# Patient Record
Sex: FEMALE | Race: WHITE | NOT HISPANIC OR LATINO | Employment: PART TIME | URBAN - METROPOLITAN AREA
[De-identification: names, ages, dates, MRNs, and addresses within clinical notes are randomized per-mention and may not be internally consistent; named-entity substitution may affect disease eponyms.]

---

## 2019-04-29 ENCOUNTER — OFFICE VISIT (OUTPATIENT)
Dept: GASTROENTEROLOGY | Facility: CLINIC | Age: 69
End: 2019-04-29
Payer: MEDICARE

## 2019-04-29 VITALS
HEART RATE: 84 BPM | SYSTOLIC BLOOD PRESSURE: 122 MMHG | WEIGHT: 189 LBS | HEIGHT: 66 IN | DIASTOLIC BLOOD PRESSURE: 68 MMHG | BODY MASS INDEX: 30.37 KG/M2

## 2019-04-29 DIAGNOSIS — Z12.11 SCREENING FOR COLON CANCER: ICD-10-CM

## 2019-04-29 DIAGNOSIS — Z86.010 HX OF COLONIC POLYPS: Primary | ICD-10-CM

## 2019-04-29 DIAGNOSIS — I21.09 MYOCARDIAL INFARCTION INVOLVING OTHER CORONARY ARTERY OF ANTERIOR WALL, UNSPECIFIED MI TYPE (HCC): Primary | ICD-10-CM

## 2019-04-29 PROBLEM — E78.5 HYPERLIPIDEMIA: Status: ACTIVE | Noted: 2019-04-29

## 2019-04-29 PROBLEM — F31.9 BIPOLAR DISORDER (HCC): Status: ACTIVE | Noted: 2019-04-29

## 2019-04-29 PROBLEM — I10 HYPERTENSION: Status: ACTIVE | Noted: 2019-04-29

## 2019-04-29 PROBLEM — I21.9 MI (MYOCARDIAL INFARCTION) (HCC): Status: ACTIVE | Noted: 2019-04-29

## 2019-04-29 PROBLEM — F32.A DEPRESSION: Status: ACTIVE | Noted: 2019-04-29

## 2019-04-29 PROBLEM — F41.9 ANXIETY: Status: ACTIVE | Noted: 2019-04-29

## 2019-04-29 PROBLEM — F10.21 HISTORY OF ALCOHOLISM (HCC): Status: ACTIVE | Noted: 2019-04-29

## 2019-04-29 PROCEDURE — 99204 OFFICE O/P NEW MOD 45 MIN: CPT | Performed by: NURSE PRACTITIONER

## 2019-04-29 RX ORDER — FLUOXETINE 10 MG/1
30 CAPSULE ORAL DAILY
Refills: 4 | COMMUNITY
Start: 2019-03-31

## 2019-04-29 RX ORDER — ASPIRIN 81 MG/1
81 TABLET ORAL DAILY
COMMUNITY

## 2019-04-29 RX ORDER — LAMOTRIGINE 200 MG/1
200 TABLET ORAL
Refills: 4 | COMMUNITY
Start: 2019-04-16

## 2019-04-29 RX ORDER — BUSPIRONE HYDROCHLORIDE 10 MG/1
TABLET ORAL
Refills: 4 | COMMUNITY
Start: 2019-04-17

## 2019-04-29 RX ORDER — LISINOPRIL 5 MG/1
5 TABLET ORAL DAILY
Refills: 3 | COMMUNITY
Start: 2019-04-05

## 2019-04-29 RX ORDER — ATORVASTATIN CALCIUM 80 MG/1
80 TABLET, FILM COATED ORAL DAILY
COMMUNITY

## 2019-05-14 PROCEDURE — 88305 TISSUE EXAM BY PATHOLOGIST: CPT | Performed by: PATHOLOGY

## 2019-05-15 ENCOUNTER — LAB REQUISITION (OUTPATIENT)
Dept: LAB | Facility: HOSPITAL | Age: 69
End: 2019-05-15
Payer: MEDICARE

## 2019-05-15 DIAGNOSIS — D12.4 BENIGN NEOPLASM OF DESCENDING COLON: ICD-10-CM

## 2019-05-15 DIAGNOSIS — D12.2 BENIGN NEOPLASM OF ASCENDING COLON: ICD-10-CM

## 2019-05-15 DIAGNOSIS — D12.3 BENIGN NEOPLASM OF TRANSVERSE COLON: ICD-10-CM

## 2019-05-15 DIAGNOSIS — K57.30 DIVERTICULOSIS OF LARGE INTESTINE WITHOUT PERFORATION OR ABSCESS WITHOUT BLEEDING: ICD-10-CM

## 2019-05-15 DIAGNOSIS — Z86.010 HISTORY OF COLONIC POLYPS: ICD-10-CM

## 2019-05-15 DIAGNOSIS — K64.1 SECOND DEGREE HEMORRHOIDS: ICD-10-CM

## 2020-06-12 ENCOUNTER — TRANSCRIBE ORDERS (OUTPATIENT)
Dept: SCHEDULING | Age: 70
End: 2020-06-12

## 2020-06-12 DIAGNOSIS — M75.122 COMPLETE ROTATOR CUFF TEAR OR RUPTURE OF LEFT SHOULDER, NOT SPECIFIED AS TRAUMATIC: ICD-10-CM

## 2020-06-12 DIAGNOSIS — S43.422A SPRAIN OF LEFT ROTATOR CUFF CAPSULE, INITIAL ENCOUNTER: Primary | ICD-10-CM

## 2020-06-12 DIAGNOSIS — M25.512 PAIN IN LEFT SHOULDER: ICD-10-CM

## 2020-06-15 ENCOUNTER — HOSPITAL ENCOUNTER (OUTPATIENT)
Dept: RADIOLOGY | Facility: HOSPITAL | Age: 70
Discharge: HOME | End: 2020-06-15
Attending: ORTHOPAEDIC SURGERY
Payer: MEDICARE

## 2020-06-15 DIAGNOSIS — M75.122 COMPLETE ROTATOR CUFF TEAR OR RUPTURE OF LEFT SHOULDER, NOT SPECIFIED AS TRAUMATIC: ICD-10-CM

## 2020-06-15 DIAGNOSIS — M25.512 PAIN IN LEFT SHOULDER: ICD-10-CM

## 2020-06-15 DIAGNOSIS — S43.422A SPRAIN OF LEFT ROTATOR CUFF CAPSULE, INITIAL ENCOUNTER: ICD-10-CM

## 2020-11-09 ENCOUNTER — APPOINTMENT (EMERGENCY)
Dept: RADIOLOGY | Facility: HOSPITAL | Age: 70
End: 2020-11-09
Payer: MEDICARE

## 2020-11-09 ENCOUNTER — HOSPITAL ENCOUNTER (EMERGENCY)
Facility: HOSPITAL | Age: 70
Discharge: HOME | End: 2020-11-09
Attending: EMERGENCY MEDICINE
Payer: MEDICARE

## 2020-11-09 VITALS
HEIGHT: 66 IN | DIASTOLIC BLOOD PRESSURE: 76 MMHG | RESPIRATION RATE: 18 BRPM | BODY MASS INDEX: 32.14 KG/M2 | OXYGEN SATURATION: 98 % | TEMPERATURE: 97.8 F | SYSTOLIC BLOOD PRESSURE: 172 MMHG | WEIGHT: 200 LBS | HEART RATE: 84 BPM

## 2020-11-09 DIAGNOSIS — S09.90XA HEAD INJURY, INITIAL ENCOUNTER: ICD-10-CM

## 2020-11-09 DIAGNOSIS — S02.2XXA CLOSED FRACTURE OF NASAL BONE, INITIAL ENCOUNTER: ICD-10-CM
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
## 2020-11-09 PROCEDURE — 70486 CT MAXILLOFACIAL W/O DYE: CPT | Mod: ME

## 2020-11-09 PROCEDURE — G1004 CDSM NDSC: HCPCS

## 2020-11-09 PROCEDURE — 72125 CT NECK SPINE W/O DYE: CPT | Mod: ME

## 2020-11-09 PROCEDURE — 73060 X-RAY EXAM OF HUMERUS: CPT | Mod: LT

## 2020-11-09 PROCEDURE — 73030 X-RAY EXAM OF SHOULDER: CPT | Mod: LT

## 2020-11-09 PROCEDURE — 99284 EMERGENCY DEPT VISIT MOD MDM: CPT | Mod: 25

## 2020-11-09 RX ORDER — BUSPIRONE HYDROCHLORIDE 5 MG/1
TABLET ORAL
COMMUNITY
Start: 2020-11-02 | End: 2020-12-01

## 2020-11-09 RX ORDER — ASPIRIN 81 MG/1
81 TABLET ORAL DAILY
COMMUNITY

## 2020-11-09 RX ORDER — LISINOPRIL 5 MG/1
2.5 TABLET ORAL
COMMUNITY
Start: 2020-09-28 | End: 2023-09-28

## 2020-11-09 RX ORDER — LAMOTRIGINE 200 MG/1
200 TABLET ORAL
COMMUNITY
Start: 2020-10-29 | End: 2020-12-01 | Stop reason: SDUPTHER

## 2020-11-09 RX ORDER — ATORVASTATIN CALCIUM 80 MG/1
80 TABLET, FILM COATED ORAL DAILY
COMMUNITY
Start: 2020-09-08

## 2020-11-09 SDOH — HEALTH STABILITY: MENTAL HEALTH: HOW OFTEN DO YOU HAVE A DRINK CONTAINING ALCOHOL?: NEVER

## 2020-11-09 ASSESSMENT — ENCOUNTER SYMPTOMS
SORE THROAT: 0
NECK PAIN: 0
WEAKNESS: 0
SPEECH DIFFICULTY: 0
ACTIVITY CHANGE: 0
ABDOMINAL PAIN: 0
COLOR CHANGE: 0
SHORTNESS OF BREATH: 0
BACK PAIN: 0
SINUS PAIN: 0
FEVER: 0
TROUBLE SWALLOWING: 0
SEIZURES: 0
VOMITING: 0
DIARRHEA: 0
FACIAL SWELLING: 1
HEADACHES: 0
NAUSEA: 0
LIGHT-HEADEDNESS: 0
COUGH: 0
DIAPHORESIS: 0

## 2020-11-09 NOTE — ED ATTESTATION NOTE
I have personally seen and examined the patient.  I reviewed and agree with physician assistant / nurse practitioner’s assessment and plan of care, with the following exceptions: None  My examination, assessment, and plan of care of Keri Hilton is as follows:        70-year-old female fell on the stairs striking her nose.  Patient had no LOC.  Patient has an old rotator cuff injury she is now having increased shoulder pain.  On exam patient is awake alert in no acute distress.  Patient's nose is swollen but not deformed.  She has no lacerations to her face.  CT imaging was obtained and shows nasal bone fractures.  CT of the brain shows no traumatic injury.  CT C-spine shows no fracture.  X-rays of her humerus and shoulder show no fractures.  Patient was discharged home     Bere Lovell MD  11/09/20 2015

## 2020-11-09 NOTE — ED PROVIDER NOTES
HPI     Chief Complaint   Patient presents with   • Fall   • Facial Injury       70-year-old female with past medical history of anxiety, bipolar disorder, depression, hypertension presents after mechanical trip and fall and head injury.  Patient states she tripped and fell and hit her face on stair.  Patient states she had a lot of bleeding from her nose so her neighbors got concerned and sent her to the ER.  Patient denies loss of conscious.  Patient denies being on a blood thinner just takes a baby aspirin daily.  Patient has pain to her nose.  Patient also concerned about her teeth but does not think any are loose.  Also has pain to her left shoulder.  Patient states that she has a chronic rotator cuff injury on that arm and had physical therapy and it back to normal range of motion.  Patient states now it hurts to move and she cannot lift it all the way.  Denies lightheadedness or dizziness prior to the fall or afterwards.  Denies visual changes, chest pain, shortness of breath, abdominal pain, nausea, vomiting, diarrhea, numbness or tingling or focal weakness.           Patient History     Past Medical History:   Diagnosis Date   • Anxiety    • Bipolar disorder (CMS/[facility])    • Depression    • Hypertension        Past Surgical History:   Procedure Laterality Date   •  SECTION     • CORONARY ANGIOPLASTY WITH STENT PLACEMENT         History reviewed. No pertinent family history.    Social History     Tobacco Use   • Smoking status: Current Every Day Smoker     Types: Electronic Cigarette   • Smokeless tobacco: Never Used   Substance Use Topics   • Alcohol use: Never     Frequency: Never   • Drug use: Never       Systems Reviewed from Nursing Triage:          Review of Systems     Review of Systems   Constitutional: Negative for activity change, diaphoresis and fever.   HENT: Positive for facial swelling and nosebleeds. Negative for dental problem, sinus pain, sore throat and trouble swallowing.    Eyes:  "Negative for visual disturbance.   Respiratory: Negative for cough and shortness of breath.    Cardiovascular: Negative for chest pain and leg swelling.   Gastrointestinal: Negative for abdominal pain, diarrhea, nausea and vomiting.   Musculoskeletal: Negative for back pain and neck pain.        Left shoulder pain   Skin: Negative for color change and pallor.   Neurological: Negative for seizures, syncope, speech difficulty, weakness, light-headedness and headaches.   All other systems reviewed and are negative.       Physical Exam     ED Triage Vitals [11/09/20 1840]   Temp Heart Rate Resp BP SpO2   36.6 °C (97.8 °F) 84 18 (!) 176/79 97 %      Temp Source Heart Rate Source Patient Position BP Location FiO2 (%) (Set)   Oral -- -- -- --                     Patient Vitals for the past 24 hrs:   BP Temp Temp src Pulse Resp SpO2 Height Weight   11/09/20 1846 -- -- -- -- -- -- -- 90.7 kg (200 lb)   11/09/20 1840 (!) 176/79 36.6 °C (97.8 °F) Oral 84 18 97 % 1.664 m (5' 5.5\") --                                          Physical Exam  Vitals signs and nursing note reviewed.   Constitutional:       Appearance: She is well-developed.   HENT:      Head: Normocephalic.        Mouth/Throat:      Comments: No loose teeth, maxilla stable  No TTP to jaw or sinuses   Eyes:      Conjunctiva/sclera: Conjunctivae normal.   Neck:      Musculoskeletal: Normal range of motion.   Cardiovascular:      Rate and Rhythm: Normal rate and regular rhythm.   Pulmonary:      Effort: Pulmonary effort is normal.      Breath sounds: Normal breath sounds.   Abdominal:      General: There is no distension.      Palpations: Abdomen is soft. There is no mass.      Tenderness: There is no abdominal tenderness.   Musculoskeletal: Normal range of motion.         General: No tenderness or deformity.      Comments: Left arm: no TTP, pain to lateral shoulder with passive flexion and abduction    Skin:     General: Skin is warm and dry.   Neurological:      " General: No focal deficit present.      Mental Status: She is alert. Mental status is at baseline.   Psychiatric:         Behavior: Behavior normal.              Procedures    Labs Reviewed - No data to display    X-RAY SHOULDER LEFT 2+ VIEWS   ED Interpretation   No obvious fracture or dislocation       Final Result   IMPRESSION: No fracture seen.      COMMENT: Three views of the left shoulder are supplemented by two views of the   left humerus.      We have no prior studies for comparison.      No fracture is identified.      No dislocation is seen about the shoulder or elbow.      The remainder of the humerus appears intact.      There are mild degenerative changes of the acromioclavicular joint.      X-RAY HUMERUS LEFT   ED Interpretation   No obvious fracture or dislocation       Final Result   IMPRESSION: No fracture seen.      COMMENT: Three views of the left shoulder are supplemented by two views of the   left humerus.      We have no prior studies for comparison.      No fracture is identified.      No dislocation is seen about the shoulder or elbow.      The remainder of the humerus appears intact.      There are mild degenerative changes of the acromioclavicular joint.      CT HEAD WITHOUT IV CONTRAST   Final Result   IMPRESSION: No acute intracranial abnormality seen.      COMMENT: 2.5 mm axial scans through the brain were performed without intravenous   contrast material. Images were reconstructed in the sagittal and coronal planes.         CT DOSE:  One or more dose reduction techniques (e.g. automated exposure   control, adjustment of the mA and/or kV according to patient size, use of   iterative reconstruction technique) was utilized for this examination.      Comparison: None.      The ventricles and sulci are prominent in size consistent with atrophy,   consistent with the patient's age.      There are rather extensive areas of low density in the deep white matter, likely   a manifestation of  chronic small vessel ischemic disease in a patient of this   age.      There are no other areas of abnormal high or low density seen throughout the   brain. No intracranial mass, hemorrhage, or midline shift is identified. There   is no evidence to suggest an acute territorial infarction. Please note that MRI   would be more sensitive for an acute ischemic event.      No extra-axial collections are seen.      No osseous abnormality is noted.      There is vascular calcification at the skull base.      CT FACIAL BONES WITHOUT IV CONTRAST   Final Result   IMPRESSION: Bilateral nasal bone fractures.   Possible fracture of the nasal septum.      COMMENT: 2.5 mm helical scans through the facial bones were performed without   intravenous contrast.  Images were reconstructed in the coronal and sagittal   planes..      CT DOSE:  One or more dose reduction techniques (e.g. automated exposure   control, adjustment of the mA and/or kV according to patient size, use of   iterative reconstruction technique) was utilized for this examination.      We have no prior, similar studies for comparison.      There are depressed fractures of the distal nasal bones bilaterally, left   greater than right.  There may also be a somewhat comminuted fracture of the   nasal septum.  There are considerable secretions in the nasal cavity extending   into the nasopharynx which may be hemorrhage from the above-described fractures.      No other fractures are seen.  The pterygoid plates appear intact.      The orbital floors and rims appear intact.      The intraorbital contents appear intact.      There may be minor mucosal thickening of the right maxillary sinus and perhaps a   tiny air-fluid level in the left maxillary sinus.      CT CERVICAL SPINE WITHOUT IV CONTRAST   Final Result   IMPRESSION: No fracture seen.      COMMENT: 1.25 mm helical scans of the cervical spine were performed without   intravenous or intrathecal contrast.  Images were  reconstructed in the coronal   and sagittal planes..      We have no prior studies for comparison.      CT DOSE:  One or more dose reduction techniques (e.g. automated exposure   control, adjustment of the mA and/or kV according to patient size, use of   iterative reconstruction technique) was utilized for this examination..      No fracture or malalignment is seen.      There is mild reversal of the normal lordotic curve which may be due to muscle   spasm or positioning.      There is moderate spondylosis throughout the cervical spine but no severe   central spinal stenosis is seen.      No destructive osseous lesions are seen.      There is a 0.8 cm hypodensity in the right lobe of thyroid gland which, as per   current ACR recommendations, may not require further evaluation.  There is   vascular calcification in the neck.      The lung apices appear clear.      MRI may be of further diagnostic value, as clinically indicated.                  ED Course & MDM     MDM         ED Course as of Nov 09 2018 Mon Nov 09, 2020   1852 Impression: mechanical fall, hit head, nose bleeding  Left shoulder pain    Plan: ct head/neck, xray arm  Discussed pt and plan with attending who agrees    [DB]   1933 Xrays of shoulder unremarkable, probably from previous rotator cuff injury     [DB]   1959 --  IMPRESSION: Bilateral nasal bone fractures.  Possible fracture of the nasal septum.   CT FACIAL BONES WITHOUT IV CONTRAST [DB]   2017 Discussed ice and motrin and follow up with Dr. Casiano.     Pt is new to area and has a new PCP appointment with Dr. Gloria russell     [DB]      ED Course User Index  [DB] Hiral Villarreal PA C         Clinical Impressions as of Nov 09 2018   Fall, initial encounter   Head injury, initial encounter   Closed fracture of nasal bone, initial encounter        Hiral Villarreal PA C  11/09/20 2018

## 2020-11-10 NOTE — DISCHARGE INSTRUCTIONS
Ice your nose as much as possible  600 mg ibuprofen every 6 hours as needed for pain (take with food)  Follow up with Dr. Casiano for further evaluation   Return to the ED at any time for worsening symptoms.

## 2020-12-01 ENCOUNTER — OFFICE VISIT (OUTPATIENT)
Dept: FAMILY MEDICINE | Facility: CLINIC | Age: 70
End: 2020-12-01
Payer: MEDICARE

## 2020-12-01 VITALS
HEIGHT: 65 IN | HEART RATE: 80 BPM | SYSTOLIC BLOOD PRESSURE: 138 MMHG | WEIGHT: 194.2 LBS | BODY MASS INDEX: 32.36 KG/M2 | RESPIRATION RATE: 16 BRPM | OXYGEN SATURATION: 97 % | TEMPERATURE: 98.1 F | DIASTOLIC BLOOD PRESSURE: 80 MMHG

## 2020-12-01 DIAGNOSIS — I10 ESSENTIAL HYPERTENSION: Primary | ICD-10-CM

## 2020-12-01 DIAGNOSIS — E78.5 HYPERLIPIDEMIA, UNSPECIFIED HYPERLIPIDEMIA TYPE: ICD-10-CM

## 2020-12-01 DIAGNOSIS — Z12.31 BREAST CANCER SCREENING BY MAMMOGRAM: ICD-10-CM

## 2020-12-01 DIAGNOSIS — R26.89 BALANCE DISORDER: ICD-10-CM

## 2020-12-01 DIAGNOSIS — F41.9 ANXIETY: ICD-10-CM

## 2020-12-01 DIAGNOSIS — I21.9 MYOCARDIAL INFARCTION, UNSPECIFIED MI TYPE, UNSPECIFIED ARTERY (CMS/HCC): ICD-10-CM

## 2020-12-01 DIAGNOSIS — G62.9 NEUROPATHY: ICD-10-CM

## 2020-12-01 PROBLEM — Z12.11 SCREENING FOR COLON CANCER: Status: ACTIVE | Noted: 2019-04-29

## 2020-12-01 PROBLEM — F10.21 HISTORY OF ALCOHOLISM (CMS/HCC): Status: ACTIVE | Noted: 2019-04-29

## 2020-12-01 PROBLEM — F32.A DEPRESSION: Status: ACTIVE | Noted: 2019-04-29

## 2020-12-01 PROCEDURE — 99204 OFFICE O/P NEW MOD 45 MIN: CPT | Performed by: FAMILY MEDICINE

## 2020-12-01 RX ORDER — BUSPIRONE HYDROCHLORIDE 10 MG/1
20 TABLET ORAL 3 TIMES DAILY
Qty: 180 TABLET | Refills: 1 | Status: SHIPPED | OUTPATIENT
Start: 2020-12-01 | End: 2020-12-28

## 2020-12-01 RX ORDER — FLUOXETINE 10 MG/1
30 CAPSULE ORAL DAILY
COMMUNITY
End: 2020-12-01 | Stop reason: SDUPTHER

## 2020-12-01 RX ORDER — BUSPIRONE HYDROCHLORIDE 10 MG/1
20 TABLET ORAL 3 TIMES DAILY
COMMUNITY
End: 2020-12-01 | Stop reason: SDUPTHER

## 2020-12-01 RX ORDER — GABAPENTIN 300 MG/1
CAPSULE ORAL
COMMUNITY
Start: 2020-11-27 | End: 2020-12-01

## 2020-12-01 RX ORDER — LAMOTRIGINE 200 MG/1
200 TABLET ORAL
Qty: 30 TABLET | Refills: 1 | Status: SHIPPED | OUTPATIENT
Start: 2020-12-01 | End: 2021-02-23

## 2020-12-01 RX ORDER — GABAPENTIN 600 MG/1
600 TABLET ORAL NIGHTLY
COMMUNITY
End: 2021-07-26 | Stop reason: SDUPTHER

## 2020-12-01 RX ORDER — FLUOXETINE 10 MG/1
30 CAPSULE ORAL DAILY
Qty: 90 CAPSULE | Refills: 1 | Status: SHIPPED | OUTPATIENT
Start: 2020-12-01 | End: 2020-12-28

## 2020-12-01 ASSESSMENT — ENCOUNTER SYMPTOMS
APPETITE CHANGE: 0
POLYDIPSIA: 0
ABDOMINAL DISTENTION: 0
NAUSEA: 0
CONSTIPATION: 0
FEVER: 0
COUGH: 0
NUMBNESS: 0
WEAKNESS: 0
SHORTNESS OF BREATH: 0
DIZZINESS: 0
HEADACHES: 0
SINUS PAIN: 0
CHILLS: 0
TROUBLE SWALLOWING: 0
ABDOMINAL PAIN: 0
SINUS PRESSURE: 0
UNEXPECTED WEIGHT CHANGE: 0
EYE REDNESS: 0
DIFFICULTY URINATING: 0
DYSURIA: 0
FLANK PAIN: 0
CHOKING: 0
DIAPHORESIS: 0
JOINT SWELLING: 0
LIGHT-HEADEDNESS: 0
SORE THROAT: 0
BACK PAIN: 0
PALPITATIONS: 0
CHEST TIGHTNESS: 0
RHINORRHEA: 0
VOMITING: 0
FATIGUE: 0
DIARRHEA: 0
FREQUENCY: 0
HEMATURIA: 0
WHEEZING: 0

## 2020-12-01 NOTE — PROGRESS NOTES
Subjective      Patient ID: Keri Hilton is a 70 y.o. female.  1950      HPI    Here today to establish care.  Fell on the stairs recently and broke her nose. Did not follow-up. Pain has improved, no trouble breathing. The bruising and swelling have improved.  Has a balance problem and saw a neurologist. Diagnosed with neuropathy and prescribed gabapentin. Feels unsteady on her feet especially with stairs.   Follows with cardio regularly--just had a stress test. Just had lab work done.  Follows with a psychiatrist--needs to find a new one around here. Stable on her medications.  Had colonoscopy about 2-3 years ago.    The following have been reviewed and updated as appropriate in this visit:  Tobacco  Allergies  Meds  Problems  Surg Hx  Fam Hx  Soc Hx      Review of Systems   Constitutional: Negative for appetite change, chills, diaphoresis, fatigue, fever and unexpected weight change.   HENT: Negative for congestion, ear discharge, ear pain, nosebleeds, postnasal drip, rhinorrhea, sinus pressure, sinus pain, sneezing, sore throat and trouble swallowing.    Eyes: Negative for redness and visual disturbance.   Respiratory: Negative for cough, choking, chest tightness, shortness of breath and wheezing.    Cardiovascular: Negative for chest pain, palpitations and leg swelling.   Gastrointestinal: Negative for abdominal distention, abdominal pain, constipation, diarrhea, nausea and vomiting.   Endocrine: Negative for polydipsia.   Genitourinary: Negative for difficulty urinating, dysuria, flank pain, frequency, genital sores, hematuria and urgency.   Musculoskeletal: Negative for back pain and joint swelling.   Skin: Negative for rash.   Neurological: Negative for dizziness, weakness, light-headedness, numbness and headaches.       Objective     Vitals:    12/01/20 1031   BP: 138/80   BP Location: Right upper arm   Patient Position: Sitting   Pulse: 80   Resp: 16   Temp: 36.7 °C (98.1 °F)   TempSrc:  "Temporal   SpO2: 97%   Weight: 88.1 kg (194 lb 3.2 oz)   Height: 1.645 m (5' 4.75\")     Body mass index is 32.57 kg/m².    Physical Exam  Vitals signs and nursing note reviewed.   Constitutional:       General: She is not in acute distress.     Appearance: Normal appearance. She is well-developed. She is obese. She is not ill-appearing or toxic-appearing.   HENT:      Head: Normocephalic and atraumatic.   Eyes:      General:         Right eye: No discharge.         Left eye: No discharge.      Extraocular Movements: Extraocular movements intact.      Conjunctiva/sclera: Conjunctivae normal.      Pupils: Pupils are equal, round, and reactive to light.   Cardiovascular:      Rate and Rhythm: Normal rate and regular rhythm.      Heart sounds: Normal heart sounds. No murmur. No friction rub.   Pulmonary:      Effort: Pulmonary effort is normal. No respiratory distress.      Breath sounds: Normal breath sounds. No stridor. No wheezing or rhonchi.   Musculoskeletal: Normal range of motion.         General: No swelling.   Neurological:      General: No focal deficit present.      Mental Status: She is alert and oriented to person, place, and time.         Assessment/Plan   Diagnoses and all orders for this visit:    Essential hypertension (Primary)  Assessment & Plan:  Stable, continue medication.      Myocardial infarction, unspecified MI type, unspecified artery (CMS/HCC)  Assessment & Plan:  Follows with cardio.  Continue medication.      Hyperlipidemia, unspecified hyperlipidemia type  Assessment & Plan:  Had lab work done recently for cardio.  Will get copy sent over.  Continue medication.      Anxiety  Assessment & Plan:  Stable, continue medication.  Will schedule with psychiatry.      Balance disorder  Assessment & Plan:  Will schedule for PT.    Orders:  -     Ambulatory referral to Physical Therapy for [facility]; Future    Neuropathy  Assessment & Plan:  Continue gabapentin.      Breast cancer " screening by mammogram  -     BI SCREENING MAMMOGRAM BILATERAL; Future    Other orders  -     busPIRone (BUSPAR) 10 mg tablet; Take 2 tablets (20 mg total) by mouth 3 (three) times a day.  -     FLUoxetine (PROzac) 10 mg capsule; Take 3 capsules (30 mg total) by mouth daily.  -     lamoTRIgine (LaMICtal) 200 mg tablet; Take 1 tablet (200 mg total) by mouth once daily.

## 2020-12-01 NOTE — PATIENT INSTRUCTIONS
ENT: [phone]  Cardiology:   1. Dr. Robles [phone]  2. [facility] Office: [phone]  Psychiatry: Dr. Machuca: [phone]  [facility]  [phone]

## 2020-12-28 RX ORDER — BUSPIRONE HYDROCHLORIDE 10 MG/1
20 TABLET ORAL 3 TIMES DAILY
Qty: 180 TABLET | Refills: 1 | Status: SHIPPED | OUTPATIENT
Start: 2020-12-28 | End: 2021-02-26

## 2020-12-28 RX ORDER — FLUOXETINE 10 MG/1
30 CAPSULE ORAL DAILY
Qty: 90 CAPSULE | Refills: 1 | Status: SHIPPED | OUTPATIENT
Start: 2020-12-28 | End: 2021-02-26

## 2021-02-26 RX ORDER — BUSPIRONE HYDROCHLORIDE 10 MG/1
20 TABLET ORAL 3 TIMES DAILY
Qty: 180 TABLET | Refills: 1 | Status: SHIPPED | OUTPATIENT
Start: 2021-02-26 | End: 2021-05-03

## 2021-02-26 RX ORDER — FLUOXETINE 10 MG/1
30 CAPSULE ORAL DAILY
Qty: 90 CAPSULE | Refills: 1 | Status: SHIPPED | OUTPATIENT
Start: 2021-02-26 | End: 2021-05-03

## 2021-03-18 DIAGNOSIS — G62.9 NEUROPATHY: Primary | ICD-10-CM

## 2021-03-18 RX ORDER — LAMOTRIGINE 200 MG/1
TABLET ORAL
Qty: 30 TABLET | Refills: 0 | Status: SHIPPED | OUTPATIENT
Start: 2021-03-18 | End: 2023-02-27 | Stop reason: DRUGHIGH

## 2021-03-18 NOTE — TELEPHONE ENCOUNTER
Medicine Refill Request    Last Office Visit: 12/1/2020  Last Telemedicine Visit: Visit date not found    Next Office Visit: Visit date not found  Next Telemedicine Visit: Visit date not found         Current Outpatient Medications:   •  aspirin 81 mg enteric coated tablet, Take 81 mg by mouth daily., Disp: , Rfl:   •  atorvastatin (LIPITOR) 80 mg tablet, Take 80 mg by mouth daily. , Disp: , Rfl:   •  busPIRone (BUSPAR) 10 mg tablet, TAKE 2 TABLETS (20 MG TOTAL) BY MOUTH 3 (THREE) TIMES A DAY., Disp: 180 tablet, Rfl: 1  •  FLUoxetine (PROzac) 10 mg capsule, TAKE 3 CAPSULES (30 MG TOTAL) BY MOUTH DAILY., Disp: 90 capsule, Rfl: 1  •  gabapentin (NEURONTIN) 600 mg tablet, Take 600 mg by mouth nightly., Disp: , Rfl:   •  lamoTRIgine (LaMICtal) 200 mg tablet, TAKE 1 TABLET BY MOUTH EVERY DAY, Disp: 30 tablet, Rfl: 0  •  lisinopriL (PRINIVIL) 5 mg tablet, Take 5 mg by mouth once daily., Disp: , Rfl:       BP Readings from Last 3 Encounters:   12/01/20 138/80   11/09/20 (!) 172/76       Recent Lab results:  No results found for: CHOL, No results found for: HDL, No results found for: LDLCALC, No results found for: TRIG     No results found for: GLUCOSE, No results found for: HGBA1C      No results found for: CREATININE    No results found for: TSH

## 2021-05-03 RX ORDER — FLUOXETINE 10 MG/1
30 CAPSULE ORAL DAILY
Qty: 90 CAPSULE | Refills: 1 | Status: SHIPPED | OUTPATIENT
Start: 2021-05-03 | End: 2021-07-26

## 2021-05-03 RX ORDER — BUSPIRONE HYDROCHLORIDE 10 MG/1
20 TABLET ORAL 3 TIMES DAILY
Qty: 180 TABLET | Refills: 1 | Status: SHIPPED | OUTPATIENT
Start: 2021-05-03 | End: 2023-05-22 | Stop reason: SDUPTHER

## 2021-07-14 ENCOUNTER — TELEPHONE (OUTPATIENT)
Dept: FAMILY MEDICINE | Facility: CLINIC | Age: 71
End: 2021-07-14

## 2021-07-14 NOTE — TELEPHONE ENCOUNTER
2 Requests:    1.  Would Dr Castro kindly provide a paper script for Keri so she may go to her local PT office instead of [facility]?    2.  Would Dr Castro please provide her with a script for annual labs so Keri may have blood work drawn before her MAW?

## 2021-07-15 DIAGNOSIS — R26.89 BALANCE DISORDER: ICD-10-CM

## 2021-07-15 DIAGNOSIS — I10 ESSENTIAL HYPERTENSION: ICD-10-CM

## 2021-07-15 DIAGNOSIS — E78.5 HYPERLIPIDEMIA, UNSPECIFIED HYPERLIPIDEMIA TYPE: Primary | ICD-10-CM

## 2021-07-20 ENCOUNTER — HOSPITAL ENCOUNTER (OUTPATIENT)
Dept: RADIOLOGY | Age: 71
Discharge: HOME | End: 2021-07-20
Attending: FAMILY MEDICINE
Payer: MEDICARE

## 2021-07-20 DIAGNOSIS — Z12.31 BREAST CANCER SCREENING BY MAMMOGRAM: ICD-10-CM

## 2021-07-20 PROCEDURE — 77067 SCR MAMMO BI INCL CAD: CPT

## 2021-07-23 LAB
ALBUMIN SERPL-MCNC: 4.3 G/DL (ref 3.6–5.1)
ALBUMIN/GLOB SERPL: 2 (CALC) (ref 1–2.5)
ALP SERPL-CCNC: 97 U/L (ref 37–153)
ALT SERPL-CCNC: 26 U/L (ref 6–29)
AST SERPL-CCNC: 26 U/L (ref 10–35)
BASOPHILS # BLD AUTO: 32 CELLS/UL (ref 0–200)
BASOPHILS NFR BLD AUTO: 0.6 %
BILIRUB SERPL-MCNC: 0.7 MG/DL (ref 0.2–1.2)
BUN SERPL-MCNC: 13 MG/DL (ref 7–25)
BUN/CREAT SERPL: ABNORMAL (CALC) (ref 6–22)
CALCIUM SERPL-MCNC: 9.3 MG/DL (ref 8.6–10.4)
CHLORIDE SERPL-SCNC: 104 MMOL/L (ref 98–110)
CHOLEST SERPL-MCNC: 133 MG/DL
CHOLEST/HDLC SERPL: 2.5 (CALC)
CO2 SERPL-SCNC: 28 MMOL/L (ref 20–32)
CREAT SERPL-MCNC: 0.74 MG/DL (ref 0.5–0.99)
EOSINOPHIL # BLD AUTO: 270 CELLS/UL (ref 15–500)
EOSINOPHIL NFR BLD AUTO: 5 %
ERYTHROCYTE [DISTWIDTH] IN BLOOD BY AUTOMATED COUNT: 12 % (ref 11–15)
GLOBULIN SER CALC-MCNC: 2.2 G/DL (CALC) (ref 1.9–3.7)
GLUCOSE SERPL-MCNC: 132 MG/DL (ref 65–99)
HBA1C MFR BLD: 5.4 % OF TOTAL HGB
HCT VFR BLD AUTO: 42.6 % (ref 35–45)
HDLC SERPL-MCNC: 53 MG/DL
HGB BLD-MCNC: 13.5 G/DL (ref 11.7–15.5)
LDLC SERPL CALC-MCNC: 60 MG/DL (CALC)
LYMPHOCYTES # BLD AUTO: 2009 CELLS/UL (ref 850–3900)
LYMPHOCYTES NFR BLD AUTO: 37.2 %
MCH RBC QN AUTO: 30.3 PG (ref 27–33)
MCHC RBC AUTO-ENTMCNC: 31.7 G/DL (ref 32–36)
MCV RBC AUTO: 95.5 FL (ref 80–100)
MONOCYTES # BLD AUTO: 583 CELLS/UL (ref 200–950)
MONOCYTES NFR BLD AUTO: 10.8 %
NEUTROPHILS # BLD AUTO: 2506 CELLS/UL (ref 1500–7800)
NEUTROPHILS NFR BLD AUTO: 46.4 %
NONHDLC SERPL-MCNC: 80 MG/DL (CALC)
PLATELET # BLD AUTO: 189 THOUSAND/UL (ref 140–400)
PMV BLD REES-ECKER: 11.3 FL (ref 7.5–12.5)
POTASSIUM SERPL-SCNC: 4 MMOL/L (ref 3.5–5.3)
PROT SERPL-MCNC: 6.5 G/DL (ref 6.1–8.1)
QUEST COMMENT: NORMAL
QUEST CONTACT:: NORMAL
QUEST EGFR AFRICAN AMERICAN: 96 ML/MIN/1.73M2
QUEST EGFR NON-AFR. AMERICAN: 83 ML/MIN/1.73M2
QUEST REPORT ALWAYS MESSAGE DEMOGRAPHICS IN QUESTION: NORMAL
RBC # BLD AUTO: 4.46 MILLION/UL (ref 3.8–5.1)
REF LAB TEST NAME: NORMAL
REF LAB TEST: NORMAL
SODIUM SERPL-SCNC: 140 MMOL/L (ref 135–146)
TRIGL SERPL-MCNC: 121 MG/DL
WBC # BLD AUTO: 5.4 THOUSAND/UL (ref 3.8–10.8)

## 2021-07-26 ENCOUNTER — OFFICE VISIT (OUTPATIENT)
Dept: FAMILY MEDICINE | Facility: CLINIC | Age: 71
End: 2021-07-26
Payer: MEDICARE

## 2021-07-26 VITALS
DIASTOLIC BLOOD PRESSURE: 80 MMHG | OXYGEN SATURATION: 95 % | HEIGHT: 65 IN | HEART RATE: 90 BPM | TEMPERATURE: 97.1 F | WEIGHT: 192.6 LBS | BODY MASS INDEX: 32.09 KG/M2 | SYSTOLIC BLOOD PRESSURE: 136 MMHG | RESPIRATION RATE: 12 BRPM

## 2021-07-26 DIAGNOSIS — I10 ESSENTIAL HYPERTENSION: ICD-10-CM

## 2021-07-26 DIAGNOSIS — F10.21 HISTORY OF ALCOHOLISM (CMS/HCC): ICD-10-CM

## 2021-07-26 DIAGNOSIS — Z00.00 MEDICARE ANNUAL WELLNESS VISIT, SUBSEQUENT: Primary | ICD-10-CM

## 2021-07-26 DIAGNOSIS — R26.89 BALANCE DISORDER: ICD-10-CM

## 2021-07-26 DIAGNOSIS — Z78.0 POST-MENOPAUSAL: ICD-10-CM

## 2021-07-26 DIAGNOSIS — G62.9 NEUROPATHY: ICD-10-CM

## 2021-07-26 PROCEDURE — G0439 PPPS, SUBSEQ VISIT: HCPCS | Performed by: FAMILY MEDICINE

## 2021-07-26 RX ORDER — FLUOXETINE HYDROCHLORIDE 40 MG/1
40 CAPSULE ORAL DAILY
COMMUNITY
End: 2021-10-12

## 2021-07-26 RX ORDER — MAGNESIUM 250 MG
250 TABLET ORAL DAILY
COMMUNITY
End: 2021-10-12

## 2021-07-26 RX ORDER — GABAPENTIN 600 MG/1
600 TABLET ORAL NIGHTLY
Qty: 30 TABLET | Refills: 3 | Status: SHIPPED | OUTPATIENT
Start: 2021-07-26 | End: 2021-09-28 | Stop reason: SDUPTHER

## 2021-07-26 ASSESSMENT — ENCOUNTER SYMPTOMS
COUGH: 0
PALPITATIONS: 0
NAUSEA: 0
CONSTIPATION: 0
CHILLS: 0
SHORTNESS OF BREATH: 0
FATIGUE: 0
VOMITING: 0
FEVER: 0
ABDOMINAL PAIN: 0
DIARRHEA: 0
WHEEZING: 0

## 2021-07-26 NOTE — PROGRESS NOTES
Subjective      Patient ID: Keri Hilton is a 70 y.o. female.  1950      HPI    Here today for MAW.  Has her previous mammogram imaging on disc.   Was unable to schedule for the PT--needs a new script to go to [facility].  Falls out of bed all the time.   Has been taking melatonin.     List of other providers:  Following [facility] for medications.  Dermatology  Cardiology    Overall Health    Over the past 4 weeks, how would you rate your health in general? Very good    During the past four weeks, how much bodily pain have you generally had?Very Mild pain    During the past four weeks, was someone available to help you if you needed and wanted help?Yes as much as she wanted    How often do you have trouble taking medicines the way you have been told to take them? Always takes them as prescribed    Cognitive screening?  3/5    Depression Screening  1. Over the past two weeks, have you felt down, depressed, or hopeless? No  2.   Over the past two weeks, have you felt little interest or pleasure in doing things? Yes    Functional Ability/Safety Screening  1. Was the patient's timed up and go test longer than 30 seconds? No  a. Have you fallen 2 or more times in the past year? Yes     b. Are you afraid of falling? Yes  2. Can you get places out of walking distance without help? For example can you travel alone by bus, taxi, or drive your own car? Yes  3. Can you shop for groceries or clothes without help? Yes  4. Can you prepare your own meals? Yes  5. Can you do your own housework without help? Yes  6. Can you handle your own money without help?Yes  7. Do you need help eating, bathing, dressing, or getting around your home? No  8. Does the patient's home have rugs in the hallway, lack grab bars in the bathroom, lack handrails on the stairs or have poor lighting? No  9. Has the patient noticed any hearing difficulties? No      Screening Exams  1. Mammogram? Done 7/20/21--recommend comparing to previous  "imaging  2. Colonoscopy? UTD per patient  3. DEXA? Ordered  4. Pneumovax? UTD  5. Shingrix? Has not gotten due to cost.    Advance Care Directive?  Yes      The following have been reviewed and updated as appropriate in this visit:  Allergies  Meds  Problems       Review of Systems   Constitutional: Negative for chills, fatigue and fever.   Respiratory: Negative for cough, shortness of breath and wheezing.    Cardiovascular: Negative for chest pain, palpitations and leg swelling.   Gastrointestinal: Negative for abdominal pain, constipation, diarrhea, nausea and vomiting.       Objective     Vitals:    07/26/21 1134 07/26/21 1157   BP: (!) 144/82 136/80   BP Location: Left upper arm Left upper arm   Patient Position: Sitting    Pulse: 90    Resp: 12    Temp: 36.2 °C (97.1 °F)    TempSrc: Temporal    SpO2: 95%    Weight: 87.4 kg (192 lb 9.6 oz)    Height: 1.645 m (5' 4.75\")      Body mass index is 32.3 kg/m².    Physical Exam  Vitals and nursing note reviewed.   Constitutional:       General: She is not in acute distress.     Appearance: Normal appearance. She is well-developed. She is not ill-appearing.   Cardiovascular:      Rate and Rhythm: Normal rate and regular rhythm.      Heart sounds: Normal heart sounds. No murmur heard.   No friction rub.   Pulmonary:      Effort: Pulmonary effort is normal. No respiratory distress.      Breath sounds: Normal breath sounds. No stridor. No wheezing, rhonchi or rales.   Musculoskeletal:         General: Normal range of motion.   Neurological:      Mental Status: She is alert.         Assessment/Plan   Diagnoses and all orders for this visit:    Medicare annual wellness visit, subsequent (Primary)    Balance disorder  Assessment & Plan:  Will schedule for physical therapy.    Orders:  -     Ambulatory referral to Physical Therapy; Future    Neuropathy  Assessment & Plan:  Continue gabapentin.      Post-menopausal  -     DEXA BONE DENSITY; Future    Essential " hypertension  Assessment & Plan:  Continue lisinopril.      History of alcoholism (CMS/HCC)    Other orders  -     gabapentin (NEURONTIN) 600 mg tablet; Take 1 tablet (600 mg total) by mouth nightly.

## 2021-08-16 ENCOUNTER — TRANSCRIBE ORDERS (OUTPATIENT)
Dept: RADIOLOGY | Age: 71
End: 2021-08-16

## 2021-08-16 DIAGNOSIS — R92.8 OTHER ABNORMAL AND INCONCLUSIVE FINDINGS ON DIAGNOSTIC IMAGING OF BREAST: Primary | ICD-10-CM

## 2021-08-24 ENCOUNTER — TELEPHONE (OUTPATIENT)
Dept: FAMILY MEDICINE | Facility: CLINIC | Age: 71
End: 2021-08-24

## 2021-08-24 ENCOUNTER — OFFICE VISIT (OUTPATIENT)
Dept: FAMILY MEDICINE | Facility: CLINIC | Age: 71
End: 2021-08-24
Payer: MEDICARE

## 2021-08-24 VITALS
SYSTOLIC BLOOD PRESSURE: 168 MMHG | TEMPERATURE: 97.1 F | WEIGHT: 191 LBS | HEIGHT: 64 IN | BODY MASS INDEX: 32.61 KG/M2 | OXYGEN SATURATION: 99 % | DIASTOLIC BLOOD PRESSURE: 100 MMHG | HEART RATE: 69 BPM

## 2021-08-24 DIAGNOSIS — M54.50 LUMBAR BACK PAIN: Primary | ICD-10-CM

## 2021-08-24 DIAGNOSIS — I10 ESSENTIAL HYPERTENSION: ICD-10-CM

## 2021-08-24 PROBLEM — Z00.00 MEDICARE ANNUAL WELLNESS VISIT, SUBSEQUENT: Status: RESOLVED | Noted: 2021-07-26 | Resolved: 2021-08-24

## 2021-08-24 PROBLEM — Z12.11 SCREENING FOR COLON CANCER: Status: RESOLVED | Noted: 2019-04-29 | Resolved: 2021-08-24

## 2021-08-24 PROCEDURE — G8753 SYS BP > OR = 140: HCPCS | Performed by: FAMILY MEDICINE

## 2021-08-24 PROCEDURE — G8755 DIAS BP > OR = 90: HCPCS | Performed by: FAMILY MEDICINE

## 2021-08-24 PROCEDURE — 99214 OFFICE O/P EST MOD 30 MIN: CPT | Performed by: FAMILY MEDICINE

## 2021-08-24 RX ORDER — METHYLPREDNISOLONE 4 MG/1
TABLET ORAL
Qty: 21 TABLET | Refills: 0 | Status: SHIPPED | OUTPATIENT
Start: 2021-08-24 | End: 2021-08-31

## 2021-08-24 ASSESSMENT — ENCOUNTER SYMPTOMS
NUMBNESS: 0
DYSURIA: 0
CHILLS: 0
WEAKNESS: 0
BACK PAIN: 1
DIFFICULTY URINATING: 0
FEVER: 0

## 2021-08-24 NOTE — TELEPHONE ENCOUNTER
Pt states doctor gave pt a note for work at today's [employer].  Pt is asking if note can be faxed to her employer at [phone] atten: HR?

## 2021-08-24 NOTE — LETTER
August 24, 2021     Patient: Keri Hilton  YOB: 1950  Date of Visit: 8/24/2021    To Whom it May Concern:    Keri Hilton was seen in my clinic on 8/24/2021. Please excuse Keri for her absence from work on 8/21/2021 until 9/1/2021.    If you have any questions or concerns, please don't hesitate to call.         Sincerely,           Richard Castro MD        CC: No Recipients

## 2021-08-24 NOTE — ASSESSMENT & PLAN NOTE
Will stop the advil and take the medrol dose biju.  Continue the extra dose of the gabapentin.  Can use some ice/heat as needed throughout the day.  Will keep out of work for the week.  Call if not improving or if symptoms are worsening over the next 1-2 weeks.

## 2021-08-24 NOTE — PROGRESS NOTES
"      Subjective      Patient ID: Keri Hilton is a 71 y.o. female.  1950      HPI    On Tuesday was walking down a ramp carrying stuff with both hands and fell. Fell on both knees and then the hands. Then Friday went to [employer] and walked a lot and was still ok. Then Saturday woke up and moved and had a pain and had to call out. Feeling better Sunday. But then since yesterday had trouble getting out of the shower and has been excruciating pain every since. Pain is in the lower back on the right side and goes down the right leg to the knee. Saturday foot felt numb but that has resolved. Pain is constant--was the least pain when sitting in the car. Has been taking advil and using heating pads, took an extra dose of the gabapentin in the morning. Works at [employer] and is unable to go to work right now due to the pain. Has been out since Saturday.    The following have been reviewed and updated as appropriate in this visit:  Allergies  Meds  Problems       Review of Systems   Constitutional: Negative for chills and fever.   Genitourinary: Negative for difficulty urinating and dysuria.   Musculoskeletal: Positive for back pain.   Neurological: Negative for weakness and numbness.       Objective     Vitals:    08/24/21 0942   BP: (!) 168/100   BP Location: Left upper arm   Patient Position: Sitting   Pulse: 69   Temp: 36.2 °C (97.1 °F)   TempSrc: Temporal   SpO2: 99%   Weight: 86.6 kg (191 lb)   Height: 1.626 m (5' 4\")     Body mass index is 32.79 kg/m².    Physical Exam  Vitals and nursing note reviewed.   Constitutional:       General: She is not in acute distress.     Appearance: Normal appearance. She is obese. She is not ill-appearing.   Cardiovascular:      Rate and Rhythm: Normal rate and regular rhythm.      Heart sounds: Normal heart sounds. No murmur heard.     Pulmonary:      Effort: Pulmonary effort is normal. No respiratory distress.      Breath sounds: Normal breath sounds. No stridor. No " wheezing, rhonchi or rales.   Musculoskeletal:         General: No tenderness.   Neurological:      Mental Status: She is alert.      Sensory: No sensory deficit.      Motor: No weakness.         Assessment/Plan   Diagnoses and all orders for this visit:    Lumbar back pain (Primary)  Assessment & Plan:  Will stop the advil and take the medrol dose biju.  Continue the extra dose of the gabapentin.  Can use some ice/heat as needed throughout the day.  Will keep out of work for the week.  Call if not improving or if symptoms are worsening over the next 1-2 weeks.    Orders:  -     X-RAY LUMBAR SPINE COMPLETE 4+ VIEWS; Future    Essential hypertension  Assessment & Plan:  BP elevated today due to the pain. Will continue medication and monitor.      Other orders  -     methylPREDNISolone (MEDROL DOSEPACK) 4 mg tablet; Follow package directions.

## 2021-08-24 NOTE — PATIENT INSTRUCTIONS
Continue with the gabapentin twice a day.  Take the medrol dose biju as directed--do not take extra ibuprofen, motrin, advil aleve with the medrol dose biju. It is ok to take tylenol with it.   Use ice/heat to the area for 15 minutes at a time throughout the day.

## 2021-09-02 ENCOUNTER — TRANSCRIBE ORDERS (OUTPATIENT)
Dept: SCHEDULING | Age: 71
End: 2021-09-02

## 2021-09-02 DIAGNOSIS — M54.16 RADICULOPATHY, LUMBAR REGION: Primary | ICD-10-CM

## 2021-09-07 ENCOUNTER — HOSPITAL ENCOUNTER (OUTPATIENT)
Dept: RADIOLOGY | Age: 71
Discharge: HOME | End: 2021-09-07
Attending: RADIOLOGY
Payer: MEDICARE

## 2021-09-07 ENCOUNTER — HOSPITAL ENCOUNTER (OUTPATIENT)
Dept: RADIOLOGY | Age: 71
Discharge: HOME | End: 2021-09-07
Attending: FAMILY MEDICINE
Payer: MEDICARE

## 2021-09-07 DIAGNOSIS — R92.8 OTHER ABNORMAL AND INCONCLUSIVE FINDINGS ON DIAGNOSTIC IMAGING OF BREAST: ICD-10-CM

## 2021-09-07 DIAGNOSIS — Z78.0 POST-MENOPAUSAL: ICD-10-CM

## 2021-09-07 PROCEDURE — 76642 ULTRASOUND BREAST LIMITED: CPT | Mod: RT

## 2021-09-07 PROCEDURE — 77065 DX MAMMO INCL CAD UNI: CPT | Mod: RT

## 2021-09-07 PROCEDURE — G0279 TOMOSYNTHESIS, MAMMO: HCPCS | Mod: RT

## 2021-09-07 PROCEDURE — 77080 DXA BONE DENSITY AXIAL: CPT

## 2021-09-08 ENCOUNTER — HOSPITAL ENCOUNTER (OUTPATIENT)
Dept: RADIOLOGY | Facility: HOSPITAL | Age: 71
Discharge: HOME | End: 2021-09-08
Attending: PHYSICIAN ASSISTANT
Payer: MEDICARE

## 2021-09-08 DIAGNOSIS — M54.16 RADICULOPATHY, LUMBAR REGION: ICD-10-CM

## 2021-09-28 ENCOUNTER — TELEPHONE (OUTPATIENT)
Dept: FAMILY MEDICINE | Facility: CLINIC | Age: 71
End: 2021-09-28

## 2021-09-28 DIAGNOSIS — G62.9 NEUROPATHY: Primary | ICD-10-CM

## 2021-09-28 RX ORDER — GABAPENTIN 600 MG/1
600 TABLET ORAL NIGHTLY
Qty: 30 TABLET | Refills: 3 | Status: SHIPPED | OUTPATIENT
Start: 2021-09-28 | End: 2021-10-12

## 2021-09-28 NOTE — TELEPHONE ENCOUNTER
Do you have enough medication for the next 5 days?: yes  Did you request this medication through your pharmacy or our patient portal in the last day or two? no  Name of medication requested: gabapentin (NEURONTIN)   Medication Strength: 600 mg tablet  Mediation Directions: Take 1 tablet twice a day   Quantity Requested (example 30/90): 60  Number of refills requested: 3      System Generated Preferred Pharmacy(s)  are as follows.  If more than one pharmacy displays please identify which one should be used for this call    Has the pharmacy information below been confirmed with the patient?  yes       [id number] [place] - [facility]  [facility]  [place] [address]  Phone: [phone] Fax: [phone]              Additional Comments:    Next Encounter with this provider: 10/12/2021

## 2021-10-12 ENCOUNTER — OFFICE VISIT (OUTPATIENT)
Dept: FAMILY MEDICINE | Facility: CLINIC | Age: 71
End: 2021-10-12
Payer: MEDICARE

## 2021-10-12 VITALS
OXYGEN SATURATION: 97 % | TEMPERATURE: 96.6 F | SYSTOLIC BLOOD PRESSURE: 120 MMHG | RESPIRATION RATE: 16 BRPM | WEIGHT: 196.2 LBS | HEART RATE: 86 BPM | HEIGHT: 64 IN | DIASTOLIC BLOOD PRESSURE: 76 MMHG | BODY MASS INDEX: 33.49 KG/M2

## 2021-10-12 DIAGNOSIS — M54.50 LUMBAR BACK PAIN: ICD-10-CM

## 2021-10-12 DIAGNOSIS — G62.9 NEUROPATHY: ICD-10-CM

## 2021-10-12 DIAGNOSIS — R56.9 SEIZURE-LIKE ACTIVITY (CMS/HCC): Primary | ICD-10-CM

## 2021-10-12 DIAGNOSIS — F41.9 ANXIETY: ICD-10-CM

## 2021-10-12 DIAGNOSIS — I10 PRIMARY HYPERTENSION: ICD-10-CM

## 2021-10-12 PROCEDURE — 90694 VACC AIIV4 NO PRSRV 0.5ML IM: CPT | Performed by: FAMILY MEDICINE

## 2021-10-12 PROCEDURE — 99214 OFFICE O/P EST MOD 30 MIN: CPT | Mod: 25 | Performed by: FAMILY MEDICINE

## 2021-10-12 PROCEDURE — G0008 ADMIN INFLUENZA VIRUS VAC: HCPCS | Performed by: FAMILY MEDICINE

## 2021-10-12 PROCEDURE — G8752 SYS BP LESS 140: HCPCS | Performed by: FAMILY MEDICINE

## 2021-10-12 PROCEDURE — G8754 DIAS BP LESS 90: HCPCS | Performed by: FAMILY MEDICINE

## 2021-10-12 RX ORDER — GABAPENTIN 600 MG/1
600 TABLET ORAL
Qty: 180 TABLET | Refills: 1 | Status: SHIPPED | OUTPATIENT
Start: 2021-10-12 | End: 2022-04-06 | Stop reason: SDUPTHER

## 2021-10-12 RX ORDER — GABAPENTIN 600 MG/1
600 TABLET ORAL
COMMUNITY
Start: 2021-10-12 | End: 2021-10-12 | Stop reason: SDUPTHER

## 2021-10-12 ASSESSMENT — ENCOUNTER SYMPTOMS
DIARRHEA: 0
VOMITING: 0
FEVER: 0
COUGH: 0
BACK PAIN: 1
PALPITATIONS: 0
SEIZURES: 0
CONSTIPATION: 0
NAUSEA: 0
HEADACHES: 0
CHILLS: 0
FATIGUE: 0
ABDOMINAL PAIN: 0
SHORTNESS OF BREATH: 0
WHEEZING: 0
TREMORS: 0

## 2021-10-12 NOTE — PROGRESS NOTES
"      Subjective      Patient ID: Keri Hilton is a 71 y.o. female.  1950      HPI    Here today for a follow-up visit.  Back pain: Saw ortho who put on antiinflammatories and it did eventually improve. Is now taking the gabapentin twice a day and feels that it is helping with the neuropathy.  Started having convulsions in the middle of the treatment for the back--had them about 30 times in a day. Went to the hospital and saw neurologist who thought it was serotonin syndrome. Adjusted medications. Has a follow-up next week with neuro scheduled. Has not had symptoms within 2-3 days of stopping the fluoxetine.  Has not seen a psychiatrist in a while. Mood is currently stable on the buspirone and lamictal.    The following have been reviewed and updated as appropriate in this visit:  Allergies  Meds  Problems       Review of Systems   Constitutional: Negative for chills, fatigue and fever.   Respiratory: Negative for cough, shortness of breath and wheezing.    Cardiovascular: Negative for chest pain, palpitations and leg swelling.   Gastrointestinal: Negative for abdominal pain, constipation, diarrhea, nausea and vomiting.   Musculoskeletal: Positive for back pain.   Neurological: Negative for tremors, seizures and headaches.       Objective     Vitals:    10/12/21 1334   BP: 120/76   BP Location: Left upper arm   Patient Position: Sitting   Pulse: 86   Resp: 16   Temp: (!) 35.9 °C (96.6 °F)   TempSrc: Temporal   SpO2: 97%   Weight: 89 kg (196 lb 3.2 oz)   Height: 1.626 m (5' 4\")     Body mass index is 33.68 kg/m².    Physical Exam  Vitals and nursing note reviewed.   Constitutional:       General: She is not in acute distress.     Appearance: Normal appearance. She is not ill-appearing.   Cardiovascular:      Rate and Rhythm: Normal rate and regular rhythm.      Heart sounds: Normal heart sounds. No murmur heard.      Pulmonary:      Effort: Pulmonary effort is normal. No respiratory distress.      Breath " sounds: Normal breath sounds. No stridor. No wheezing, rhonchi or rales.   Musculoskeletal:      Right lower leg: No edema.      Left lower leg: No edema.   Neurological:      General: No focal deficit present.      Mental Status: She is alert and oriented to person, place, and time.         Assessment/Plan   Diagnoses and all orders for this visit:    Seizure-like activity (CMS/HCC) (Primary)  Assessment & Plan:  Has not had any further episodes since leaving the hospital and stopping the fluoxetine.  Has a follow-up with the neurologist scheduled next week.      Primary hypertension  Assessment & Plan:  BP good today. Continue medication.      Lumbar back pain  Assessment & Plan:  Following with ortho. Continue with the gabapentin twice a day.      Anxiety  Assessment & Plan:  Continue current medications and will schedule with psychiatry.      Neuropathy  -     gabapentin 600 mg tablet; Take 1 tablet (600 mg total) by mouth 2 (two) times a day.    Other orders  -     Influenza vaccine Quad Adjuvanted 65 and Older (FluAd Quad)

## 2021-10-12 NOTE — ASSESSMENT & PLAN NOTE
Has not had any further episodes since leaving the hospital and stopping the fluoxetine.  Has a follow-up with the neurologist scheduled next week.

## 2021-10-12 NOTE — PATIENT INSTRUCTIONS
[facility]: [phone]  Dr. Machuca: [phone]  [facility]: [phone]

## 2022-03-25 DIAGNOSIS — R92.8 ABNORMALITY OF RIGHT BREAST ON SCREENING MAMMOGRAM: Primary | ICD-10-CM

## 2022-04-06 DIAGNOSIS — G62.9 NEUROPATHY: ICD-10-CM

## 2022-04-07 RX ORDER — GABAPENTIN 600 MG/1
600 TABLET ORAL
Qty: 180 TABLET | Refills: 1 | Status: SHIPPED | OUTPATIENT
Start: 2022-04-07 | End: 2022-08-05 | Stop reason: SDUPTHER

## 2022-04-28 ENCOUNTER — HOSPITAL ENCOUNTER (OUTPATIENT)
Dept: RADIOLOGY | Age: 72
Discharge: HOME | End: 2022-04-28
Attending: FAMILY MEDICINE
Payer: COMMERCIAL

## 2022-04-28 DIAGNOSIS — R92.8 ABNORMALITY OF RIGHT BREAST ON SCREENING MAMMOGRAM: ICD-10-CM

## 2022-04-28 PROCEDURE — G0279 TOMOSYNTHESIS, MAMMO: HCPCS | Mod: RT

## 2022-04-28 PROCEDURE — 76642 ULTRASOUND BREAST LIMITED: CPT | Mod: RT

## 2022-04-29 ENCOUNTER — PATIENT OUTREACH (OUTPATIENT)
Dept: RADIOLOGY | Facility: HOSPITAL | Age: 72
End: 2022-04-29
Payer: COMMERCIAL

## 2022-04-29 ENCOUNTER — TRANSCRIBE ORDERS (OUTPATIENT)
Dept: RADIOLOGY | Facility: HOSPITAL | Age: 72
End: 2022-04-29

## 2022-04-29 DIAGNOSIS — R92.8 OTHER ABNORMAL AND INCONCLUSIVE FINDINGS ON DIAGNOSTIC IMAGING OF BREAST: Primary | ICD-10-CM

## 2022-04-29 NOTE — PROGRESS NOTES
Also spoke with patient about consulting Cardiogist, Dr. Garcia regarding stopping aspirin administration 3 days prior to biopsy.  I informed patient we ask for three days prior to biopsy to stop aspirin if permitted by Dr. Garcia and to follow his advise.

## 2022-05-05 ENCOUNTER — OFFICE VISIT (OUTPATIENT)
Dept: SURGERY | Facility: CLINIC | Age: 72
End: 2022-05-05
Payer: COMMERCIAL

## 2022-05-05 VITALS
BODY MASS INDEX: 29.76 KG/M2 | HEART RATE: 66 BPM | OXYGEN SATURATION: 97 % | TEMPERATURE: 97.9 F | SYSTOLIC BLOOD PRESSURE: 110 MMHG | WEIGHT: 178.6 LBS | HEIGHT: 65 IN | DIASTOLIC BLOOD PRESSURE: 70 MMHG

## 2022-05-05 DIAGNOSIS — N63.10 MASS OF RIGHT BREAST, UNSPECIFIED QUADRANT: Primary | ICD-10-CM

## 2022-05-05 PROCEDURE — 3078F DIAST BP <80 MM HG: CPT | Performed by: NURSE PRACTITIONER

## 2022-05-05 PROCEDURE — 99203 OFFICE O/P NEW LOW 30 MIN: CPT | Performed by: NURSE PRACTITIONER

## 2022-05-05 PROCEDURE — 3074F SYST BP LT 130 MM HG: CPT | Performed by: NURSE PRACTITIONER

## 2022-05-05 PROCEDURE — 3008F BODY MASS INDEX DOCD: CPT | Performed by: NURSE PRACTITIONER

## 2022-05-05 NOTE — PROGRESS NOTES
[facility]  [facility]  [address] [place] [address]  Phone: [phone]  Fax: [phone]      Patient ID: Keri Hilton                              : 1950    Visit Date: 2022  Referring Provider: Richard Castro MD   PCP: Richard Castro MD  GYN: No care team member to display    Chief Complaint: Abnormal Breast Imaging (Right Breast)      Keri Hilton is a 71 y.o.  female who presents for consultation today.  She had recent imaging which revealed a 0.7 x 0.4 x 0.6 lobulated structure within a ectatic duct at the 4 o'clock position of her right breast.  Ultrasound-guided core needle biopsy has been recommended.  The patient denies palpability, nipple discharge, or any other complaints at present.          Breast Health:  Age at menarche: 15  Age at first live birth: 27   Age of menopause: 47     Allergies: Shellfish containing products and Shellfish derived    Current Medications: has a current medication list which includes the following prescription(s): aspirin, atorvastatin, buspirone, gabapentin, lamotrigine, and lisinopril.    Past Medical History:  has a past medical history of Anxiety, Bipolar disorder (CMS/HCC), Depression, and Hypertension.    Past Surgical History:  has a past surgical history that includes Coronary angioplasty with stent;  section; and Ankle surgery (Left).    Social History:   Social History     Tobacco Use   • Smoking status: Former Smoker     Types: Electronic Cigarette     Quit date: 2016     Years since quittin.3   • Smokeless tobacco: Never Used   Vaping Use   • Vaping Use: Every day   • Substances: Nicotine, Flavoring   • Devices: Disposable, Pre-filled or refillable cartridge, Refillable tank, Pre-filled pod   Substance Use Topics   • Alcohol use: Never   • Drug use: Never       Family History: family history includes Breast cancer in her cousin; Cervical cancer in her biological mother; Colon cancer  "in her maternal grandmother; Diabetes in her paternal grandmother; Heart disease in her maternal grandfather; Lung cancer in her paternal grandfather; No Known Problems in her biological sister; Prostate cancer in her biological brother and biological father; Stroke in her biological father.        Physical Exam:    Vitals:   Visit Vitals  /70 (BP Location: Left upper arm, Patient Position: Sitting)   Pulse 66   Temp 36.6 °C (97.9 °F) (Temporal)   Ht 1.651 m (5' 5\")   Wt 81 kg (178 lb 9.6 oz)   SpO2 97%   BMI 29.72 kg/m²     Body mass index is 29.72 kg/m².    Physical Exam  Physical Examination performed in the supine and sitting position  BREAST SIZE:large   SYMMETRY yes       SKIN - Skin color, texture, turgor normal. No rashes or lesions Skin is without tethering, dimpling, retraction or increased vascularity  AREOLA - normal    NIPPLES -  normal without retraction and without discharge  LYMPH NODES: infra, supra, cervical, parasternal and axillary nodes normal bilaterally  BREAST TISSUE: Right breast normal without discrete lesions. Left Breast  normal without discrete lesions.       Breast Imaging: I have personally reviewed the reports and images as follows.  Diagnostic mammo and u/s 4/28/22  Breast density category D  Ectatic duct at the 4 o'clock position 1 cm from the nipple within the duct there is an echogenic structure measuring 0.7 x 0.4 x 0.6 cm it appears to be somewhat lobulated.  Impression:  Right breast mass  Recommendation and Plan:   This is a 71-year-old female presenting to the office today for consultation.  We reviewed her imaging and her exam with special attention to what rendered her imaging suspicious.  We did discuss ultrasound-guided core needle biopsy at length.  She has an appointment for biopsy on Monday morning and I will call her with the pathology becomes available to me.    All of the questions were answered.  The patient is in agreement with the treatment plan.      No " follow-ups on file.        5/5/2022   1:32 PM      TJ Painter

## 2022-05-09 ENCOUNTER — HOSPITAL ENCOUNTER (OUTPATIENT)
Dept: RADIOLOGY | Facility: HOSPITAL | Age: 72
Discharge: HOME | End: 2022-05-09
Attending: NURSE PRACTITIONER
Payer: COMMERCIAL

## 2022-05-09 ENCOUNTER — HOSPITAL ENCOUNTER (OUTPATIENT)
Dept: RADIOLOGY | Facility: HOSPITAL | Age: 72
Discharge: HOME | End: 2022-05-09
Attending: SURGERY
Payer: COMMERCIAL

## 2022-05-09 DIAGNOSIS — N63.10 MASS OF RIGHT BREAST, UNSPECIFIED QUADRANT: ICD-10-CM

## 2022-05-09 PROCEDURE — 0HBT3ZX EXCISION OF RIGHT BREAST, PERCUTANEOUS APPROACH, DIAGNOSTIC: ICD-10-PCS | Performed by: RADIOLOGY

## 2022-05-09 PROCEDURE — 77065 DX MAMMO INCL CAD UNI: CPT | Mod: RT

## 2022-05-09 PROCEDURE — 88305 TISSUE EXAM BY PATHOLOGIST: CPT | Performed by: NURSE PRACTITIONER

## 2022-05-09 PROCEDURE — 25000000 HC PHARMACY GENERAL: Performed by: SURGERY

## 2022-05-09 PROCEDURE — 36100360 US GUIDED BREAST BIOPSY RIGHT

## 2022-05-09 PROCEDURE — BH40ZZZ ULTRASONOGRAPHY OF RIGHT BREAST: ICD-10-PCS | Performed by: RADIOLOGY

## 2022-05-09 RX ORDER — LIDOCAINE HYDROCHLORIDE 10 MG/ML
3 INJECTION, SOLUTION EPIDURAL; INFILTRATION; INTRACAUDAL; PERINEURAL ONCE
Status: COMPLETED | OUTPATIENT
Start: 2022-05-09 | End: 2022-05-09

## 2022-05-09 RX ORDER — LIDOCAINE HYDROCHLORIDE 10 MG/ML
20 INJECTION, SOLUTION EPIDURAL; INFILTRATION; INTRACAUDAL; PERINEURAL ONCE
Status: COMPLETED | OUTPATIENT
Start: 2022-05-09 | End: 2022-05-09

## 2022-05-09 RX ADMIN — LIDOCAINE HYDROCHLORIDE 1.5 ML: 10 INJECTION, SOLUTION EPIDURAL; INFILTRATION; INTRACAUDAL; PERINEURAL at 14:59

## 2022-05-09 RX ADMIN — LIDOCAINE HYDROCHLORIDE 9 ML: 10 INJECTION, SOLUTION EPIDURAL; INFILTRATION; INTRACAUDAL; PERINEURAL at 14:59

## 2022-05-10 LAB
CASE RPRT: NORMAL
CLINICAL INFO: NORMAL
PATH REPORT.FINAL DX SPEC: NORMAL
PATH REPORT.GROSS SPEC: NORMAL

## 2022-08-05 ENCOUNTER — OFFICE VISIT (OUTPATIENT)
Dept: FAMILY MEDICINE | Facility: CLINIC | Age: 72
End: 2022-08-05
Payer: COMMERCIAL

## 2022-08-05 VITALS
HEART RATE: 64 BPM | WEIGHT: 164.4 LBS | OXYGEN SATURATION: 99 % | TEMPERATURE: 97.5 F | HEIGHT: 65 IN | DIASTOLIC BLOOD PRESSURE: 80 MMHG | SYSTOLIC BLOOD PRESSURE: 126 MMHG | BODY MASS INDEX: 27.39 KG/M2 | RESPIRATION RATE: 16 BRPM

## 2022-08-05 DIAGNOSIS — F31.32 BIPOLAR DISORDER, CURRENT EPISODE DEPRESSED, MODERATE (CMS/HCC): ICD-10-CM

## 2022-08-05 DIAGNOSIS — F10.21 HISTORY OF ALCOHOLISM (CMS/HCC): ICD-10-CM

## 2022-08-05 DIAGNOSIS — I10 PRIMARY HYPERTENSION: ICD-10-CM

## 2022-08-05 DIAGNOSIS — R56.9 SEIZURE-LIKE ACTIVITY (CMS/HCC): Primary | ICD-10-CM

## 2022-08-05 DIAGNOSIS — G62.9 NEUROPATHY: ICD-10-CM

## 2022-08-05 DIAGNOSIS — E78.5 HYPERLIPIDEMIA, UNSPECIFIED HYPERLIPIDEMIA TYPE: ICD-10-CM

## 2022-08-05 PROBLEM — I25.10 CORONARY ARTERY DISEASE: Status: ACTIVE | Noted: 2022-08-05

## 2022-08-05 PROBLEM — I21.9 MI (MYOCARDIAL INFARCTION) (CMS/HCC): Status: RESOLVED | Noted: 2019-04-29 | Resolved: 2022-08-05

## 2022-08-05 PROBLEM — F32.A DEPRESSION: Status: RESOLVED | Noted: 2019-04-29 | Resolved: 2022-08-05

## 2022-08-05 PROBLEM — F41.9 ANXIETY: Status: RESOLVED | Noted: 2019-04-29 | Resolved: 2022-08-05

## 2022-08-05 PROCEDURE — 3079F DIAST BP 80-89 MM HG: CPT | Performed by: FAMILY MEDICINE

## 2022-08-05 PROCEDURE — 99214 OFFICE O/P EST MOD 30 MIN: CPT | Performed by: FAMILY MEDICINE

## 2022-08-05 PROCEDURE — 3008F BODY MASS INDEX DOCD: CPT | Performed by: FAMILY MEDICINE

## 2022-08-05 PROCEDURE — 3074F SYST BP LT 130 MM HG: CPT | Performed by: FAMILY MEDICINE

## 2022-08-05 RX ORDER — BUPROPION HYDROCHLORIDE 150 MG/1
150 TABLET ORAL
COMMUNITY
Start: 2022-07-17 | End: 2023-02-10

## 2022-08-05 RX ORDER — GABAPENTIN 600 MG/1
600 TABLET ORAL
Qty: 180 TABLET | Refills: 1 | Status: SHIPPED | OUTPATIENT
Start: 2022-08-05 | End: 2023-03-29

## 2022-08-05 ASSESSMENT — ENCOUNTER SYMPTOMS
FEVER: 0
CONSTIPATION: 0
FATIGUE: 0
VOMITING: 0
ARTHRALGIAS: 0
COUGH: 0
SHORTNESS OF BREATH: 0
DYSPHORIC MOOD: 0
DIARRHEA: 0
FREQUENCY: 0
WHEEZING: 0
SEIZURES: 1
NAUSEA: 0
HEADACHES: 0
DIZZINESS: 0
FACIAL ASYMMETRY: 0
NUMBNESS: 0
PALPITATIONS: 0
ABDOMINAL PAIN: 0
DYSURIA: 0
LIGHT-HEADEDNESS: 0
NERVOUS/ANXIOUS: 0
WEAKNESS: 0
CHILLS: 0
SPEECH DIFFICULTY: 0

## 2022-08-05 ASSESSMENT — PATIENT HEALTH QUESTIONNAIRE - PHQ9: SUM OF ALL RESPONSES TO PHQ9 QUESTIONS 1 & 2: 0

## 2022-08-05 NOTE — ASSESSMENT & PLAN NOTE
Stable, doing well with lifestyle modifications.  Monitor at home and call if consistently getting low readings.  Has follow-up with cardio in a couple weeks as well.
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
## 2022-08-19 LAB
ALBUMIN SERPL-MCNC: 4.4 G/DL (ref 3.8–4.8)
ALBUMIN/GLOB SERPL: 2 {RATIO} (ref 1.2–2.2)
ALP SERPL-CCNC: 121 IU/L (ref 44–121)
ALT SERPL-CCNC: 57 IU/L (ref 0–32)
AST SERPL-CCNC: 44 IU/L (ref 0–40)
BASOPHILS # BLD AUTO: 0 X10E3/UL (ref 0–0.2)
BASOPHILS NFR BLD AUTO: 1 %
BILIRUB SERPL-MCNC: 0.5 MG/DL (ref 0–1.2)
BUN SERPL-MCNC: 12 MG/DL (ref 8–27)
BUN/CREAT SERPL: 17 (ref 12–28)
CALCIUM SERPL-MCNC: 9.7 MG/DL (ref 8.7–10.3)
CHLORIDE SERPL-SCNC: 104 MMOL/L (ref 96–106)
CHOLEST SERPL-MCNC: 152 MG/DL (ref 100–199)
CO2 SERPL-SCNC: 21 MMOL/L (ref 20–29)
CREAT SERPL-MCNC: 0.72 MG/DL (ref 0.57–1)
EGFRCR-CYS SERPLBLD CKD-EPI 2021: 90 ML/MIN/1.73
EOSINOPHIL # BLD AUTO: 0.2 X10E3/UL (ref 0–0.4)
EOSINOPHIL NFR BLD AUTO: 4 %
ERYTHROCYTE [DISTWIDTH] IN BLOOD BY AUTOMATED COUNT: 12.7 % (ref 11.7–15.4)
GLOBULIN SER CALC-MCNC: 2.2 G/DL (ref 1.5–4.5)
GLUCOSE SERPL-MCNC: 130 MG/DL (ref 65–99)
HCT VFR BLD AUTO: 41 % (ref 34–46.6)
HDLC SERPL-MCNC: 49 MG/DL
HGB BLD-MCNC: 13.5 G/DL (ref 11.1–15.9)
IMM GRANULOCYTES # BLD AUTO: 0 X10E3/UL (ref 0–0.1)
IMM GRANULOCYTES NFR BLD AUTO: 0 %
LDLC SERPL CALC-MCNC: 72 MG/DL (ref 0–99)
LYMPHOCYTES # BLD AUTO: 2 X10E3/UL (ref 0.7–3.1)
LYMPHOCYTES NFR BLD AUTO: 35 %
MCH RBC QN AUTO: 30.3 PG (ref 26.6–33)
MCHC RBC AUTO-ENTMCNC: 32.9 G/DL (ref 31.5–35.7)
MCV RBC AUTO: 92 FL (ref 79–97)
MONOCYTES # BLD AUTO: 0.6 X10E3/UL (ref 0.1–0.9)
MONOCYTES NFR BLD AUTO: 10 %
NEUTROPHILS # BLD AUTO: 2.7 X10E3/UL (ref 1.4–7)
NEUTROPHILS NFR BLD AUTO: 50 %
PLATELET # BLD AUTO: 185 X10E3/UL (ref 150–450)
POTASSIUM SERPL-SCNC: 4.6 MMOL/L (ref 3.5–5.2)
PROT SERPL-MCNC: 6.6 G/DL (ref 6–8.5)
RBC # BLD AUTO: 4.45 X10E6/UL (ref 3.77–5.28)
SODIUM SERPL-SCNC: 144 MMOL/L (ref 134–144)
TRIGL SERPL-MCNC: 182 MG/DL (ref 0–149)
TSH SERPL DL<=0.005 MIU/L-ACNC: 2.05 UIU/ML (ref 0.45–4.5)
VLDLC SERPL CALC-MCNC: 31 MG/DL (ref 5–40)
WBC # BLD AUTO: 5.6 X10E3/UL (ref 3.4–10.8)

## 2022-08-20 LAB — HBA1C MFR BLD: 5.6 % (ref 4.8–5.6)

## 2022-08-22 ENCOUNTER — OFFICE VISIT (OUTPATIENT)
Dept: FAMILY MEDICINE | Facility: CLINIC | Age: 72
End: 2022-08-22
Payer: COMMERCIAL

## 2022-08-22 VITALS
TEMPERATURE: 97.1 F | SYSTOLIC BLOOD PRESSURE: 112 MMHG | HEIGHT: 65 IN | BODY MASS INDEX: 27.63 KG/M2 | DIASTOLIC BLOOD PRESSURE: 68 MMHG | RESPIRATION RATE: 16 BRPM | OXYGEN SATURATION: 96 % | HEART RATE: 63 BPM | WEIGHT: 165.8 LBS

## 2022-08-22 DIAGNOSIS — E78.5 HYPERLIPIDEMIA, UNSPECIFIED HYPERLIPIDEMIA TYPE: ICD-10-CM

## 2022-08-22 DIAGNOSIS — I10 PRIMARY HYPERTENSION: ICD-10-CM

## 2022-08-22 DIAGNOSIS — Z00.00 MEDICARE ANNUAL WELLNESS VISIT, SUBSEQUENT: Primary | ICD-10-CM

## 2022-08-22 DIAGNOSIS — R74.8 ELEVATED LIVER ENZYMES: ICD-10-CM

## 2022-08-22 DIAGNOSIS — R56.9 SEIZURE-LIKE ACTIVITY (CMS/HCC): ICD-10-CM

## 2022-08-22 PROBLEM — F31.32: Status: RESOLVED | Noted: 2022-08-05 | Resolved: 2022-08-22

## 2022-08-22 PROBLEM — F10.21 HISTORY OF ALCOHOLISM (CMS/HCC): Status: RESOLVED | Noted: 2019-04-29 | Resolved: 2022-08-22

## 2022-08-22 PROCEDURE — G0439 PPPS, SUBSEQ VISIT: HCPCS | Performed by: FAMILY MEDICINE

## 2022-08-22 ASSESSMENT — ENCOUNTER SYMPTOMS
CHILLS: 0
FEVER: 0
PALPITATIONS: 0
SHORTNESS OF BREATH: 0
HEADACHES: 0
WHEEZING: 0
COUGH: 0
FREQUENCY: 0
BACK PAIN: 0
DYSURIA: 0
NUMBNESS: 0
WEAKNESS: 0
DIZZINESS: 0

## 2022-08-22 NOTE — ASSESSMENT & PLAN NOTE
Recent labs show elevated triglycerides, otherwise at goal. Will continue medication and repeat labs in 6 months.

## 2022-08-22 NOTE — PATIENT INSTRUCTIONS
Please repeat the lab work in 4-6 months.  Continue your medications.  Look into the shingles vaccines.

## 2022-08-22 NOTE — PROGRESS NOTES
Subjective      Patient ID: Keri Hilton is a 72 y.o. female.  1950      HPI      Here today for a Medicare Wellness.  Has been working on a diet but has reached a plateau and has taken a little time off.   Has a follow-up with cardio scheduled.  Has an appointment with Dr. Brice scheduled next month. Has not had any further episodes.      Overall Health  Over the past 4 weeks, how would you rate your health in general? Good    During the past four weeks, how much bodily pain have you generally had? No pain    During the past four weeks, was someone available to help you if you needed and wanted help? Yes as much as she wanted    How often do you have trouble taking medicines the way you have been told to take them? Always takes as prescribed    Cognitive screening?  5/5    Depression Screening  1. Over the past two weeks, have you felt down, depressed, or hopeless? No  2.   Over the past two weeks, have you felt little interest or pleasure in doing things? No    Functional Ability/Safety Screening  1. Was the patient's timed up and go test longer than 30 seconds? No  a. Have you fallen 2 or more times in the past year? No  b. Are you afraid of falling? No  2. Can you get places out of walking distance without help? For example can you travel alone by bus, taxi, or drive your own car? Yes  3. Can you shop for groceries or clothes without help? Yes  4. Can you prepare your own meals? Yes  5. Can you do your own housework without help? Yes  6. Can you handle your own money without help?Yes  7. Do you need help eating, bathing, dressing, or getting around your home? No  8. Does the patient's home have rugs in the hallway, lack grab bars in the bathroom, lack handrails on the stairs or have poor lighting? No  9. Has the patient noticed any hearing difficulties? No      Screening Exams  1. Mammogram? UTD  2. Colonoscopy? [facility]  3. DEXA? UTD  4. Pneumovax? UTD  5. Shingrix? Will look into  "getting.    Advance Care Directive?  Yes      The following have been reviewed and updated as appropriate in this visit:   Tobacco  Allergies  Meds  Problems  Med Hx  Surg Hx  Fam Hx  Soc   Hx        Review of Systems   Constitutional: Negative for chills and fever.   Eyes: Negative for visual disturbance.   Respiratory: Negative for cough, shortness of breath and wheezing.    Cardiovascular: Negative for chest pain, palpitations and leg swelling.   Genitourinary: Negative for dysuria, frequency and urgency.   Musculoskeletal: Negative for back pain.   Skin: Negative for rash.   Neurological: Negative for dizziness, weakness, numbness and headaches.       Objective     Vitals:    08/22/22 1346 08/22/22 1415   BP: 104/64 112/68   BP Location: Left upper arm Left upper arm   Pulse: 63    Resp: 16    Temp: 36.2 °C (97.1 °F)    TempSrc: Temporal    SpO2: 96%    Weight: 75.2 kg (165 lb 12.8 oz)    Height: 1.651 m (5' 5\")      Body mass index is 27.59 kg/m².    Physical Exam  Vitals and nursing note reviewed.   Constitutional:       General: She is not in acute distress.     Appearance: Normal appearance. She is well-developed. She is not ill-appearing.   Cardiovascular:      Rate and Rhythm: Normal rate and regular rhythm.      Heart sounds: Normal heart sounds. No murmur heard.    No friction rub.   Pulmonary:      Effort: Pulmonary effort is normal. No respiratory distress.      Breath sounds: Normal breath sounds. No stridor. No wheezing or rhonchi.   Musculoskeletal:         General: Normal range of motion.   Neurological:      Mental Status: She is alert.         Assessment/Plan   Diagnoses and all orders for this visit:    Medicare annual wellness visit, subsequent (Primary)    Hyperlipidemia, unspecified hyperlipidemia type  Assessment & Plan:  Recent labs show elevated triglycerides, otherwise at goal. Will continue medication and repeat labs in 6 months.    Orders:  -     Lipid panel; Future  -     " Comprehensive metabolic panel; Future    Primary hypertension  Assessment & Plan:  Stable on medication.      Seizure-like activity (CMS/HCC)  Assessment & Plan:  Has an appointment with neurology next month.      Elevated liver enzymes  Assessment & Plan:  Mildly elevated on recent labs--will recheck in a few months.

## 2022-09-10 LAB — SPECIMEN STATUS: NORMAL

## 2022-12-26 PROBLEM — F17.200 TOBACCO USE DISORDER: Status: ACTIVE | Noted: 2022-12-26

## 2022-12-26 PROBLEM — F31.32 BIPOLAR I DISORDER, MOST RECENT EPISODE DEPRESSED, MODERATE (HCC): Status: ACTIVE | Noted: 2022-12-26

## 2022-12-26 RX ORDER — BUPROPION HYDROCHLORIDE 150 MG/1
150 TABLET ORAL DAILY
COMMUNITY
End: 2022-12-29

## 2022-12-26 RX ORDER — BUSPIRONE HYDROCHLORIDE 10 MG/1
10 TABLET ORAL 3 TIMES DAILY
COMMUNITY
End: 2022-12-29 | Stop reason: SDUPTHER

## 2022-12-29 ENCOUNTER — OFFICE VISIT (OUTPATIENT)
Dept: PSYCHIATRY | Facility: CLINIC | Age: 72
End: 2022-12-29

## 2022-12-29 DIAGNOSIS — F31.32 BIPOLAR I DISORDER, MOST RECENT EPISODE DEPRESSED, MODERATE (HCC): Primary | ICD-10-CM

## 2022-12-29 DIAGNOSIS — F17.200 TOBACCO USE DISORDER: ICD-10-CM

## 2022-12-29 DIAGNOSIS — F10.21 HISTORY OF ALCOHOLISM (HCC): ICD-10-CM

## 2022-12-29 RX ORDER — BUSPIRONE HYDROCHLORIDE 10 MG/1
10 TABLET ORAL 3 TIMES DAILY
Qty: 540 TABLET | Refills: 1 | Status: SHIPPED | OUTPATIENT
Start: 2022-12-29

## 2022-12-29 RX ORDER — LAMOTRIGINE 200 MG/1
200 TABLET ORAL
Qty: 180 TABLET | Refills: 1 | Status: SHIPPED | OUTPATIENT
Start: 2022-12-29

## 2022-12-29 NOTE — PATIENT INSTRUCTIONS
Continue Tx  D/C Wellbutrin- was done so by a Neurologist  Increase Lamictal- was done so by Neurologis  Report Problems  Return 3 monthst

## 2022-12-29 NOTE — BH TREATMENT PLAN
TREATMENT PLAN (Medication Management Only)        [facility]    Name and Date of Birth:  Kimmy Muñoz 81 y o  1950  Date of Treatment Plan: December 29, 2022  Diagnosis/Diagnoses:    1  Bipolar I disorder, most recent episode depressed, moderate ([address] )    2  Tobacco use disorder      Strengths/Personal Resources for Self-Care: supportive family, supportive friends, taking medications as prescribed, ability to adapt to life changes, ability to communicate needs, ability to communicate well, ability to listen, ability to reason, ability to understand psychiatric illness  Area/Areas of need (in own words): anxiety, mood swings  1  Long Term Goal: maintain mood stability  Target Date:6 months - 6/29/2023  Person/Persons responsible for completion of goal: Jarrell Gonzales  2  Short Term Objective (s) - How will we reach this goal?:   A  Provider new recommended medication/dosage changes and/or continue medication(s): continue current medications as prescribed Lamictal, Buspar   B  N/A   C  N/A  Target Date:6 months - 6/29/2023  Person/Persons Responsible for Completion of Goal: Jarrell Gonzales  Progress Towards Goals: stable  Treatment Modality: medication management every 3 months  Review due 180 days from date of this plan: 6 months - 6/29/2023  Expected length of service: maintenance  My Physician/PA/NP and I have developed this plan together and I agree to work on the goals and objectives  I understand the treatment goals that were developed for my treatment

## 2022-12-29 NOTE — PSYCH
Virtual Regular Visit    Verification of patient location: at home    Patient is located in the following state in which I hold an active license [place]      Assessment/Plan:       Diagnoses and all orders for this visit:    Bipolar I disorder, most recent episode depressed, moderate (HCC)  -     busPIRone (BUSPAR) 10 mg tablet; Take 1 tablet (10 mg total) by mouth 3 (three) times a day Take 2 PO TID  -     lamoTRIgine (LaMICtal) 200 MG tablet; Take 1 tablet (200 mg total) by mouth daily at bedtime Take 1 PO BID    Tobacco use disorder    History of alcoholism ([facility] [address] )    Other orders  -     Discontinue: busPIRone (BUSPAR) 10 mg tablet; Take 10 mg by mouth 3 (three) times a day Take 2 PO TID  -     Discontinue: buPROPion (WELLBUTRIN XL) 150 mg 24 hr tablet; Take 150 mg by mouth daily Take 1 PO QD          Goals addressed in session:   Good Health  Counseling provided:      Treatment Recommendations- Risks Benefits       Immediate Medical/Psychiatric/Psychotherapy Treatments and Any Precautions:     Risks, Benefits And Possible Side Effects Of Medications:  Risks, benefits, and possible side effects of medications explained to patient and patient verbalizes understanding    Controlled Medication Discussion: The patient has been filling controlled prescriptions on time as prescribed to [facility]      Reason for visit is No chief complaint on file  Medication Management    Encounter provider CHANI Muñoz    Provider located at [address] [place] [address]  [phone]      Recent Visits  No visits were found meeting these conditions    Showing recent visits within past 7 days and meeting all other requirements  Today's Visits  Date Type Provider Dept   12/29/22 Office Visit Joss Muñozdonis today's visits and meeting all other requirements  Future Appointments  No visits were found meeting these conditions  Showing future appointments within next 150 days and meeting all other requirements       The patient was identified by name and date of birth  Kamila Tovar was informed that this is a telemedicine visit and that the visit is being conducted throughthe Onovative platform  She agrees to proceed     My office door was closed  No one else was in the room  She acknowledged consent and understanding of privacy and security of the video platform  The patient has agreed to participate and understands they can discontinue the visit at any time  Patient is aware this is a billable service  Subjective    Kamila Tovar is a 81 y o  female    Here today for a med check  This was via [address] [facility]    normal appetite      HPI  Mood "I am OK"  Neurologist took her off the Wellbutrin and increased the Lamictal  States she is OK  Anxiety manageable with the Buspar  No problems with the medication  Apetite good  Sleep fair  Health OK  Denies SI/HI    Past Medical History:   Diagnosis Date   • Colon polyp        Past Surgical History:   Procedure Laterality Date   • CORONARY ANGIOPLASTY WITH STENT PLACEMENT         Current Outpatient Medications   Medication Sig Dispense Refill   • busPIRone (BUSPAR) 10 mg tablet Take 1 tablet (10 mg total) by mouth 3 (three) times a day Take 2 PO  tablet 1   • lamoTRIgine (LaMICtal) 200 MG tablet Take 1 tablet (200 mg total) by mouth daily at bedtime Take 1 PO  tablet 1   • aspirin (ECOTRIN LOW STRENGTH) 81 mg EC tablet Take 81 mg by mouth daily     • atorvastatin (LIPITOR) 80 mg tablet Take 80 mg by mouth daily     • lisinopril (ZESTRIL) 5 mg tablet Take 5 mg by mouth daily  3   • polyethylene glycol (COLYTE) 4000 mL solution As directed 4000 mL 0     No current facility-administered medications for this visit          No Known Allergies    Social History     Substance and Sexual Activity   Drug Use Not on file Family History   Problem Relation Age of Onset   • Uterine cancer Mother    • Prostate cancer Father    • Colon polyps Sister    • GI problems Brother    • Colon cancer Maternal Grandmother            Objective    Mental status:  Appearance calm and cooperative , adequate hygiene and grooming and good eye contact    Mood mood appropriate   Affect affect appropriate    Speech a normal rate and fluent   Thought Processes normal thought processes   Hallucinations no hallucinations present    Thought Content no delusions   Abnormal Thoughts no suicidal thoughts  and no homicidal thoughts    Orientation  oriented to person and place and time   Remote Memory short term memory intact and long term memory intact   Attention Span concentration intact   Intellect Appears to be of Average Intelligence   Insight Insight intact   Judgement judgment was intact   Muscle Strength Muscle strength and tone were normal and Normal gait    Language no difficulty naming common objects   Fund of Knowledge displays adequate knowledge of current events   Pain none   Pain Scale 0       Video Exam    There were no vitals filed for this visit      I spent 15 minutes directly with the patient during this visit    Patient Instructions   Continue Tx  D/C Wellbutrin- was done so by a Neurologist  Increase Lamictal- was done so by Neurologis  Report Problems  Return 3 monthst       Visit Time    Visit Start Time: 0830  Visit Stop Time: 1314  Total Visit Duration: 23 min

## 2023-01-26 ENCOUNTER — TELEPHONE (OUTPATIENT)
Dept: ADMISSIONS | Facility: HOSPITAL | Age: 73
End: 2023-01-26
Payer: COMMERCIAL

## 2023-01-26 NOTE — TELEPHONE ENCOUNTER
Initial Intake    Keri Hilton, a 72 y.o. female     General  Reason for seeking services (in client's own words)?: Patient neurolgoist, Dr Omar Brice, at  [facility], wants patient to see psychiatrist for medication management since she is having nonepileptic seizure events that may be from medication.    Current Mental Health Symptoms  Current Symptoms: Anxiety; Depression; Other  Other Mental Health History - Please describe: bipolar  How are your symptoms affecting your relationships?: nonepileptic seizure    Mental Health Treatment History  Prior Treatment Reported?: Yes  Type of Treatment: Outpatient    Outpatient  Details: has been getting her medication from Dr Omar Brice at [facility]  Was the treatment voluntary?: Yes  Was treatment completed?: No (still going)    Medical  Extensive History: Other  Other Problems?: high cholesterol  Are you currently experiencing any medical problems that you feel may require emergency care?: No  Are you able to complete ADLs?: yes  Do you require the use of any assistive devices?: No  Medications: lamtical 200mg twice daily, gabpentin 600mg twice daily, atorvastatine 80mg, lisinopril 2.5mg,  buspar 20mg three time daily    Suicide Thoughts/Plans  Have you had suicidal thoughts in the past 72 hours?: No  Have you ever had suicidal thoughts?: Yes  Describe: passive thoughts  Do you have a plan?: No  Have you ever attempted?: Yes ()  Have you ever harmed yourself?: No  Do you have easy access to firearms?: No    Violence/Trauma  Do you currently have, or have you ever had, thoughts of harming someone else?: No  Any history of an event that you would consider emotionally/psychologically/physically traumatic?: no    Employment and Legal  Are you actively employed?: No  Do you now or have you in the past had any legal involvement?: No     Period  Are you seeking treatment at the [facility] related to the  period (before,  during, after pregnancy/adoption)? : No    Pregnancy/Breastfeeding  Are you pregnant?: No  Are you currently breastfeeding or bottle feeding?: No    Substance Use Details  Substance Use Includes: None      Substance Use Treatment History  Are you currently experiencing, or have you ever experienced, withdrawal symptoms?: No  Prior treatment reported?: No    Eating Disorders  Do you have any problematic food related behaviors?: No  Have you ever been preoccupied with your looks or body image?: No    Additional Information  Determination: [facility] for Outpatient  Psychiatry Evaluation  Comments: Riana Greenwood for psychiatry and Evelia San for therapy           Referring Facility Comments: [address] [facility] [place], [address], [place] [address]     Other Referral Source Comments: [phone]

## 2023-01-30 ENCOUNTER — TELEMEDICINE (OUTPATIENT)
Dept: PSYCHIATRY | Facility: HOSPITAL | Age: 73
End: 2023-01-30
Payer: COMMERCIAL

## 2023-01-30 DIAGNOSIS — F41.1 GENERALIZED ANXIETY DISORDER: Primary | ICD-10-CM

## 2023-01-30 PROCEDURE — 90792 PSYCH DIAG EVAL W/MED SRVCS: CPT | Mod: 95
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
## 2023-02-10 ASSESSMENT — ENCOUNTER SYMPTOMS
CONSTITUTIONAL NEGATIVE: 1
RESPIRATORY NEGATIVE: 1
CARDIOVASCULAR NEGATIVE: 1
GASTROINTESTINAL NEGATIVE: 1
NERVOUS/ANXIOUS: 1
MUSCULOSKELETAL NEGATIVE: 1
NEUROLOGICAL NEGATIVE: 1

## 2023-02-17 ENCOUNTER — TELEMEDICINE (OUTPATIENT)
Dept: PSYCHIATRY | Facility: HOSPITAL | Age: 73
End: 2023-02-17
Payer: COMMERCIAL

## 2023-02-17 DIAGNOSIS — F41.1 GENERALIZED ANXIETY DISORDER: Primary | ICD-10-CM

## 2023-02-17 DIAGNOSIS — F44.5 PSYCHOGENIC NONEPILEPTIC SEIZURE: ICD-10-CM

## 2023-02-17 PROCEDURE — 99213 OFFICE O/P EST LOW 20 MIN: CPT | Mod: 95

## 2023-02-17 RX ORDER — HYDROXYZINE HYDROCHLORIDE 25 MG/1
25 TABLET, FILM COATED ORAL 2 TIMES DAILY PRN
Qty: 30 TABLET | Refills: 0 | Status: SHIPPED | OUTPATIENT
Start: 2023-02-17 | End: 2023-02-27 | Stop reason: DRUGHIGH

## 2023-02-17 RX ORDER — ESCITALOPRAM OXALATE 5 MG/1
5 TABLET ORAL DAILY
Qty: 30 TABLET | Refills: 0 | Status: SHIPPED | OUTPATIENT
Start: 2023-02-17 | End: 2023-03-27 | Stop reason: ALTCHOICE
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
## 2023-02-20 ENCOUNTER — TELEPHONE (OUTPATIENT)
Dept: PSYCHIATRY | Facility: HOSPITAL | Age: 73
End: 2023-02-20
Payer: COMMERCIAL

## 2023-02-20 ASSESSMENT — ENCOUNTER SYMPTOMS
CONSTITUTIONAL NEGATIVE: 1
MUSCULOSKELETAL NEGATIVE: 1
NERVOUS/ANXIOUS: 1
CARDIOVASCULAR NEGATIVE: 1
GASTROINTESTINAL NEGATIVE: 1

## 2023-02-20 NOTE — TELEPHONE ENCOUNTER
Keri sent [employer] message with concerns on starting newly prescribed lexapro.  RN called to discuss safety of taking lexapro while prescribed buspar.  Keri expressed concern over literature read on the internet and potential for s/e, also discussed her request to start on welbutrin.  Informed that welbutrin can increase anxiety and provider is not looking to prescribe that medication at this time.  Keri declined to start lexapro at this time and will discuss with provider during visit scheduled for 2/27.  Keri voiced no additional concerns or needs at time of call.    Keri is aware of how to contact office prior to next appointment with any issues, after hours resources etc, patient instructed to call 911 or go to nearest ED if thoughts of self harm, suicide , or violence towards others occurs.

## 2023-02-27 ENCOUNTER — TELEMEDICINE (OUTPATIENT)
Dept: PSYCHIATRY | Facility: HOSPITAL | Age: 73
End: 2023-02-27
Payer: COMMERCIAL

## 2023-02-27 DIAGNOSIS — F44.5 PSYCHOGENIC NONEPILEPTIC SEIZURE: ICD-10-CM

## 2023-02-27 DIAGNOSIS — F41.1 GENERALIZED ANXIETY DISORDER: Primary | ICD-10-CM

## 2023-02-27 PROCEDURE — 99213 OFFICE O/P EST LOW 20 MIN: CPT | Mod: 95

## 2023-02-27 RX ORDER — HYDROXYZINE HYDROCHLORIDE 10 MG/1
10 TABLET, FILM COATED ORAL 2 TIMES DAILY PRN
Qty: 30 TABLET | Refills: 0 | Status: SHIPPED | OUTPATIENT
Start: 2023-02-27 | End: 2023-03-27 | Stop reason: ALTCHOICE

## 2023-02-27 ASSESSMENT — ENCOUNTER SYMPTOMS
NERVOUS/ANXIOUS: 1
MUSCULOSKELETAL NEGATIVE: 1
NEUROLOGICAL NEGATIVE: 1
CONSTITUTIONAL NEGATIVE: 1
GASTROINTESTINAL NEGATIVE: 1
RESPIRATORY NEGATIVE: 1
CARDIOVASCULAR NEGATIVE: 1
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
## 2023-03-06 RX ORDER — LAMOTRIGINE 200 MG/1
200 TABLET ORAL 2 TIMES DAILY
COMMUNITY
Start: 2023-02-17 | End: 2023-03-06 | Stop reason: SDUPTHER

## 2023-03-06 RX ORDER — LAMOTRIGINE 200 MG/1
200 TABLET ORAL 2 TIMES DAILY
Qty: 180 TABLET | Refills: 0 | Status: SHIPPED | OUTPATIENT
Start: 2023-03-06 | End: 2023-05-22 | Stop reason: SDUPTHER

## 2023-03-06 RX ORDER — LAMOTRIGINE 200 MG/1
200 TABLET ORAL 2 TIMES DAILY
Qty: 180 TABLET | Refills: 0 | Status: SHIPPED | OUTPATIENT
Start: 2023-03-06 | End: 2023-03-06 | Stop reason: SDUPTHER

## 2023-03-06 NOTE — TELEPHONE ENCOUNTER
From: Keri Hilton  To: Office of Richard Castro MD  Sent: 3/3/2023 8:48 PM EST  Subject: Medication Renewal Request    Refills have been requested for the following medications:   Other - Sorry for other message. I need prescription for 200 mg lamactil taken 2x per day. I was mistaken I only have refills for 150 mg 2 x day. I was mistaken went I spoke to you on Monday     Preferred pharmacy: [id number] - [place] - [address]   Delivery method: Pickup

## 2023-03-06 NOTE — TELEPHONE ENCOUNTER
Medicine Refill Request      Last Telemedicine Visit: 2/27/2023 Riana Greenwood CRNP    Next Appointment: 3/20/2023 Riana Greenwood CRN      Current Outpatient Medications:   •  aspirin 81 mg enteric coated tablet, Take 81 mg by mouth daily., Disp: , Rfl:   •  atorvastatin (LIPITOR) 80 mg tablet, Take 80 mg by mouth daily. , Disp: , Rfl:   •  busPIRone (BUSPAR) 10 mg tablet, TAKE 2 TABLETS (20 MG TOTAL) BY MOUTH 3 (THREE) TIMES A DAY., Disp: 180 tablet, Rfl: 1  •  escitalopram (LEXAPRO) 5 mg tablet, Take 1 tablet (5 mg total) by mouth daily. Take 1/2 tablet (2.5 mg) for 5-7 days. Then take one full tablet daily., Disp: 30 tablet, Rfl: 0  •  gabapentin (NEURONTIN) 600 mg tablet, Take 1 tablet (600 mg total) by mouth 2 (two) times a day., Disp: 180 tablet, Rfl: 1  •  hydrOXYzine (ATARAX) 10 mg tablet, Take 1 tablet (10 mg total) by mouth 2 (two) times a day as needed for anxiety., Disp: 30 tablet, Rfl: 0  •  lisinopriL (PRINIVIL) 5 mg tablet, Take 5 mg by mouth once daily., Disp: , Rfl:       BP Readings from Last 3 Encounters:   08/22/22 112/68   08/05/22 126/80   05/05/22 110/70       Recent Lab results:  Lab Results   Component Value Date    CHOL 152 08/18/2022   ,   Lab Results   Component Value Date    HDL 49 08/18/2022   ,   Lab Results   Component Value Date    LDLCALC 72 08/18/2022   ,   Lab Results   Component Value Date    TRIG 182 (H) 08/18/2022        Lab Results   Component Value Date    GLUCOSE 130 (H) 08/18/2022   ,   Lab Results   Component Value Date    HGBA1C 5.6 08/18/2022         Lab Results   Component Value Date    CREATININE 0.72 08/18/2022       Lab Results   Component Value Date    TSH 2.050 08/18/2022

## 2023-03-17 ENCOUNTER — OFFICE VISIT (OUTPATIENT)
Dept: BEHAVIORAL HEALTH | Facility: CLINIC | Age: 73
End: 2023-03-17
Payer: COMMERCIAL

## 2023-03-17 DIAGNOSIS — F41.9 ANXIETY: Primary | ICD-10-CM

## 2023-03-17 PROCEDURE — 90791 PSYCH DIAGNOSTIC EVALUATION: CPT

## 2023-03-17 ASSESSMENT — COGNITIVE AND FUNCTIONAL STATUS - GENERAL
MOOD: EUTHYMIC (NORMAL)
APPEARANCE: WELL GROOMED
EYE_CONTACT: WNL
PERCEPTUAL FUNCTION: NORMAL
AROUSAL LEVEL: ALERT
DELUSIONS: NONE OR AGE APPROPRIATE
INSIGHT: INTACT
ATTENTION: WNL
THOUGHT_PROCESS: WNL
SPEECH: REGULAR
ORIENTATION: FULLY ORIENTED
AFFECT: FULL RANGE
PSYCHOMOTOR FUNCTIONING: INCREASED
THOUGHT_CONTENT: APPROPRIATE
CONCENTRATION: WNL
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
## 2023-03-20 ENCOUNTER — TELEMEDICINE (OUTPATIENT)
Dept: PSYCHIATRY | Facility: HOSPITAL | Age: 73
End: 2023-03-20
Payer: COMMERCIAL

## 2023-03-20 DIAGNOSIS — F44.5 PSYCHOGENIC NONEPILEPTIC SEIZURE: ICD-10-CM

## 2023-03-20 DIAGNOSIS — F41.1 GENERALIZED ANXIETY DISORDER: Primary | ICD-10-CM

## 2023-03-20 PROCEDURE — 99214 OFFICE O/P EST MOD 30 MIN: CPT | Mod: 95

## 2023-03-20 ASSESSMENT — ENCOUNTER SYMPTOMS
CARDIOVASCULAR NEGATIVE: 1
GASTROINTESTINAL NEGATIVE: 1
MUSCULOSKELETAL NEGATIVE: 1
NERVOUS/ANXIOUS: 1
RESPIRATORY NEGATIVE: 1
NEUROLOGICAL NEGATIVE: 1
CONSTITUTIONAL NEGATIVE: 1
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
## 2023-03-27 ENCOUNTER — OFFICE VISIT (OUTPATIENT)
Dept: BEHAVIORAL HEALTH | Facility: CLINIC | Age: 73
End: 2023-03-27
Payer: COMMERCIAL

## 2023-03-27 ENCOUNTER — TELEMEDICINE (OUTPATIENT)
Dept: PSYCHIATRY | Facility: HOSPITAL | Age: 73
End: 2023-03-27
Attending: PSYCHIATRY & NEUROLOGY
Payer: COMMERCIAL

## 2023-03-27 DIAGNOSIS — F41.1 GENERALIZED ANXIETY DISORDER: Primary | ICD-10-CM

## 2023-03-27 DIAGNOSIS — F44.5 PSYCHOGENIC NONEPILEPTIC SEIZURE: ICD-10-CM

## 2023-03-27 DIAGNOSIS — F41.9 ANXIETY: Primary | ICD-10-CM

## 2023-03-27 PROCEDURE — 90834 PSYTX W PT 45 MINUTES: CPT

## 2023-03-27 PROCEDURE — 99214 OFFICE O/P EST MOD 30 MIN: CPT | Mod: 95 | Performed by: PSYCHIATRY & NEUROLOGY

## 2023-03-27 NOTE — PROGRESS NOTES
"[facility] - Psychotherapy Follow-up Visit Note  Visit number: 2     Visit Type Performed: In-office     Keri Hilton presented today for a behavioral health visit.      SUBJECTIVE     Symptoms  Depression symptoms: sadness, psychomotor retardation and difficulty concentrating or making decisions  Anxiety symptoms: excessive anxiety/worry, difficulty controlling worry, restless/\"on edge\" and difficulty concentrating/going \"blank\"  Additional reported symptoms: Patient reported that following our intake session, she had 5 more episodes that day. She reported that she continues to live in fear of these episodes occurring.       OBJECTIVE     Mental Status Evaluation  Patient's mood and affect were consistent with the context, and consistent with their baseline: Yes   Comments:  Anxious at times, but calm and pleasant throughout session, awake and alert; oriented to person, place, and time    Additional Assessments completed this visit  PHQ-9: Brief Depression Severity Measure Score: 11  GAD7 Total Score: : 16    Suicidal Ideation/Homicidal Ideation Risk Assessment: not assessed. If not assessed, reason:  Previous evaluation indicated minimal risk. Pt did not endorse any changes in risk or protective factors and none were observed.     Plan for Safety-   N/A:  Risk is assessed to be minimal; therefore, developing a safety plan is not indicated at this time.    Interventions  Empathic Listening and Validation, Insight Development and Psychoeducation  We processed the seizure-like episodes that Patient experienced following last session. Therapist provided psychoeducation on physical manifestations of anxiety and Patient used a \"pressure cooker\" analogy to understand her symptoms further. We discussed loss and transition throughout Patient's past including her divorce, abruptly selling her home in March 2020, and times of instability. We discussed current financial stressors and conflict between her " "son and her .       Psychotropic medications: no known adherence challenges, somewhat effective   Current Outpatient Medications   Medication Sig Dispense Refill   • aspirin 81 mg enteric coated tablet Take 81 mg by mouth daily.     • atorvastatin (LIPITOR) 80 mg tablet Take 80 mg by mouth daily.      • busPIRone (BUSPAR) 10 mg tablet TAKE 2 TABLETS (20 MG TOTAL) BY MOUTH 3 (THREE) TIMES A DAY. 180 tablet 1   • gabapentin (NEURONTIN) 600 mg tablet Take 1 tablet (600 mg total) by mouth 2 (two) times a day. 180 tablet 1   • lamoTRIgine (LaMICtal) 200 mg tablet Take 1 tablet (200 mg total) by mouth 2 (two) times a day. 180 tablet 0   • lisinopriL (PRINIVIL) 5 mg tablet Take 5 mg by mouth once daily.       No current facility-administered medications for this visit.       ASSESSMENT       Progress  Patient's progress toward their goals is generally improving as Patient started to process past experiences that have impacted her stress levels and worsened anxiety symptoms.   Patient's symptomology is unchanged as treatment has just begun. Patient recently experienced 5 episodes of seizure-like activity following her intake session. She continues to report anxiety symptoms including constant worry, restlessness, feeling \"on edge\" or fearful of having another episode and trouble relaxing.     PLAN     Goals:  Development of skills for management of nonepileptic seizures     Recommendations  Individual Therapy  45 minutes weekly    Next visit plan:  Discuss grounding techniques to utilize in moments when Patient recognizes a seizure-like episode might be occurring    I spent 45 minutes on this date of service performing the following activities: providing counseling and education.  "
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
## 2023-03-28 DIAGNOSIS — G62.9 NEUROPATHY: ICD-10-CM

## 2023-03-29 RX ORDER — GABAPENTIN 600 MG/1
TABLET ORAL
Qty: 180 TABLET | Refills: 1 | Status: SHIPPED | OUTPATIENT
Start: 2023-03-29 | End: 2023-10-29 | Stop reason: SDUPTHER

## 2023-03-30 ENCOUNTER — TRANSCRIBE ORDERS (OUTPATIENT)
Dept: SCHEDULING | Age: 73
End: 2023-03-30

## 2023-03-30 DIAGNOSIS — R20.0 ANESTHESIA OF SKIN: ICD-10-CM

## 2023-03-30 DIAGNOSIS — R20.2 PARESTHESIA OF SKIN: ICD-10-CM

## 2023-03-30 DIAGNOSIS — M54.12 RADICULOPATHY, CERVICAL REGION: ICD-10-CM

## 2023-03-30 DIAGNOSIS — M54.2 CERVICALGIA: Primary | ICD-10-CM

## 2023-04-03 ENCOUNTER — OFFICE VISIT (OUTPATIENT)
Dept: BEHAVIORAL HEALTH | Facility: CLINIC | Age: 73
End: 2023-04-03
Payer: COMMERCIAL

## 2023-04-03 DIAGNOSIS — F41.9 ANXIETY: Primary | ICD-10-CM

## 2023-04-03 PROCEDURE — 90834 PSYTX W PT 45 MINUTES: CPT
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
## 2023-04-05 ENCOUNTER — HOSPITAL ENCOUNTER (OUTPATIENT)
Dept: RADIOLOGY | Age: 73
Discharge: HOME | End: 2023-04-05
Attending: NURSE PRACTITIONER
Payer: COMMERCIAL

## 2023-04-05 DIAGNOSIS — M54.2 CERVICALGIA: ICD-10-CM

## 2023-04-05 DIAGNOSIS — R20.0 ANESTHESIA OF SKIN: ICD-10-CM

## 2023-04-05 DIAGNOSIS — M54.12 RADICULOPATHY, CERVICAL REGION: ICD-10-CM

## 2023-04-05 DIAGNOSIS — R20.2 PARESTHESIA OF SKIN: ICD-10-CM

## 2023-04-10 ENCOUNTER — OFFICE VISIT (OUTPATIENT)
Dept: BEHAVIORAL HEALTH | Facility: CLINIC | Age: 73
End: 2023-04-10
Payer: COMMERCIAL

## 2023-04-10 DIAGNOSIS — F41.9 ANXIETY: Primary | ICD-10-CM

## 2023-04-10 PROCEDURE — 90834 PSYTX W PT 45 MINUTES: CPT

## 2023-04-11 ENCOUNTER — OFFICE VISIT (OUTPATIENT)
Dept: FAMILY MEDICINE | Facility: CLINIC | Age: 73
End: 2023-04-11
Payer: COMMERCIAL

## 2023-04-11 VITALS
HEIGHT: 65 IN | SYSTOLIC BLOOD PRESSURE: 114 MMHG | BODY MASS INDEX: 28.22 KG/M2 | WEIGHT: 169.4 LBS | OXYGEN SATURATION: 98 % | TEMPERATURE: 97.4 F | HEART RATE: 70 BPM | DIASTOLIC BLOOD PRESSURE: 72 MMHG | RESPIRATION RATE: 16 BRPM

## 2023-04-11 DIAGNOSIS — I10 PRIMARY HYPERTENSION: ICD-10-CM

## 2023-04-11 DIAGNOSIS — R20.0 BILATERAL HAND NUMBNESS: ICD-10-CM

## 2023-04-11 DIAGNOSIS — R56.9 SEIZURE-LIKE ACTIVITY (CMS/HCC): ICD-10-CM

## 2023-04-11 DIAGNOSIS — E78.5 HYPERLIPIDEMIA, UNSPECIFIED HYPERLIPIDEMIA TYPE: ICD-10-CM

## 2023-04-11 DIAGNOSIS — G62.9 NEUROPATHY: ICD-10-CM

## 2023-04-11 DIAGNOSIS — R60.0 BILATERAL LEG EDEMA: Primary | ICD-10-CM

## 2023-04-11 PROBLEM — Z00.00 MEDICARE ANNUAL WELLNESS VISIT, SUBSEQUENT: Status: RESOLVED | Noted: 2021-07-26 | Resolved: 2023-04-11

## 2023-04-11 PROCEDURE — 3074F SYST BP LT 130 MM HG: CPT | Performed by: FAMILY MEDICINE

## 2023-04-11 PROCEDURE — 3078F DIAST BP <80 MM HG: CPT | Performed by: FAMILY MEDICINE

## 2023-04-11 PROCEDURE — 3008F BODY MASS INDEX DOCD: CPT | Performed by: FAMILY MEDICINE

## 2023-04-11 PROCEDURE — 99214 OFFICE O/P EST MOD 30 MIN: CPT | Performed by: FAMILY MEDICINE

## 2023-04-11 ASSESSMENT — ENCOUNTER SYMPTOMS
COUGH: 0
BACK PAIN: 0
PALPITATIONS: 0
NUMBNESS: 1
WHEEZING: 0
WEAKNESS: 0
HEADACHES: 0
CHILLS: 0
SHORTNESS OF BREATH: 0
FATIGUE: 0
FEVER: 0
NECK PAIN: 0

## 2023-04-11 NOTE — PATIENT INSTRUCTIONS
Please schedule for your fasting lab work.  We will call you with the results.  For the legs--keep them elevated when you are not on them.  Watch your salt intake. You can try some compression stockings as well. Please let me know if your symptoms are worsening.   You can also try cutting back on the gabapentin to 600 mg in the morning and 900 mg in the evening.

## 2023-04-11 NOTE — ASSESSMENT & PLAN NOTE
Had work up from neurology who recommended evaluation from psychiatrist. Currently seeing a therapist and has a follow-up with psychiatry scheduled in a few weeks.

## 2023-04-11 NOTE — PROGRESS NOTES
"      Subjective      Patient ID: Keri Hilton is a 72 y.o. female.  1950      HPI    Last week both feet were swollen--looked like baby feet. Today not as bad just a little bit swollen. No pain. Was taking B12 and stopped taking when this happened. Does not remember any triggers.   Following with ortho for a nerve problem. They increased her gabapentin to 600 in AM and 1200 in PM to help. She is having a lot of numbness in both hands, worse at night but gets it all day. Just had an MRI done and waiting to review the results.  Has an appointment with cardio in 2 weeks.  Saw neurology for the shaking episodes and everything was normal and told to follow-up with psychiatrist. Saw one and tried lexapro which caused side effects so stopped. Saw a different psychiatrist and is now following up with a therapist and then will follow-up with psychiatrist in a few weeks. Still having the episodes of shaking--last one was after saw the therapist for the first time in the car. Lasted about 30 minutes.     The following have been reviewed and updated as appropriate in this visit:   Allergies  Meds  Problems       Review of Systems   Constitutional: Negative for chills, fatigue and fever.   Respiratory: Negative for cough, shortness of breath and wheezing.    Cardiovascular: Positive for leg swelling. Negative for chest pain and palpitations.   Musculoskeletal: Negative for back pain and neck pain.   Neurological: Positive for numbness. Negative for weakness and headaches.       Objective     Vitals:    04/11/23 1058   BP: 114/72   BP Location: Left upper arm   Patient Position: Sitting   Pulse: 70   Resp: 16   Temp: 36.3 °C (97.4 °F)   TempSrc: Temporal   SpO2: 98%   Weight: 76.8 kg (169 lb 6.4 oz)   Height: 1.651 m (5' 5\")     Body mass index is 28.19 kg/m².    Physical Exam  Vitals and nursing note reviewed.   Constitutional:       General: She is not in acute distress.     Appearance: Normal appearance. She is not " ill-appearing.   Cardiovascular:      Rate and Rhythm: Normal rate and regular rhythm.      Heart sounds: Normal heart sounds. No murmur heard.  Pulmonary:      Effort: Pulmonary effort is normal. No respiratory distress.      Breath sounds: Normal breath sounds. No stridor. No wheezing, rhonchi or rales.   Musculoskeletal:      Comments: 1+ pitting edema b/l ankles and feet   Neurological:      Mental Status: She is alert.         Assessment/Plan   Diagnoses and all orders for this visit:    Bilateral leg edema (Primary)  Assessment & Plan:  Will get lab work done.  Decrease the gabapentin down to 600 mg in AM and 900 mg in PM to see if this is contributing.  Also discussed watching salt intake, keeping legs elevated and compression stockings to help as well.  Will call if any changes or concerns.      Bilateral hand numbness  Assessment & Plan:  Following with ortho--had a recent MRI of the cervical spine.       Seizure-like activity (CMS/HCC)  Assessment & Plan:  Had work up from neurology who recommended evaluation from psychiatrist. Currently seeing a therapist and has a follow-up with psychiatry scheduled in a few weeks.      Primary hypertension  Assessment & Plan:  Stable on medication.    Orders:  -     CBC and Differential; Future  -     Comprehensive metabolic panel; Future  -     Lipid panel; Future    Neuropathy    Hyperlipidemia, unspecified hyperlipidemia type  -     CBC and Differential; Future  -     Comprehensive metabolic panel; Future  -     Lipid panel; Future
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
## 2023-04-12 ENCOUNTER — TELEPHONE (OUTPATIENT)
Dept: BEHAVIORAL HEALTH | Facility: CLINIC | Age: 73
End: 2023-04-12
Payer: COMMERCIAL

## 2023-04-12 PROCEDURE — 99999 PR OFFICE/OUTPT VISIT,PROCEDURE ONLY: CPT

## 2023-04-12 NOTE — TELEPHONE ENCOUNTER
Therapist spoke with Patricia Vinson MD with [facility] for coordination of care. We discussed plans for ongoing collaboration throughout treatment with Patient to ensure effective behavioral health services.      Evelia San LCSW

## 2023-04-19 ENCOUNTER — TELEMEDICINE (OUTPATIENT)
Dept: PSYCHIATRY | Facility: HOSPITAL | Age: 73
End: 2023-04-19
Attending: PSYCHIATRY & NEUROLOGY
Payer: COMMERCIAL

## 2023-04-19 DIAGNOSIS — F44.5 PSYCHOGENIC NONEPILEPTIC SEIZURE: ICD-10-CM

## 2023-04-19 DIAGNOSIS — F41.1 GENERALIZED ANXIETY DISORDER: Primary | ICD-10-CM

## 2023-04-19 PROCEDURE — 99213 OFFICE O/P EST LOW 20 MIN: CPT | Mod: 95 | Performed by: PSYCHIATRY & NEUROLOGY

## 2023-04-19 NOTE — Clinical Note
I saw Keri 4/19/23, limited by tech challenges so just did a phone call, no episodes since 3/17/23, and she feels therapy has been very helpful. I did not make any medication changes and will see her next  in person 5/22/23. If you could send an update mid May, that would be helpful. Thanks for doing such good work with her! Patricia
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
## 2023-04-24 ENCOUNTER — OFFICE VISIT (OUTPATIENT)
Dept: BEHAVIORAL HEALTH | Facility: CLINIC | Age: 73
End: 2023-04-24
Payer: COMMERCIAL

## 2023-04-24 DIAGNOSIS — F41.9 ANXIETY: Primary | ICD-10-CM

## 2023-04-24 PROCEDURE — 90834 PSYTX W PT 45 MINUTES: CPT

## 2023-04-25 NOTE — PROGRESS NOTES
"[facility] Services - Psychotherapy Follow-up Visit Note  Visit number: 5    Visit Type Performed: In-office     Keri Hilton presented today for a behavioral health visit.      SUBJECTIVE     Symptoms  Depression symptoms: depressed mood, fatigue/lack of energy and feelings of worthlessness/guilt  Anxiety symptoms: moderate anxiety/worry, difficulty controlling worry, restless/\"on edge\" and muscle tension  Additional reported symptoms: NA    OBJECTIVE     Mental Status Evaluation  Patient's mood and affect were consistent with the context, and consistent with their baseline: Yes   Comments:  Calm and pleasant throughout session, awake and alert; oriented to person, place, and time    Suicidal Ideation/Homicidal Ideation Risk Assessment: not assessed. If not assessed, reason:  Previous evaluation indicated minimal risk. Pt did not endorse any changes in risk or protective factors and none were observed.     Plan for Safety-   N/A:  Risk is assessed to be minimal; therefore, developing a safety plan is not indicated at this time.    Interventions  Cognitive Behavior Therapy, Empathic Listening and Validation and Insight Development  We processed times in Patient's life that have resulted in guilt including her parenting, her role in the divorce, her history of partying, her role as a grandmother, and aspects of the relationships with her immediate family. Therapist started to utilize thought challenging with skills of self-compassion and will continue to assist Patient in challenging these experiences.     Psychotropic medications: no known adherence challenges, somewhat effective   Current Outpatient Medications   Medication Sig Dispense Refill   • aspirin 81 mg enteric coated tablet Take 81 mg by mouth daily.     • atorvastatin (LIPITOR) 80 mg tablet Take 80 mg by mouth daily.      • busPIRone (BUSPAR) 10 mg tablet TAKE 2 TABLETS (20 MG TOTAL) BY MOUTH 3 (THREE) TIMES A DAY. 180 tablet 1   • gabapentin " (NEURONTIN) 600 mg tablet TAKE 1 TABLET BY MOUTH TWICE A  tablet 1   • lamoTRIgine (LaMICtal) 200 mg tablet Take 1 tablet (200 mg total) by mouth 2 (two) times a day. 180 tablet 0   • lisinopriL (PRINIVIL) 5 mg tablet Take 5 mg by mouth once daily.       No current facility-administered medications for this visit.       ASSESSMENT       Progress  Patient's progress toward their goals is generally improving as Patient processed areas in her life where she carries significant guilt.   Patient's symptomology is gradually improving. Although Patient has reported increased tearfulness and emotional dysregulation, this is likely contributed to opening up in therapy and starting to process a history of pain. Patient denied any recent incidents of nonepileptic seizure activity.     PLAN     Goals:  Development of skills for management of nonepileptic seizures     Recommendations  Individual Therapy  45 minutes weekly    Next visit plan:  -Assess readiness for forgiveness of self regarding the guilt she carries  -Further process history of relationship to men and authority figures as this was not addressed today    I spent 45 minutes on this date of service performing the following activities: providing counseling and education.

## 2023-05-01 ENCOUNTER — OFFICE VISIT (OUTPATIENT)
Dept: BEHAVIORAL HEALTH | Facility: CLINIC | Age: 73
End: 2023-05-01
Payer: COMMERCIAL

## 2023-05-01 DIAGNOSIS — F41.9 ANXIETY: Primary | ICD-10-CM

## 2023-05-01 PROCEDURE — 90834 PSYTX W PT 45 MINUTES: CPT

## 2023-05-01 NOTE — PROGRESS NOTES
"[facility] - Psychotherapy Follow-up Visit Note  Visit number: 6    Visit Type Performed: In-office     Keri Hilton presented today for a behavioral health visit.      SUBJECTIVE     Symptoms  Depression symptoms: fatigue/lack of energy and feelings of worthlessness/guilt  Anxiety symptoms: moderate anxiety/worry, difficulty controlling worry, restless/\"on edge\" and muscle tension  Additional reported symptoms: NA    OBJECTIVE     Mental Status Evaluation  Patient's mood and affect were consistent with the context, and consistent with their baseline: Yes   Comments:  Calm and pleasant throughout session, awake and alert; oriented to person, place, and time    Suicidal Ideation/Homicidal Ideation Risk Assessment: not assessed. If not assessed, reason:  Previous evaluation indicated minimal risk. Pt did not endorse any changes in risk or protective factors and none were observed.     Plan for Safety-   N/A:  Risk is assessed to be minimal; therefore, developing a safety plan is not indicated at this time.    Interventions  Cognitive Behavior Therapy, Communication Skills and Empathic Listening and Validation  We further discussed Patient's understanding of how letting go of her past can help with her anxiety symptoms. We processed barriers to meeting her oldest son's expectations. We processed a history of not standing up for herself/voicing her needs or concerns in different types of relationships. We processed enabling behaviors of her  and how this creates conflict in their relationship.     Psychotropic medications: no known adherence challenges, somewhat effective   Current Outpatient Medications   Medication Sig Dispense Refill   • aspirin 81 mg enteric coated tablet Take 81 mg by mouth daily.     • atorvastatin (LIPITOR) 80 mg tablet Take 80 mg by mouth daily.      • busPIRone (BUSPAR) 10 mg tablet TAKE 2 TABLETS (20 MG TOTAL) BY MOUTH 3 (THREE) TIMES A DAY. 180 tablet 1   • " gabapentin (NEURONTIN) 600 mg tablet TAKE 1 TABLET BY MOUTH TWICE A  tablet 1   • lamoTRIgine (LaMICtal) 200 mg tablet Take 1 tablet (200 mg total) by mouth 2 (two) times a day. 180 tablet 0   • lisinopriL (PRINIVIL) 5 mg tablet Take 5 mg by mouth once daily.       No current facility-administered medications for this visit.       ASSESSMENT       Progress  Patient's progress toward their goals is generally improving as Patient processed interactions between herself and the primary men in her life resulting in patterns of censuring herself and therefore resulting in unprocessed experiences and feelings.   Patient's symptomology is gradually improving. Patient continues to deny recent episodes of nonepileptic seizures. Patient's mood appears to be improving, though she noted that neuropathy pain can impact her ability to complete ADLs and engage in other activities.     PLAN     Goals:  Development of skills for management of nonepileptic seizures     Recommendations  Individual Therapy  45 minutes weekly    Next visit plan:  -Assess other areas in Patient's life that have resulted in disappointment or fear    I spent 45 minutes on this date of service performing the following activities: providing counseling and education.

## 2023-05-08 ENCOUNTER — OFFICE VISIT (OUTPATIENT)
Dept: BEHAVIORAL HEALTH | Facility: CLINIC | Age: 73
End: 2023-05-08
Payer: COMMERCIAL

## 2023-05-08 DIAGNOSIS — F41.9 ANXIETY: Primary | ICD-10-CM

## 2023-05-08 LAB
BASOPHILS # BLD AUTO: 0.1 X10E3/UL (ref 0–0.2)
BASOPHILS NFR BLD AUTO: 1 %
EOSINOPHIL # BLD AUTO: 0.3 X10E3/UL (ref 0–0.4)
EOSINOPHIL NFR BLD AUTO: 3 %
ERYTHROCYTE [DISTWIDTH] IN BLOOD BY AUTOMATED COUNT: 13.7 % (ref 11.7–15.4)
HCT VFR BLD AUTO: 41.5 % (ref 34–46.6)
HGB BLD-MCNC: 14.3 G/DL (ref 11.1–15.9)
IMM GRANULOCYTES # BLD AUTO: 0 X10E3/UL (ref 0–0.1)
IMM GRANULOCYTES NFR BLD AUTO: 0 %
LYMPHOCYTES # BLD AUTO: 4.2 X10E3/UL (ref 0.7–3.1)
LYMPHOCYTES NFR BLD AUTO: 42 %
MCH RBC QN AUTO: 30 PG (ref 26.6–33)
MCHC RBC AUTO-ENTMCNC: 34.5 G/DL (ref 31.5–35.7)
MCV RBC AUTO: 87 FL (ref 79–97)
MONOCYTES # BLD AUTO: 1 X10E3/UL (ref 0.1–0.9)
MONOCYTES NFR BLD AUTO: 10 %
NEUTROPHILS # BLD AUTO: 4.5 X10E3/UL (ref 1.4–7)
NEUTROPHILS NFR BLD AUTO: 44 %
PLATELET # BLD AUTO: 245 X10E3/UL (ref 150–450)
RBC # BLD AUTO: 4.77 X10E6/UL (ref 3.77–5.28)
WBC # BLD AUTO: 10.1 X10E3/UL (ref 3.4–10.8)

## 2023-05-08 PROCEDURE — 90837 PSYTX W PT 60 MINUTES: CPT

## 2023-05-09 LAB
ALBUMIN SERPL-MCNC: 4.2 G/DL (ref 3.8–4.8)
ALBUMIN/GLOB SERPL: 2.1 {RATIO} (ref 1.2–2.2)
ALP SERPL-CCNC: 93 IU/L (ref 44–121)
ALT SERPL-CCNC: 28 IU/L (ref 0–32)
AST SERPL-CCNC: 24 IU/L (ref 0–40)
BILIRUB SERPL-MCNC: 0.6 MG/DL (ref 0–1.2)
BUN SERPL-MCNC: 13 MG/DL (ref 8–27)
BUN/CREAT SERPL: 19 (ref 12–28)
CALCIUM SERPL-MCNC: 9.6 MG/DL (ref 8.7–10.3)
CHLORIDE SERPL-SCNC: 104 MMOL/L (ref 96–106)
CHOLEST SERPL-MCNC: 162 MG/DL (ref 100–199)
CO2 SERPL-SCNC: 23 MMOL/L (ref 20–29)
CREAT SERPL-MCNC: 0.7 MG/DL (ref 0.57–1)
EGFRCR SERPLBLD CKD-EPI 2021: 93 ML/MIN/1.73
GLOBULIN SER CALC-MCNC: 2 G/DL (ref 1.5–4.5)
GLUCOSE SERPL-MCNC: 100 MG/DL (ref 70–99)
HDLC SERPL-MCNC: 54 MG/DL
LDLC SERPL CALC-MCNC: 81 MG/DL (ref 0–99)
POTASSIUM SERPL-SCNC: 4.5 MMOL/L (ref 3.5–5.2)
PROT SERPL-MCNC: 6.2 G/DL (ref 6–8.5)
SODIUM SERPL-SCNC: 142 MMOL/L (ref 134–144)
TRIGL SERPL-MCNC: 161 MG/DL (ref 0–149)
VLDLC SERPL CALC-MCNC: 27 MG/DL (ref 5–40)

## 2023-05-09 NOTE — PROGRESS NOTES
"[facility] - Psychotherapy Follow-up Visit Note  Visit number: 7    Visit Type Performed: In-office     Keri Hilton presented today for a behavioral health visit.      SUBJECTIVE     Symptoms  Depression symptoms: fatigue/lack of energy and feelings of worthlessness/guilt  Anxiety symptoms: moderate anxiety/worry, difficulty controlling worry, restless/\"on edge\" and muscle tension  Additional reported symptoms: NA    OBJECTIVE     Mental Status Evaluation  Patient's mood and affect were consistent with the context, and consistent with their baseline: Yes   Comments:  Calm and pleasant, awake and alert; oriented to person, place, and time    Suicidal Ideation/Homicidal Ideation Risk Assessment: not assessed. If not assessed, reason:  Previous evaluation indicated minimal risk. Pt did not endorse any changes in risk or protective factors and none were observed.     Plan for Safety-   N/A:  Risk is assessed to be minimal; therefore, developing a safety plan is not indicated at this time.    Interventions  Cognitive Behavior Therapy and Empathic Listening and Validation  We processed current stress within the family system as Patient's step-granddaughter is in the process of being admitted to a psychiatric hospital. We processed factors that resulted in Patient's  habits of doting on Patient and a history of \"all or nothing\" thinking that impacts Patient's ability to voice her concerns or problem solve certain stressors in their relationship. We additionally briefly processed stressors related to aging in general.    Psychotropic medications: no known adherence challenges, somewhat effective   Current Outpatient Medications   Medication Sig Dispense Refill   • aspirin 81 mg enteric coated tablet Take 81 mg by mouth daily.     • atorvastatin (LIPITOR) 80 mg tablet Take 80 mg by mouth daily.      • busPIRone (BUSPAR) 10 mg tablet TAKE 2 TABLETS (20 MG TOTAL) BY MOUTH 3 (THREE) TIMES A DAY. 180 " tablet 1   • gabapentin (NEURONTIN) 600 mg tablet TAKE 1 TABLET BY MOUTH TWICE A  tablet 1   • lamoTRIgine (LaMICtal) 200 mg tablet Take 1 tablet (200 mg total) by mouth 2 (two) times a day. 180 tablet 0   • lisinopriL (PRINIVIL) 5 mg tablet Take 5 mg by mouth once daily.       No current facility-administered medications for this visit.       ASSESSMENT       Progress  Patient's progress toward their goals is generally improving as Patient processed current stressors as well as a history of tension within the relationship with her .   Patient's symptomology is gradually improving. Patient continues to deny recent episodes of nonepileptic seizures. Patient's mood appears to be improving. However, Patient does continue to hold anxiety inside with little ability to get the energy out.     PLAN     Goals:  Development of skills for management of nonepileptic seizures     Recommendations  Individual Therapy  45 minutes weekly    Next visit plan:  -Assess other areas in Patient's life that have resulted in disappointment or fear  -Discuss ways for Patient to anxious energy    I spent 55 minutes on this date of service performing the following activities: providing counseling and education.

## 2023-05-15 ENCOUNTER — OFFICE VISIT (OUTPATIENT)
Dept: BEHAVIORAL HEALTH | Facility: CLINIC | Age: 73
End: 2023-05-15
Payer: COMMERCIAL

## 2023-05-15 DIAGNOSIS — F41.9 ANXIETY: Primary | ICD-10-CM

## 2023-05-15 PROCEDURE — 90834 PSYTX W PT 45 MINUTES: CPT
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
## 2023-05-22 ENCOUNTER — OFFICE VISIT (OUTPATIENT)
Dept: PSYCHIATRY | Facility: HOSPITAL | Age: 73
End: 2023-05-22
Attending: PSYCHIATRY & NEUROLOGY
Payer: COMMERCIAL

## 2023-05-22 DIAGNOSIS — F44.5 PSYCHOGENIC NONEPILEPTIC SEIZURE: ICD-10-CM

## 2023-05-22 DIAGNOSIS — F41.1 GENERALIZED ANXIETY DISORDER: Primary | ICD-10-CM

## 2023-05-22 PROCEDURE — 99214 OFFICE O/P EST MOD 30 MIN: CPT | Performed by: PSYCHIATRY & NEUROLOGY

## 2023-05-22 RX ORDER — LAMOTRIGINE 200 MG/1
200 TABLET ORAL 2 TIMES DAILY
Qty: 180 TABLET | Refills: 0 | Status: SHIPPED | OUTPATIENT
Start: 2023-05-22 | End: 2023-07-24 | Stop reason: SINTOL

## 2023-05-22 RX ORDER — ARIPIPRAZOLE 2 MG/1
2 TABLET ORAL DAILY
Qty: 30 TABLET | Refills: 1 | Status: SHIPPED | OUTPATIENT
Start: 2023-05-22 | End: 2023-05-24

## 2023-05-22 RX ORDER — BUSPIRONE HYDROCHLORIDE 10 MG/1
20 TABLET ORAL 3 TIMES DAILY
Qty: 540 TABLET | Refills: 0 | Status: SHIPPED | OUTPATIENT
Start: 2023-05-22 | End: 2023-08-23 | Stop reason: SDUPTHER

## 2023-05-22 ASSESSMENT — ABNORMAL INVOLUNTARY MOVEMENT SCALE (AIMS)
TOTAL_SCORE: 0
AIMS_SEVERITY: NONE, NORMAL
CURRENT_PROBLEMS_TEETH_DENTURES: NO
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
## 2023-05-22 NOTE — Clinical Note
Keri Osorio is improving with individual therapy though with persistent emotional dysregulation. I would like to start low dose aripiprazole augmentation, but don't see ay recent ECG in record. Patient working to have one sent from non [facility] cardiologist, but if you happen to have one, please send. I will also take over lamotrigine and buspirone prescribing if that is OK with you. Patricia

## 2023-05-24 ENCOUNTER — TELEPHONE (OUTPATIENT)
Dept: PSYCHIATRY | Facility: HOSPITAL | Age: 73
End: 2023-05-24
Payer: COMMERCIAL

## 2023-05-24 RX ORDER — RISPERIDONE 0.25 MG/1
0.25 TABLET ORAL NIGHTLY
Qty: 30 TABLET | Refills: 0 | Status: SHIPPED | OUTPATIENT
Start: 2023-05-24 | End: 2023-06-19 | Stop reason: SDUPTHER

## 2023-05-24 NOTE — TELEPHONE ENCOUNTER
Patient seen in person for a follow-up visit 5/22/2023  At that time, patient describes struggling with mood regulation.  Trial of aripiprazole discussed, however, cost prohibitive.    Call placed to patient this morning to review treatment options.  Will discontinue aripiprazole and initiate trial of risperidone 0.25 mg p.o. nightly to target emotional dysregulation.  Risks and benefits reviewed in detail.  Will add diagnosis of mood disorder unspecified.    ECG reviewed:  November 2, 2022: Normal EKG with QTc of 436 ms    Plan:  Discontinue aripiprazole due to cost as a barrier  Trial of risperidone 0.25 mg p.o. nightly  Continue lamotrigine/gabapentin/buspirone as scheduled  Follow-up psychiatry visit 6/19/2023

## 2023-05-25 ENCOUNTER — OFFICE VISIT (OUTPATIENT)
Dept: BEHAVIORAL HEALTH | Facility: CLINIC | Age: 73
End: 2023-05-25
Payer: COMMERCIAL

## 2023-05-25 DIAGNOSIS — F41.9 ANXIETY: Primary | ICD-10-CM

## 2023-05-25 PROCEDURE — 90834 PSYTX W PT 45 MINUTES: CPT

## 2023-05-25 NOTE — PROGRESS NOTES
"[facility] - Psychotherapy Follow-up Visit Note  Visit number: 8    Visit Type Performed: In-office     Keri Hilton presented today for a behavioral health visit.      SUBJECTIVE     Symptoms  Depression symptoms: fatigue/lack of energy and feelings of worthlessness/guilt  Anxiety symptoms: mild anxiety/worry, difficulty controlling worry, restless/\"on edge\" and muscle tension  Additional reported symptoms: NA    OBJECTIVE     Mental Status Evaluation  Patient's mood and affect were consistent with the context, and consistent with their baseline: Yes   Comments:  Calm and pleasant, awake and alert; oriented to person, place, and time    Suicidal Ideation/Homicidal Ideation Risk Assessment: not assessed. If not assessed, reason:  Previous evaluation indicated minimal risk. Pt did not endorse any changes in risk or protective factors and none were observed.     Plan for Safety-   N/A:  Risk is assessed to be minimal; therefore, developing a safety plan is not indicated at this time.    Interventions  Cognitive Behavior Therapy and Empathic Listening and Validation  We processed how Patient expresses different emotions including a history of pushing her emotions down. We processed the emotions that continue to be unresolved regarding her and her 's financial situation. We discussed current stressors including a potential move and tension with her 's children.     Psychotropic medications: no known adherence challenges, Medication was added to Patient's regimen this past week to assist with frequent emotional dysregulation (tearfulness)   Current Outpatient Medications   Medication Sig Dispense Refill   • aspirin 81 mg enteric coated tablet Take 81 mg by mouth daily.     • atorvastatin (LIPITOR) 80 mg tablet Take 80 mg by mouth daily.      • busPIRone (BUSPAR) 10 mg tablet Take 2 tablets (20 mg total) by mouth 3 (three) times a day. 540 tablet 0   • gabapentin (NEURONTIN) 600 mg " tablet TAKE 1 TABLET BY MOUTH TWICE A  tablet 1   • lamoTRIgine (LaMICtal) 200 mg tablet Take 1 tablet (200 mg total) by mouth 2 (two) times a day. 180 tablet 0   • lisinopriL (PRINIVIL) 5 mg tablet Take 5 mg by mouth once daily.     • risperiDONE (RisperDAL) 0.25 mg tablet Take 1 tablet (0.25 mg total) by mouth nightly. 30 tablet 0     No current facility-administered medications for this visit.       ASSESSMENT       Progress  Patient's progress toward their goals is generally improving as Patient further processed the impact of past experiences on her current mental health.   Patient's symptomology is gradually improving. Patient reported that she has not had a seizure like event in 2 months. Patient's mood continues to improve, however Patient reported significant stress as she and her  are waiting to hear about moving apartments and are working on downsizing.     PLAN     Goals:  Development of skills for management of nonepileptic seizures     Recommendations  Individual Therapy  45 minutes weekly    Next visit plan:  -Further discuss forgiveness of self for the situations that cause her anger    I spent 50 minutes on this date of service performing the following activities: providing counseling and education.

## 2023-06-05 ENCOUNTER — OFFICE VISIT (OUTPATIENT)
Dept: BEHAVIORAL HEALTH | Facility: CLINIC | Age: 73
End: 2023-06-05
Payer: COMMERCIAL

## 2023-06-05 DIAGNOSIS — F41.9 ANXIETY: Primary | ICD-10-CM

## 2023-06-05 PROCEDURE — 90834 PSYTX W PT 45 MINUTES: CPT

## 2023-06-06 NOTE — PROGRESS NOTES
"[facility] - Psychotherapy Follow-up Visit Note  Visit number: 9    Visit Type Performed: In-office     Keri Hilton presented today for a behavioral health visit.      SUBJECTIVE     Symptoms  Depression symptoms: fatigue/lack of energy and feelings of worthlessness/guilt  Anxiety symptoms: moderate anxiety/worry, difficulty controlling worry, restless/\"on edge\" and muscle tension  Additional reported symptoms: Mild nonepileptic seizure event    OBJECTIVE     Mental Status Evaluation  Patient's mood and affect were consistent with the context, and consistent with their baseline: Yes   Comments:  Calm and pleasant, awake and alert; oriented to person, place, and time    Suicidal Ideation/Homicidal Ideation Risk Assessment: not assessed. If not assessed, reason:  Previous evaluation indicated minimal risk. Pt did not endorse any changes in risk or protective factors and none were observed.     Plan for Safety-   N/A:  Risk is assessed to be minimal; therefore, developing a safety plan is not indicated at this time.    Interventions  Monitoring of Symptoms  We processed the circumstances surrounding a seizure-like episode including increased stress with downsizing and reminders of past conflict with her 's daughter. We processed physical symptoms that are likely not consistent with a presentation of anxiety and require further workup. We processed Patient's efforts to voice her feelings more within her relationship.     Psychotropic medications: no known adherence challenges, too early to assess effectiveness   Current Outpatient Medications   Medication Sig Dispense Refill   • aspirin 81 mg enteric coated tablet Take 81 mg by mouth daily.     • atorvastatin (LIPITOR) 80 mg tablet Take 80 mg by mouth daily.      • busPIRone (BUSPAR) 10 mg tablet Take 2 tablets (20 mg total) by mouth 3 (three) times a day. 540 tablet 0   • gabapentin (NEURONTIN) 600 mg tablet TAKE 1 TABLET BY MOUTH TWICE A "  tablet 1   • lamoTRIgine (LaMICtal) 200 mg tablet Take 1 tablet (200 mg total) by mouth 2 (two) times a day. 180 tablet 0   • lisinopriL (PRINIVIL) 5 mg tablet Take 5 mg by mouth once daily.     • risperiDONE (RisperDAL) 0.25 mg tablet Take 1 tablet (0.25 mg total) by mouth nightly. 30 tablet 0     No current facility-administered medications for this visit.       ASSESSMENT       Progress  Patient's progress toward their goals is generally improving as Patient processed the recent seizure episode and potential factors resulting in the episode.   Patient's symptomology is worsened. Patient reported a mild seizure-like episode occurred this past Saturday. She reported this was less intense and was a shorter duration that previous episodes.     Patient additionally reported symptoms including asphasia, leg shaking and hand shaking. Therapist recommended follow up with medical providers as this could be caused by a medical condition or could be side effects to medication. These symptoms do not appear consistent with anxiety symptoms.     PLAN     Goals:  Development of skills for management of nonepileptic seizures     Recommendations  Individual Therapy  45 minutes weekly    Next visit plan:  -Further discuss forgiveness of self for the situations that cause her anger as this was not addressed today    I spent 45 minutes on this date of service performing the following activities: providing counseling and education.

## 2023-06-12 ENCOUNTER — OFFICE VISIT (OUTPATIENT)
Dept: BEHAVIORAL HEALTH | Facility: CLINIC | Age: 73
End: 2023-06-12
Payer: COMMERCIAL

## 2023-06-12 DIAGNOSIS — F41.9 ANXIETY: Primary | ICD-10-CM

## 2023-06-12 PROCEDURE — 90834 PSYTX W PT 45 MINUTES: CPT

## 2023-06-13 NOTE — PROGRESS NOTES
"[facility] - Psychotherapy Follow-up Visit Note  Visit number: 10    Visit Type Performed: In-office     Keri Hilton presented today for a behavioral health visit.      SUBJECTIVE     Symptoms  Depression symptoms: fatigue/lack of energy and feelings of worthlessness/guilt  Anxiety symptoms: moderate anxiety/worry, difficulty controlling worry, restless/\"on edge\" and muscle tension  Additional reported symptoms: NA    OBJECTIVE     Mental Status Evaluation  Patient's mood and affect were consistent with the context, and consistent with their baseline: Yes   Comments:  Calm and pleasant, awake and alert; oriented to person, place, and time    Suicidal Ideation/Homicidal Ideation Risk Assessment: not assessed. If not assessed, reason:  Previous evaluation indicated minimal risk. Pt did not endorse any changes in risk or protective factors and none were observed.     Plan for Safety-   N/A:  Risk is assessed to be minimal; therefore, developing a safety plan is not indicated at this time.    Interventions  Cognitive Behavior Therapy  We processed negative self-beliefs of being a \"bad mom\" and a \"bad grandmother\". Therapist assisted Patient in challenging aspects of these beliefs through self-compassion and forgiveness of self. We discussed what is stopping Patient from showing herself compassion.     Psychotropic medications: no known adherence challenges, somewhat effective   Current Outpatient Medications   Medication Sig Dispense Refill   • aspirin 81 mg enteric coated tablet Take 81 mg by mouth daily.     • atorvastatin (LIPITOR) 80 mg tablet Take 80 mg by mouth daily.      • busPIRone (BUSPAR) 10 mg tablet Take 2 tablets (20 mg total) by mouth 3 (three) times a day. 540 tablet 0   • gabapentin (NEURONTIN) 600 mg tablet TAKE 1 TABLET BY MOUTH TWICE A  tablet 1   • lamoTRIgine (LaMICtal) 200 mg tablet Take 1 tablet (200 mg total) by mouth 2 (two) times a day. 180 tablet 0   • lisinopriL " "(PRINIVIL) 5 mg tablet Take 5 mg by mouth once daily.     • risperiDONE (RisperDAL) 0.25 mg tablet Take 1 tablet (0.25 mg total) by mouth nightly. 30 tablet 0     No current facility-administered medications for this visit.       ASSESSMENT       Progress  Patient's progress toward their goals is generally improving as Patient processed past mistakes and how they shaped Patient's view of herself.   Patient's symptomology is intermittent. Patient denied any other seizure activity since last week. Although there has been limited change in symptoms, Patient remains engaged and is working through deep-rooted self-beliefs that are likely contributing to her symptoms. Patient's current stressors related to moving have additionally intensified her anxiety and worry symptoms.       PLAN     Goals:  Development of skills for management of nonepileptic seizures     Recommendations  Individual Therapy  45 minutes weekly    Next visit plan:  -Further challenge negative self-beliefs of being a \"bad mom\" and \"bad grandmother\"    I spent 45 minutes on this date of service performing the following activities: providing counseling and education.  "

## 2023-06-19 ENCOUNTER — OFFICE VISIT (OUTPATIENT)
Dept: PSYCHIATRY | Facility: HOSPITAL | Age: 73
End: 2023-06-19
Attending: PSYCHIATRY & NEUROLOGY
Payer: COMMERCIAL

## 2023-06-19 ENCOUNTER — OFFICE VISIT (OUTPATIENT)
Dept: BEHAVIORAL HEALTH | Facility: CLINIC | Age: 73
End: 2023-06-19
Payer: COMMERCIAL

## 2023-06-19 DIAGNOSIS — F41.9 ANXIETY: Primary | ICD-10-CM

## 2023-06-19 DIAGNOSIS — F44.5 PSYCHOGENIC NONEPILEPTIC SEIZURE: ICD-10-CM

## 2023-06-19 DIAGNOSIS — F41.1 GENERALIZED ANXIETY DISORDER: ICD-10-CM

## 2023-06-19 PROCEDURE — 90834 PSYTX W PT 45 MINUTES: CPT

## 2023-06-19 PROCEDURE — 99214 OFFICE O/P EST MOD 30 MIN: CPT | Performed by: PSYCHIATRY & NEUROLOGY

## 2023-06-19 RX ORDER — RISPERIDONE 0.5 MG/1
0.5 TABLET ORAL NIGHTLY
Qty: 30 TABLET | Refills: 1 | Status: SHIPPED | OUTPATIENT
Start: 2023-06-19 | End: 2023-07-31 | Stop reason: SDUPTHER

## 2023-06-19 ASSESSMENT — ABNORMAL INVOLUNTARY MOVEMENT SCALE (AIMS)
CURRENT_PROBLEMS_TEETH_DENTURES: NO
AIMS_SEVERITY: NONE, NORMAL
TOTAL_SCORE: 0
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
## 2023-06-20 NOTE — PROGRESS NOTES
"[facility] - Psychotherapy Follow-up Visit Note  Visit number: 11    Visit Type Performed: In-office     Keri Hilton presented today for a behavioral health visit.      SUBJECTIVE     Symptoms  Depression symptoms: fatigue/lack of energy and feelings of worthlessness/guilt  Anxiety symptoms: moderate anxiety/worry, restless/\"on edge\" and muscle tension  Additional reported symptoms: NA    OBJECTIVE     Mental Status Evaluation  Patient's mood and affect were consistent with the context, and consistent with their baseline: Yes   Comments:  Calm and pleasant, awake and alert; oriented to person, place, and time    Suicidal Ideation/Homicidal Ideation Risk Assessment: not assessed. If not assessed, reason:  Previous evaluation indicated minimal risk. Pt did not endorse any changes in risk or protective factors and none were observed.     Plan for Safety-   N/A:  Risk is assessed to be minimal; therefore, developing a safety plan is not indicated at this time.    Interventions  Cognitive Behavior Therapy  We discussed concerns for side effects or other possible medical conditions that Patient has noticed since taking her medication that she plans to discuss with her psychiatrist today. We processed a significant conversation with her  yesterday regarding all of their recent stressors which appeared to be a step in the right direction for improvement with these stressors. We processed how her oldest son's behaviors impact Patient's affect around him and his children. We discussed Patient's readiness for having tough conversations with her sons and brother.     Psychotropic medications: no known adherence challenges, somewhat effective   Current Outpatient Medications   Medication Sig Dispense Refill   • aspirin 81 mg enteric coated tablet Take 81 mg by mouth daily.     • atorvastatin (LIPITOR) 80 mg tablet Take 80 mg by mouth daily.      • busPIRone (BUSPAR) 10 mg tablet Take 2 tablets (20 mg " "total) by mouth 3 (three) times a day. 540 tablet 0   • gabapentin (NEURONTIN) 600 mg tablet TAKE 1 TABLET BY MOUTH TWICE A  tablet 1   • lamoTRIgine (LaMICtal) 200 mg tablet Take 1 tablet (200 mg total) by mouth 2 (two) times a day. 180 tablet 0   • lisinopriL (PRINIVIL) 5 mg tablet Take 5 mg by mouth once daily.     • risperiDONE (RisperDAL) 0.5 mg tablet Take 1 tablet (0.5 mg total) by mouth nightly. 30 tablet 1     No current facility-administered medications for this visit.       ASSESSMENT       Progress  Patient's progress toward their goals is generally improving as Patient processed recent and possible future conversations with those in her life about her stressors.   Patient's symptomology is intermittent. Patient denied recent seizure activity. Patient's current stressors related to moving have additionally intensified her anxiety and worry symptoms.       PLAN     Goals:  Development of skills for management of nonepileptic seizures     Recommendations  Individual Therapy  45 minutes weekly    Next visit plan:  -Further challenge negative self-beliefs of being a \"bad mom\" and \"bad grandmother\" as this was not addressed today    I spent 45 minutes on this date of service performing the following activities: providing counseling and education.  "

## 2023-06-30 ENCOUNTER — OFFICE VISIT (OUTPATIENT)
Dept: BEHAVIORAL HEALTH | Facility: CLINIC | Age: 73
End: 2023-06-30
Payer: COMMERCIAL

## 2023-06-30 DIAGNOSIS — F41.9 ANXIETY: Primary | ICD-10-CM

## 2023-06-30 PROCEDURE — 90834 PSYTX W PT 45 MINUTES: CPT
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
## 2023-07-20 ENCOUNTER — TELEPHONE (OUTPATIENT)
Dept: PSYCHIATRY | Facility: CLINIC | Age: 73
End: 2023-07-20

## 2023-07-24 ENCOUNTER — TELEPHONE (OUTPATIENT)
Dept: PSYCHIATRY | Facility: HOSPITAL | Age: 73
End: 2023-07-24
Payer: COMMERCIAL

## 2023-07-24 ENCOUNTER — OFFICE VISIT (OUTPATIENT)
Dept: FAMILY MEDICINE | Facility: CLINIC | Age: 73
End: 2023-07-24
Payer: COMMERCIAL

## 2023-07-24 ENCOUNTER — TELEPHONE (OUTPATIENT)
Dept: FAMILY MEDICINE | Facility: CLINIC | Age: 73
End: 2023-07-24

## 2023-07-24 VITALS
TEMPERATURE: 97.5 F | DIASTOLIC BLOOD PRESSURE: 64 MMHG | RESPIRATION RATE: 16 BRPM | HEART RATE: 67 BPM | WEIGHT: 160.6 LBS | HEIGHT: 65 IN | BODY MASS INDEX: 26.76 KG/M2 | OXYGEN SATURATION: 97 % | SYSTOLIC BLOOD PRESSURE: 136 MMHG

## 2023-07-24 DIAGNOSIS — I10 PRIMARY HYPERTENSION: ICD-10-CM

## 2023-07-24 DIAGNOSIS — R60.0 BILATERAL LEG EDEMA: ICD-10-CM

## 2023-07-24 DIAGNOSIS — R21 SKIN RASH: Primary | ICD-10-CM

## 2023-07-24 DIAGNOSIS — R56.9 SEIZURE-LIKE ACTIVITY (CMS/HCC): ICD-10-CM

## 2023-07-24 PROCEDURE — 99214 OFFICE O/P EST MOD 30 MIN: CPT | Performed by: FAMILY MEDICINE

## 2023-07-24 PROCEDURE — 3078F DIAST BP <80 MM HG: CPT | Performed by: FAMILY MEDICINE

## 2023-07-24 PROCEDURE — 3075F SYST BP GE 130 - 139MM HG: CPT | Performed by: FAMILY MEDICINE

## 2023-07-24 PROCEDURE — 3008F BODY MASS INDEX DOCD: CPT | Performed by: FAMILY MEDICINE

## 2023-07-24 ASSESSMENT — ENCOUNTER SYMPTOMS
WHEEZING: 0
WOUND: 0
WEAKNESS: 0
COUGH: 0
CHILLS: 0
FEVER: 0
SHORTNESS OF BREATH: 0
NUMBNESS: 0
COLOR CHANGE: 1
FATIGUE: 0

## 2023-07-24 NOTE — TELEPHONE ENCOUNTER
Patient states that this has been happening for a few weeks. She said within the past week, there has been redness to the ankles. She is uncomfortable but no pain or warmth to the touch. Patient scheduled with Dr. Gloria leonard at 6:30pm.

## 2023-07-24 NOTE — TELEPHONE ENCOUNTER
Patient called with complaints of swelling of R ankle past week. Call transferred to [place]  for triage.

## 2023-07-24 NOTE — TELEPHONE ENCOUNTER
Keri left medline message reporting that she is experiencing a reaction to medication.  On call back Keri explained that last Saturday while at the beach she noticed redness to right lower leg which she believed to be sunburn but over the next few days worsened.  She now reports swelling to both legs and that the redness to right leg was a bright raspberry color however is now a lighter strawberry color.  She denies any recent changes in meds other than increase of risperidone on 6/19.  She states she has not made any changes to her medications and has been compliant.  She denies pain, itching.  Keri was urged to contact PCP for evaluation; if PCP not available to see her today instructed to go to UC or ED for evaluation and treatment.  Keri acknowledges understanding of information discussed and voiced no other concerns or needs at time of call.     Keri is aware of how to contact office prior to next appointment with any issues, after hours resources etc, patient instructed to call 911 or go to nearest ED if thoughts of self harm, suicide , or violence towards others occurs.

## 2023-07-24 NOTE — TELEPHONE ENCOUNTER
"Chart reviewed:  Risperdal increased to 0.5 nightly June 19, 2023  Buspirone 20 mg 3 times daily continued  Lamotrigine 200 mg p.o. nightly continued at June 19, 2023 appointment  Gabapentin 600 mg twice daily continued per neurology    Constipated, more tired, more depressed.  Feels the increase of Risperdal has been overall more negative than positive. Patient moved and found it very stressful. Three episodes of shaking last weekend. \"Pretty bad.\"    Spoke with , Uche Hilton. Witnessed episodes, \"reasonably severe, she can hear but cannot respond...\" Occurred while on the couch. Some word finding difficulty.Came out of them tired but back to herself.    Call earlier this afternoon with report of rash.  Saw PCP, will do some blood work.  Will take a picture every day    Started as a big red splotch on ankles 10 days ago. Has lower extremity swelling.  No fever, no flu like symptoms, no mouth or eye sores  Reviewed concern for rash while on lamotrigine.    Proceed directly to nearest ED if you develop fever, myalgias eye grittiness, mucosal membrane involvement, and/or dysuria as discontinuation of treatment may not prevent a rash from becoming life-threatening.    Impression: Overall decline in mood, increased episodes over the last several weeks.  Suspect increased stress related to move a contributing factor.  However, unexplained rash is persistent is of significant concern.  Reviewed detailed risks and benefits with patient, at this time risks of continuing lamotrigine outweigh benefits.    Plan:  Discontinue lamotrigine  Continue Risperdal 0.5 mg p.o. nightly, last increased June 2023  Patient scheduled for follow-up 7/31/2023  As above, patient encouraged to continue follow-up with PCP, call the office for any questions and/or present to the ED for symptoms as outlined above.      "

## 2023-07-24 NOTE — PATIENT INSTRUCTIONS
Please get the lab work done.  Keep an eye on the rash and let me know if it is spreading or other changes--fevers, warmth, drainage etc.   
Home

## 2023-07-24 NOTE — PROGRESS NOTES
"      Subjective      Patient ID: Keri Hilton is a 72 y.o. female.  1950      HPI    Here today for change in color of the ankles. [place] thought initially it might have been a sunburn. Has redness in both ankles R>L. Has had swelling in both ankles for months, no changes. Not having any pain. Started the risperidone about 7 weeks ago and dose increased 3 weeks ago and does not like how she feels on the higher dose. Has an appointment on Monday with the psychiatrist to discuss.    The following have been reviewed and updated as appropriate in this visit:   Allergies  Meds  Problems       Review of Systems   Constitutional: Negative for chills, fatigue and fever.   Respiratory: Negative for cough, shortness of breath and wheezing.    Cardiovascular: Positive for leg swelling. Negative for chest pain.   Skin: Positive for color change. Negative for wound.   Neurological: Negative for weakness and numbness.       Objective     Vitals:    07/24/23 1636   BP: 136/64   BP Location: Left upper arm   Patient Position: Sitting   Pulse: 67   Resp: 16   Temp: 36.4 °C (97.5 °F)   TempSrc: Temporal   SpO2: 97%   Weight: 72.8 kg (160 lb 9.6 oz)   Height: 1.645 m (5' 4.75\")     Body mass index is 26.93 kg/m².    Physical Exam  Vitals and nursing note reviewed.   Constitutional:       General: She is not in acute distress.     Appearance: Normal appearance. She is not ill-appearing.   Cardiovascular:      Rate and Rhythm: Normal rate and regular rhythm.      Heart sounds: Normal heart sounds. No murmur heard.  Pulmonary:      Effort: Pulmonary effort is normal. No respiratory distress.      Breath sounds: Normal breath sounds. No stridor. No wheezing, rhonchi or rales.   Musculoskeletal:      Comments: Trace edema in bilateral ankles   Skin:     Comments: No warmth  Mild erythema from anterior ankle to midcalf on right side, fainter on the left side   Neurological:      Mental Status: She is alert. "         Assessment/Plan   Diagnoses and all orders for this visit:    Skin rash (Primary)  Assessment & Plan:  ?sun sensitivity vs stasis dermatitis. Per patient has started to improve today. Will continue to monitor and call if any changes or other symptoms develop. Gave slip to get lab work done.    Orders:  -     CBC and Differential; Future    Bilateral leg edema  Assessment & Plan:  Continue to keep legs elevated when not on them and watch salt intake to help.    Orders:  -     Basic metabolic panel; Future    Primary hypertension  Assessment & Plan:  Stable on medication.      Seizure-like activity (CMS/HCC)  Assessment & Plan:  Following with psychiatrist.

## 2023-07-24 NOTE — ASSESSMENT & PLAN NOTE
?sun sensitivity vs stasis dermatitis. Per patient has started to improve today. Will continue to monitor and call if any changes or other symptoms develop. Gave slip to get lab work done.

## 2023-07-28 ENCOUNTER — OFFICE VISIT (OUTPATIENT)
Dept: BEHAVIORAL HEALTH | Facility: CLINIC | Age: 73
End: 2023-07-28
Payer: COMMERCIAL

## 2023-07-28 DIAGNOSIS — F41.9 ANXIETY: Primary | ICD-10-CM

## 2023-07-28 PROCEDURE — 90834 PSYTX W PT 45 MINUTES: CPT

## 2023-07-28 NOTE — PROGRESS NOTES
[facility] - Psychotherapy Follow-up Visit Note  Visit number: 13    Visit Type Performed: In-office     Keri Hilton presented today for a behavioral health visit.      SUBJECTIVE     Symptoms  Depression symptoms: fatigue/lack of energy  Anxiety symptoms: moderate anxiety/worry, difficulty controlling worry, muscle tension and seizure like episode  Additional reported symptoms: NA    OBJECTIVE     Mental Status Evaluation  Patient's mood and affect were consistent with the context, and consistent with their baseline: Yes   Comments:  Calm and pleasant, awake and alert; oriented to person, place, and time    Suicidal Ideation/Homicidal Ideation Risk Assessment: not assessed. If not assessed, reason:  Previous evaluation indicated minimal risk. Pt did not endorse any changes in risk or protective factors and none were observed.     Plan for Safety-   N/A:  Risk is assessed to be minimal; therefore, developing a safety plan is not indicated at this time.    Interventions  Anxiety Reduction Techniques and Cognitive Behavior Therapy  We processed recent stressors including Patient's move and conflict with her children while on vacation that likely resulted in a recent seizure like episode. We discussed medication side effects and other potential medical concerns additionally impacting Patient's quality of life. We discussed exercise as a way to help with some of Patient's physical concerns.  We began to process Patient's efforts to discuss his childhood with her youngest son.     Psychotropic medications: no known adherence challenges, too early to assess effectiveness as changes have been made   Current Outpatient Medications   Medication Sig Dispense Refill   • aspirin 81 mg enteric coated tablet Take 81 mg by mouth daily.     • atorvastatin (LIPITOR) 80 mg tablet Take 80 mg by mouth daily.      • busPIRone (BUSPAR) 10 mg tablet Take 2 tablets (20 mg total) by mouth 3 (three) times a day. 540  tablet 0   • gabapentin (NEURONTIN) 600 mg tablet TAKE 1 TABLET BY MOUTH TWICE A  tablet 1   • lisinopriL (PRINIVIL) 5 mg tablet Take 2.5 mg by mouth once daily.     • risperiDONE (RisperDAL) 0.5 mg tablet Take 1 tablet (0.5 mg total) by mouth nightly. 30 tablet 1     No current facility-administered medications for this visit.       ASSESSMENT       Progress  Patient's progress toward their goals is generally improving as Patient processed the negative experiences over the past few weeks with the move and her family vacation.   Patient's symptomology has slightly worsened.. Patient disclosed three recent episodes of seizure-like activity likely attributed to increased stress during Patient's move and while on vacation with her children and their families.     PLAN     Goals:  Development of skills for management of nonepileptic seizures     Recommendations  Individual Therapy  45 minutes weekly    Next visit plan:  -Further process efforts to share her experiences with her children    I spent 45 minutes on this date of service performing the following activities: providing counseling and education.

## 2023-07-31 ENCOUNTER — OFFICE VISIT (OUTPATIENT)
Dept: PSYCHIATRY | Facility: HOSPITAL | Age: 73
End: 2023-07-31
Attending: PSYCHIATRY & NEUROLOGY
Payer: COMMERCIAL

## 2023-07-31 DIAGNOSIS — F41.1 GENERALIZED ANXIETY DISORDER: Primary | ICD-10-CM

## 2023-07-31 DIAGNOSIS — F44.5 PSYCHOGENIC NONEPILEPTIC SEIZURE: ICD-10-CM

## 2023-07-31 PROCEDURE — 99215 OFFICE O/P EST HI 40 MIN: CPT | Performed by: PSYCHIATRY & NEUROLOGY

## 2023-07-31 RX ORDER — RISPERIDONE 0.5 MG/1
0.5 TABLET ORAL NIGHTLY
Qty: 90 TABLET | Refills: 0 | Status: SHIPPED | OUTPATIENT
Start: 2023-07-31 | End: 2023-08-17

## 2023-07-31 RX ORDER — RISPERIDONE 0.25 MG/1
0.5 TABLET ORAL 2 TIMES DAILY PRN
Qty: 30 TABLET | Refills: 0 | Status: SHIPPED | OUTPATIENT
Start: 2023-07-31 | End: 2023-08-17
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
## 2023-08-03 LAB
BASOPHILS # BLD AUTO: 0 X10E3/UL (ref 0–0.2)
BASOPHILS NFR BLD AUTO: 1 %
BUN SERPL-MCNC: 11 MG/DL (ref 8–27)
BUN/CREAT SERPL: 18 (ref 12–28)
CALCIUM SERPL-MCNC: 10 MG/DL (ref 8.7–10.3)
CHLORIDE SERPL-SCNC: 106 MMOL/L (ref 96–106)
CO2 SERPL-SCNC: 26 MMOL/L (ref 20–29)
CREAT SERPL-MCNC: 0.61 MG/DL (ref 0.57–1)
EGFRCR SERPLBLD CKD-EPI 2021: 96 ML/MIN/1.73
EOSINOPHIL # BLD AUTO: 0.2 X10E3/UL (ref 0–0.4)
EOSINOPHIL NFR BLD AUTO: 2 %
ERYTHROCYTE [DISTWIDTH] IN BLOOD BY AUTOMATED COUNT: 12.5 % (ref 11.7–15.4)
GLUCOSE SERPL-MCNC: 94 MG/DL (ref 70–99)
HCT VFR BLD AUTO: 36.6 % (ref 34–46.6)
HGB BLD-MCNC: 12.3 G/DL (ref 11.1–15.9)
IMM GRANULOCYTES # BLD AUTO: 0 X10E3/UL (ref 0–0.1)
IMM GRANULOCYTES NFR BLD AUTO: 0 %
LYMPHOCYTES # BLD AUTO: 2.1 X10E3/UL (ref 0.7–3.1)
LYMPHOCYTES NFR BLD AUTO: 32 %
MCH RBC QN AUTO: 30.2 PG (ref 26.6–33)
MCHC RBC AUTO-ENTMCNC: 33.6 G/DL (ref 31.5–35.7)
MCV RBC AUTO: 90 FL (ref 79–97)
MONOCYTES # BLD AUTO: 0.8 X10E3/UL (ref 0.1–0.9)
MONOCYTES NFR BLD AUTO: 13 %
NEUTROPHILS # BLD AUTO: 3.4 X10E3/UL (ref 1.4–7)
NEUTROPHILS NFR BLD AUTO: 52 %
PLATELET # BLD AUTO: 200 X10E3/UL (ref 150–450)
POTASSIUM SERPL-SCNC: 4.1 MMOL/L (ref 3.5–5.2)
RBC # BLD AUTO: 4.07 X10E6/UL (ref 3.77–5.28)
SODIUM SERPL-SCNC: 145 MMOL/L (ref 134–144)
WBC # BLD AUTO: 6.6 X10E3/UL (ref 3.4–10.8)

## 2023-08-07 ENCOUNTER — OFFICE VISIT (OUTPATIENT)
Dept: BEHAVIORAL HEALTH | Facility: CLINIC | Age: 73
End: 2023-08-07
Payer: COMMERCIAL

## 2023-08-07 DIAGNOSIS — F41.9 ANXIETY: Primary | ICD-10-CM

## 2023-08-07 PROCEDURE — 90834 PSYTX W PT 45 MINUTES: CPT

## 2023-08-07 NOTE — PROGRESS NOTES
"[facility] - Psychotherapy Follow-up Visit Note  Visit number: 14    Visit Type Performed: In-office     Keri Hilton presented today for a behavioral health visit.      SUBJECTIVE     Symptoms  Depression symptoms: depressed mood, anhedonia, lack of motivation, psychomotor agitation, fatigue/lack of energy and change in appetite  Anxiety symptoms: excessive anxiety/worry, difficulty controlling worry, restless/\"on edge\", difficulty concentrating/going \"blank\", irritability and muscle tension  Additional reported symptoms: NA    OBJECTIVE     Mental Status Evaluation  Patient's mood and affect were consistent with the context, and consistent with their baseline: Yes   Comments:  Anxious; pleasant, but frustrated with the circumstances; awake and alert; oriented to person, place, and time    Suicidal Ideation/Homicidal Ideation Risk Assessment: not assessed. If not assessed, reason:  Previous evaluation indicated minimal risk. Pt did not endorse any changes in risk or protective factors and none were observed.     Plan for Safety-   N/A:  Risk is assessed to be minimal; therefore, developing a safety plan is not indicated at this time.    Interventions  Empathic Listening and Validation, Mindfulness and Monitoring of Symptoms  We processed worsening symptoms and how they are impacting Patient's daily functioning. We discussed potential causes of these symptoms and Patient likely attributes them to medication changes. Patient provided permission for Therapist to follow up with her psychiatrist regarding these concerns. We discussed deep breathing and the effectiveness of utilizing grounding techniques to calm Patient in moments of high stress.     Psychotropic medications: no known adherence challenges, made things worse per report    Current Outpatient Medications   Medication Sig Dispense Refill   • aspirin 81 mg enteric coated tablet Take 81 mg by mouth daily.     • atorvastatin (LIPITOR) 80 mg " "tablet Take 80 mg by mouth daily.      • busPIRone (BUSPAR) 10 mg tablet Take 2 tablets (20 mg total) by mouth 3 (three) times a day. 540 tablet 0   • gabapentin (NEURONTIN) 600 mg tablet TAKE 1 TABLET BY MOUTH TWICE A  tablet 1   • lisinopriL (PRINIVIL) 5 mg tablet Take 2.5 mg by mouth once daily.     • risperiDONE (RisperDAL) 0.25 mg tablet Take 2 tablets (0.5 mg total) by mouth 2 (two) times a day as needed (agitation). 30 tablet 0   • risperiDONE (RisperDAL) 0.5 mg tablet Take 1 tablet (0.5 mg total) by mouth nightly. 90 tablet 0     No current facility-administered medications for this visit.       ASSESSMENT       Progress  Patient's progress toward their goals is generally improving as Patient processed symptoms impacting Patient's current quality of life.   Patient's symptomology has worsening since 10 day(s) ago. Patient disclosed persistent high levels of anxiety and difficulty controlling \"up and down\" moods. She reported low motivation and difficulty completing ADLs. Therapist witnessed multiple moments where Patient lost her train of thought completely.     Therapist spoke with Patricia Vinson MD with [facility] following session for coordination of care. We discussed Patient's current symptoms and the likely connection to recent medication changes. We discussed appropriate levels of care and follow up moving forward.     PLAN     Goals:  Development of skills for management of nonepileptic seizures     Recommendations  Individual Therapy  45 minutes weekly    Next visit plan:  -Discuss appropriate level of care  -Further process efforts to share her experiences with her children when able    I spent 45 minutes on this date of service performing the following activities: providing counseling and education.  "

## 2023-08-08 ENCOUNTER — TELEPHONE (OUTPATIENT)
Dept: PSYCHIATRY | Facility: HOSPITAL | Age: 73
End: 2023-08-08
Payer: COMMERCIAL

## 2023-08-08 NOTE — TELEPHONE ENCOUNTER
Keri called medline requesting call back in ref to medications.  She reports that last week she had visit with Dr Vinson and discussed her concerns with moments of increased anxiety.  She reports that these moments are ongoing , when they occur they last about 10 minutes, during this time she experiences SOB, difficulty concentrating, feels shaky inside, knot in her stomach, has blurred vision.  She reports that these moments happen at work and she can sit and practice breathing and use distraction and the situation subsides.  She reports not being able to apply the same skills at home and she lays on her sofa in a ball.  She denies SI/HI.  Informed that this information will be relayed to Dr Vinson and recommendations will be communicated back.  Keri had no additional concerns or needs at time of call.    Keri is aware of how to contact office prior to next appointment with any issues, after hours resources etc, patient instructed to call 911 or go to nearest ED if thoughts of self harm, suicide , or violence towards others occurs.

## 2023-08-08 NOTE — TELEPHONE ENCOUNTER
F/u call to Keri informing no changes to meds; encouraging her to discuss anxiety further during IT visit Monday, reach out to [facility] if there are additional needs or concerns , or report to ED/UC should any emergent needs arise.  Earlier today Keri voiced she would be at work and to leave any information or recommendations on VM.  LVM with information and contact details.

## 2023-08-14 ENCOUNTER — OFFICE VISIT (OUTPATIENT)
Dept: BEHAVIORAL HEALTH | Facility: CLINIC | Age: 73
End: 2023-08-14
Payer: COMMERCIAL

## 2023-08-14 DIAGNOSIS — F41.9 ANXIETY: Primary | ICD-10-CM

## 2023-08-14 PROCEDURE — 90834 PSYTX W PT 45 MINUTES: CPT

## 2023-08-15 NOTE — PROGRESS NOTES
"[facility] - Psychotherapy Follow-up Visit Note  Visit number: 15    Visit Type Performed: In-office     Keri Hilton presented today for a behavioral health visit.      SUBJECTIVE     Symptoms  Depression symptoms: depressed mood, anhedonia, lack of motivation, psychomotor agitation, fatigue/lack of energy and change in appetite  Anxiety symptoms: excessive anxiety/worry, difficulty controlling worry, restless/\"on edge\", difficulty concentrating/going \"blank\", irritability and muscle tension  Additional reported symptoms: NA    OBJECTIVE     Mental Status Evaluation  Patient's mood and affect were consistent with the context, and consistent with their baseline: Yes   Comments:  Anxious; pleasant, but frustrated with the circumstances; awake and alert; oriented to person, place, and time    Suicidal Ideation/Homicidal Ideation Risk Assessment: assessed.     Patient disclosed that last week, she felt \"dead\" and acknowledged negative thoughts about not wanting to be here. She disclosed she felt hopeless with the current side effects and ineffectiveness of her medication. Patient denied intent to act on these thoughts and reported that the only way to be successful with suicidal thoughts would be to use a gun, which she denied having access to. Patient denied current suicidal thoughts. Patient confirmed that deep breathing exercises have been effective in coping through some of these negative thoughts and times of emotional dysregulation.     Interventions  Monitoring of Symptoms  We further processed the worsening of Patient's symptoms. Patient shared that things were slightly better than last week, but she continues to feel extremely tired and unable to control her emotions which is scaring her. Therapist shared her discussion with Patient's psychiatrist and the recommendation for PHP. We discussed the process for enrolling in PHP at [facility]. We additionally processed the efforts made by her family " "regarding her birthday this past week and her recognition that she often expects less than what she actually receives. We discussed the birthday wishes Patient received from her estranged brother and her response to him that hopefully will open lines of communication regarding their estrangement.     Psychotropic medications: no known adherence challenges, made things worse per report    Current Outpatient Medications   Medication Sig Dispense Refill   • aspirin 81 mg enteric coated tablet Take 81 mg by mouth daily.     • atorvastatin (LIPITOR) 80 mg tablet Take 80 mg by mouth daily.      • busPIRone (BUSPAR) 10 mg tablet Take 2 tablets (20 mg total) by mouth 3 (three) times a day. 540 tablet 0   • gabapentin (NEURONTIN) 600 mg tablet TAKE 1 TABLET BY MOUTH TWICE A  tablet 1   • lisinopriL (PRINIVIL) 5 mg tablet Take 2.5 mg by mouth once daily.     • risperiDONE (RisperDAL) 0.25 mg tablet Take 2 tablets (0.5 mg total) by mouth 2 (two) times a day as needed (agitation). 30 tablet 0   • risperiDONE (RisperDAL) 0.5 mg tablet Take 1 tablet (0.5 mg total) by mouth nightly. 90 tablet 0     No current facility-administered medications for this visit.       ASSESSMENT       Progress  Patient's progress toward their goals is generally improving as Patient further processed symptoms impacting Patient's current quality of life and discussed appropriate level of care.  Patient's symptomology has worsening since 2 week(s) ago. Patient disclosed persistent high levels of anxiety and difficulty controlling \"up and down\" moods. She disclosed increased irritability, lack of concentration, and hopelessness. She reported low motivation and difficulty completing ADLs.     PLAN     Goals:  Development of skills for management of nonepileptic seizures     Recommendations  Individual Therapy  45 minutes weekly     Therapist recommended [facility] due to worsening symptomology and for medication " stabilization. Patient is aligned with this recommendation and will contact [facility] today to start the intake process. Therapist has notified [facility] and [facility] to be prepared for Patient's call.     Next visit plan:  -Follow up on plan for PHP    I spent 40 minutes on this date of service performing the following activities: providing counseling and education.

## 2023-08-17 ENCOUNTER — PHP (OUTPATIENT)
Dept: PSYCHIATRY | Facility: HOSPITAL | Age: 73
End: 2023-08-17
Attending: NURSE PRACTITIONER
Payer: COMMERCIAL

## 2023-08-17 ENCOUNTER — PHP (OUTPATIENT)
Dept: PSYCHIATRY | Facility: HOSPITAL | Age: 73
End: 2023-08-17
Payer: COMMERCIAL

## 2023-08-17 DIAGNOSIS — F41.1 GENERALIZED ANXIETY DISORDER: Primary | ICD-10-CM

## 2023-08-17 DIAGNOSIS — F44.5 PSYCHOGENIC NONEPILEPTIC SEIZURE: ICD-10-CM

## 2023-08-17 PROCEDURE — 90792 PSYCH DIAG EVAL W/MED SRVCS: CPT | Performed by: NURSE PRACTITIONER

## 2023-08-17 PROCEDURE — 90791 PSYCH DIAGNOSTIC EVALUATION: CPT

## 2023-08-17 RX ORDER — ARIPIPRAZOLE 2 MG/1
2 TABLET ORAL DAILY
Qty: 15 TABLET | Refills: 0 | Status: SHIPPED | OUTPATIENT
Start: 2023-08-17 | End: 2023-08-23 | Stop reason: SDUPTHER

## 2023-08-17 ASSESSMENT — COGNITIVE AND FUNCTIONAL STATUS - GENERAL
SPEECH: REGULAR
ORIENTATION: FULLY ORIENTED
REMOTE MEMORY: MODERATE LOSS
ATTENTION: WNL
DELUSIONS: NONE OR AGE APPROPRIATE
CONCENTRATION: IMPAIRED, MILD
AFFECT: FULL RANGE
INSIGHT: IMPAIRED, MINIMALLY
AROUSAL LEVEL: ALERT
RECENT MEMORY: MILD LOSS
EST. PREMORBID INTELLIGENCE: AVERAGE
SLEEP_WAKE_CYCLE: DECREASED
LIBIDO: NO CHANGE
IMPULSE CONTROL: INTACT
PERCEPTUAL FUNCTION: NORMAL
PSYCHOMOTOR FUNCTIONING: WNL
APPETITE: DECREASED
MOOD: HOPEFUL;MOTIVATED;DEPRESSED;ANXIOUS
EYE_CONTACT: WNL
THOUGHT_CONTENT: APPROPRIATE
APPEARANCE: WELL GROOMED

## 2023-08-17 ASSESSMENT — ABNORMAL INVOLUNTARY MOVEMENT SCALE (AIMS)
AIMS_SEVERITY: NONE, NORMAL
CURRENT_PROBLEMS_TEETH_DENTURES: NO
TOTAL_SCORE: 0

## 2023-08-17 ASSESSMENT — ENCOUNTER SYMPTOMS
DIZZINESS: 0
GASTROINTESTINAL NEGATIVE: 1
FATIGUE: 1
TREMORS: 0
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
## 2023-08-18 ENCOUNTER — PHP (OUTPATIENT)
Dept: PSYCHIATRY | Facility: HOSPITAL | Age: 73
End: 2023-08-18
Attending: NURSE PRACTITIONER
Payer: COMMERCIAL

## 2023-08-18 ENCOUNTER — NURSE ONLY (OUTPATIENT)
Dept: PSYCHIATRY | Facility: HOSPITAL | Age: 73
End: 2023-08-18
Payer: COMMERCIAL

## 2023-08-18 VITALS
HEART RATE: 64 BPM | WEIGHT: 168.2 LBS | DIASTOLIC BLOOD PRESSURE: 76 MMHG | RESPIRATION RATE: 16 BRPM | HEIGHT: 65 IN | OXYGEN SATURATION: 94 % | BODY MASS INDEX: 28.02 KG/M2 | SYSTOLIC BLOOD PRESSURE: 121 MMHG

## 2023-08-18 DIAGNOSIS — F41.1 GENERALIZED ANXIETY DISORDER: Primary | ICD-10-CM

## 2023-08-18 DIAGNOSIS — F44.5 PSYCHOGENIC NONEPILEPTIC SEIZURE: ICD-10-CM

## 2023-08-18 PROCEDURE — H0035 MH PARTIAL HOSP TX UNDER 24H: HCPCS

## 2023-08-18 ASSESSMENT — ENCOUNTER SYMPTOMS
GASTROINTESTINAL NEGATIVE: 1
ALLERGIC/IMMUNOLOGIC NEGATIVE: 1
EYES NEGATIVE: 1
CONSTITUTIONAL NEGATIVE: 1
DYSPHORIC MOOD: 1
CARDIOVASCULAR NEGATIVE: 1
BACK PAIN: 1
NEUROLOGICAL NEGATIVE: 1
NERVOUS/ANXIOUS: 1
ENDOCRINE NEGATIVE: 1
SLEEP DISTURBANCE: 1
RESPIRATORY NEGATIVE: 1
HEMATOLOGIC/LYMPHATIC NEGATIVE: 1

## 2023-08-18 ASSESSMENT — PAIN SCALES - GENERAL: PAINLEVEL: 0-NO PAIN

## 2023-08-18 NOTE — GROUP NOTE
"Date:  8/18/2023  Start Time:  12:10 PM  End Time:   1:10 PM  Keri Hilton, YOB: 1950  Psychoeducational/Skills Based Group  8 members attended group today    Group Focus: Goal of group was to introduce skills and psychoeducation related to topic of PHP day.   Group members were provided instruction and opportunities to practice presented skills.  Clinician answered questions as they arose and shared how presented skills can be utilized on a daily basis to increase emotional wellness.      About the Group: Self Compassion/Self Love Session 10: Self Compassion/Self Love Psycho Ed/Skills Based Group Summary   Topic of Curriculum was “Self Compassion/Self Love”. Clinician showed Christa Dominguez’s video describing Self-Compassion to help group members understand the use of self compassion as a coping skill. Clinician facilitated discussion as a group directed toward “negative thoughts about self that can be transformed into self-compassionate statements.”  For example, asking members to share negative thoughts that often go through their mind, “Would you say that to someone you love? What might you say instead?” Clinician introduced a few examples of Self-compassionate phrases: “I am only human.” “I am doing the best I can.” “This is really hard right now and others have felt this way too.”  Group members then discussed their experiences and potential utilization of self compassion to help them in the healing process. Clinician closed group session with a 2 minute breathing meditation to help group members practice grounding techniques    Brook Escobedo MD was onsite when services were rendered.          Patient  was an active group participant.  Assessment/observation of group participation/presentation: Keri was engaged during group. She was observed participating in a journal exercise. She shared how she wrote about her \"neediness\" and ways she practiced more compassion and acceptance.   Treatment " plan areas addressed: Coping Skills, Mindfulness and Self-compassion  Visit Diagnosis:      ICD-10-CM ICD-9-CM   1. Generalized anxiety disorder  F41.1 300.02   2. 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  Pt to F/U with local ED/call 911 should SI/HI arises.    Britta Menon LPC

## 2023-08-18 NOTE — GROUP NOTE
Date: 8/18/2023  Start Time:   9:30 AM  End Time: 10:30 AM    Keri Hilton, YOB: 1950,  participated when called upon by the .    Community Check In Group  7 attended group today.     Group Focus: Goal of group was to welcome new and returning PHP members to the PHP, check in regarding group member needs, and assess safety.    About the Group: Clinician reviewed [facility] Group Code of Conduct and answered any questions that arose.  Clinician welcomed new members to the group and facilitated brief introductions of group members to one another.  Introduced topic/theme for the treatment day:Self Compassion.  Encouraged group members to request support throughout the day as needed.  Clinician reviewed group members’ Morning Reflection Sheets and did a verbal check in with each group member regarding safety (SI/HI/self harm), safety planning, medication compliance, if they needed to consult with anyone today and individual treatment needs/goals for the day.  Session ended with a brief meditation/calming activity.   Brook Escobedo MD was on site when services rendered.      Morning Reflection:   Depression:  3/5  Anxiety:  3/5  Anger:  2/5  Thoughts of taking one's own life today?:  0/5 - None  Self Injury:  0/5 - None  Engaged in Self Injury since yesterday: No    Thoughts of hurting other people today?:  0/5 - None  Compulsive Behaviors?:  0-None  Compulsive Behaviors?:  No  Are you able to follow your safety plan?: Yes      I can/will:  Journal  Substance Use: Yes      Tobacco - When:  Am  Amount:  Vape  Self Care: Yes      I am satisfied with my daily hygiene: No    Nourish:  Yes    Number of meals eaten yesterday:  2    Describe:  Normal  Sleep:  Yes    Used sleep aid?: No      Describe sleep:  Broken    Number of hours:  7  Social Interaction:  Yes    Social Interaction:  Minimal    Minimal with:  Family  Physical Activity:  No  Mindful Activity:  No  Medication:  Yes    Medication:   "Prescribed  Thought Content:  Clear  Which coping skills did you use yesterday? How did they help or not help?  What barriers did you face?:  N/a  Pt reported significant or positive event/step:  Hung picture, talked to brother/boss  Pt identified goal for the treatment day:  Work on house from move  Pt treatment plan areas addressed:  Depression, Anxiety, Mood dysregulation, Coping skills, Chronic Pain/Medical Issues and Self-compassion  Pt requested consult with:  None      Visit Diagnosis:      ICD-10-CM ICD-9-CM   1. Generalized anxiety disorder  F41.1 300.02   2. Psychogenic nonepileptic seizure  F44.5 300.11       Assessment/Observations:  Keri reported feeling surprised by anger, reported this is \"not in my usual nature\" and expressed that yesterday out of the blue, she had \"old\" anger surface from 40 years ago when interacting with her brother. She was vague in reporting on this, though called it \"mean, nasty, inappropriate\" what she said to her brother to address \"deep problems.\" LCSW inquired as to brother's response to this and pt was uncertain, sharing that she was not sure if he knew what she was asking him about, that he seemed cold, but also that she felt heard and supported. Pt's description of interaction with her brother was difficult to follow and unclear.  Plan:  Continue with PHP   Pt to F/U with treatment goals as outlined in treatment plan.  Pt to F/U with local ED/call 911 should SI/HI arises.    Rita Kiser, BARBRA          "

## 2023-08-18 NOTE — GROUP NOTE
Date: 8/18/2023  Start Time:  1:20 PM  End Time:   2:20 PM  Keri Hilton, YOB: 1950,  was an active group participant.    Wrap Up Group  7 attended group today.   Group Focus: Goal of group was to wrap up and process the treatment day.  Assist  members in planning for the evening ahead and to assess and plan surrounding any  safety needs.      About the Group:  Clinician reviewed group members Afternoon  Reflection Sheets and did a verbal check in with each group member regarding safety  (SI/HI/self harm) and any necessary safety planning .  Session ended with a brief  meditation/calming activity.  Brook Escobedo MD was onsite when  services rendered.        Afternoon Reflection:   Depression:  1/5  Anxiety:  1/5  Anger:  3/5  Thoughts of taking one's own life today?:  0/5 - None  Self Injury:  0/5 - None  Engaged in self-injury?  No  Thoughts of hurting other people today?:  0/5 - None  Compulsive Behaviors?:  0-None  Compulsive Behaviors?: No  Are you able to follow our safety plan?: Yes      I can/will:  Call someone and Listen to music  The most significant moment of the day or insight for me was...:  Telling group about PENS  Three things I am grateful for today are:  Brother, , opportunities in group  Did you meet your goal for today? If not, what might you do tomorrow or this week to achieve it?:  Yes- settled in  My evening self-care plan is:  Shower, wash hair      Pt treatment plan areas addressed: Anxiety, Chronic Pain/Medical Issues and Self-compassion    Visit Diagnosis:      ICD-10-CM ICD-9-CM   1. Generalized anxiety disorder  F41.1 300.02   2. Psychogenic nonepileptic seizure  F44.5 300.11       Assessment/Observations:  Keri checked out sharing she had increased comfort at the end of the day after sharing about her PNES.   Plan: Continue with PHP   Pt to F/U with treatment goals as outlined in treatment plan.  Pt to F/U with local ED/call 911 should SI/HI arises.    Evelia  Karel Crowell, LPC

## 2023-08-18 NOTE — GROUP NOTE
Date:  8/18/2023  Start Time:  10:40 AM  End Time:  11:40 AM  Keri Hilton, YOB: 1950  6 members attended group today.    Group Focus:  Goal of group was to establish and build social support, review coping skills, normalize the struggles of being human, and increase self awareness and self compassion.    About the Group:  Clinician started group with a prompting quote/song to facilitate group discussion.  Group engaged in active and supportive discussion. Topics discussed included loneliness, trust, weekend planning.  Group members were able to offer insightful and supportive feedback and suggestions about how to manage difficult situations.  Group ended with a meditation/song.  Brook Escobedo MD was onsite when services were rendered.        Patient  was quiet but attentive.  Assessment/observation of group participation/presentation: Keri was generally observant in group but shared that she does not understand how to assess if someone is trustworthy or not.  Treatment plan areas addressed: Depression, Anxiety and Mood dysregulation  Visit Diagnosis:      ICD-10-CM ICD-9-CM   1. Generalized anxiety disorder  F41.1 300.02   2. 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  Pt to F/U with local ED/call 911 should SI/HI arises.    Rita Kiser LCSW

## 2023-08-18 NOTE — PROGRESS NOTES
"INITIAL PSYCHIATRY NURSING ASSESSMENT    Keri is a 72 y/o referred to PHP for anxiety and depression.    SI-denies  HI-denies  SIB-denies  A/V Delcid-denies  SA- denies  ORALIA-denies  ADL's- showering daily, caring for self.    PHP notification letter yes    Covid Vacc status reports being vaccinated  Recent exposure denies        History Reviewed: Yes, no changes.    Outpatient Encounter Medications as of 8/18/2023   Medication Sig   • ARIPiprazole (ABILIFY) 2 mg tablet Take 1 tablet (2 mg total) by mouth daily for 15 days.   • aspirin 81 mg enteric coated tablet Take 81 mg by mouth daily.   • atorvastatin (LIPITOR) 80 mg tablet Take 80 mg by mouth daily.    • busPIRone (BUSPAR) 10 mg tablet Take 2 tablets (20 mg total) by mouth 3 (three) times a day.   • gabapentin (NEURONTIN) 600 mg tablet TAKE 1 TABLET BY MOUTH TWICE A DAY   • lisinopriL (PRINIVIL) 5 mg tablet Take 2.5 mg by mouth once daily.       Review of Systems   Constitutional: Negative.    HENT: Negative.    Eyes: Negative.    Respiratory: Negative.    Cardiovascular: Negative.    Gastrointestinal: Negative.    Endocrine: Negative.    Genitourinary: Negative.    Musculoskeletal: Positive for back pain.        Bilateral hand pain at night.     Skin: Negative.    Allergic/Immunologic: Negative.    Neurological: Negative.    Hematological: Negative.    Psychiatric/Behavioral: Positive for dysphoric mood and sleep disturbance. The patient is nervous/anxious.        Vitals:    08/18/23 0910   BP: 121/76   BP Location: Left upper arm   Patient Position: Sitting   Pulse: 64   Resp: 16   SpO2: 94%   Weight: 76.3 kg (168 lb 3.2 oz)   Height: 1.651 m (5' 5\")   PainSc: 0-No pain       Physical Exam    Labs Reviewed  abonormal labs are being managed by PCP    Does the patient worry about falling?  no  Has the patient fallen in the last 3 months?  no    Screenings done this visit:                    Metabolic Monitoring Log Completed/Updated:  Yes    Assessment/Plan: " Follow-up with PHP as scheduled     Time spent with patient:  15 minutes  Traci Schroeder RN @ 9:19 AM

## 2023-08-21 ENCOUNTER — TELEPHONE (OUTPATIENT)
Dept: PSYCHIATRY | Facility: HOSPITAL | Age: 73
End: 2023-08-21
Payer: COMMERCIAL

## 2023-08-21 ENCOUNTER — PHP (OUTPATIENT)
Dept: PSYCHIATRY | Facility: HOSPITAL | Age: 73
End: 2023-08-21
Attending: NURSE PRACTITIONER
Payer: COMMERCIAL

## 2023-08-21 DIAGNOSIS — F41.1 GENERALIZED ANXIETY DISORDER: Primary | ICD-10-CM

## 2023-08-21 DIAGNOSIS — F44.5 PSYCHOGENIC NONEPILEPTIC SEIZURE: ICD-10-CM

## 2023-08-21 PROCEDURE — H0035 MH PARTIAL HOSP TX UNDER 24H: HCPCS

## 2023-08-21 NOTE — GROUP NOTE
"Date: 8/21/2023  Start Time:   9:30 AM  End Time: 10:30 AM    Keri Hilton, YOB: 1950,  was an active group participant.    Community Check In Group  8 attended group today.     Group Focus: Goal of group was to welcome new and returning PHP members to the [facility], check in regarding group member needs, and assess safety.    About the Group: Clinician reviewed [facility] Group Code of Conduct and answered any questions that arose.  Clinician welcomed new members to the group and facilitated brief introductions of group members to one another.  Introduced topic/theme for the treatment day:Mindfulness.  Encouraged group members to request support throughout the day as needed.  Clinician reviewed group members’ Morning Reflection Sheets and did a verbal check in with each group member regarding safety (SI/HI/self harm), safety planning, medication compliance, if they needed to consult with anyone today and individual treatment needs/goals for the day.  Session ended with a brief meditation/calming activity.   Brook Escobedo MD was on site when services rendered.      Morning Reflection:   Depression:  4/5  Anxiety:  1/5  Anger:  2/5  Thoughts of taking one's own life today?:  0/5 - None  Self Injury:  0/5 - None  Engaged in Self Injury since yesterday: No    Thoughts of hurting other people today?:  0/5 - None  Compulsive Behaviors?:  0-None  Compulsive Behaviors?:  No  Are you able to follow your safety plan?: Yes      I can/will:  Call someone  Substance Use: Yes      Tobacco - When:  Vaping varies  Amount:  \"normal\"  Self Care: Yes      I am satisfied with my daily hygiene: Yes    Nourish:  Yes    Number of meals eaten yesterday:  2    Describe:  Normal  Sleep:  Yes    Used sleep aid?: No    Social Interaction:  Yes  Physical Activity:  No  Mindful Activity:  No  Medication:  Yes    Medication:  Prescribed  Thought Content:  Cloudy and Irritable  Which coping skills did you use yesterday? How did they help " "or not help?  What barriers did you face?:  Worked/talked to brother  Pt reported significant or positive event/step:  Groceries  Pt identified goal for the treatment day:  Trust self  Pt treatment plan areas addressed:  Depression, Anxiety, Relationship issues/Communication Skills, Coping skills and Mindfulness  Pt requested consult with:  None      Visit Diagnosis:      ICD-10-CM ICD-9-CM   1. Generalized anxiety disorder  F41.1 300.02   2. Psychogenic nonepileptic seizure  F44.5 300.11       Assessment/Observations:  Keri checked into group and shared about ongoing frustration with her medical condition and feeling \"tired for no reason\", later admitting it may be her medications.   Plan:  Continue with PHP   Pt to F/U with treatment goals as outlined in treatment plan.  Pt to F/U with local ED/call 911 should SI/HI arises.    Evelia Crowell, LPC          "

## 2023-08-21 NOTE — GROUP NOTE
Date:  8/21/2023  Start Time:  10:40 AM  End Time:  11:40 AM  Keri Hilton, YOB: 1950   8 members attended group today.    Group Focus:  Goal of group was to establish and build social support, review coping skills, normalize the struggles of being human, and increase self awareness and self compassion.    About the Group:  Clinician started group with a prompting quote/song to facilitate group discussion.  Group engaged in active and supportive discussion. Topics discussed included Boundaries, Mind Body connection, Adjustment to life changes, Work Life Balance, Treatment Modality's and levels of care, urge surfing, family systems.  Group members were able to offer insightful and supportive feedback and suggestions about how to manage difficult situations.  Group ended with a meditation/song.  Brook Escobedo MD was onsite when services were rendered.        Patient  was quiet but attentive.  Assessment/observation of group participation/presentation: Keri was quiet but attentive towards sharing peers in group.   Treatment plan areas addressed: Depression, Anxiety, Self Care and Mindfulness  Visit Diagnosis:      ICD-10-CM ICD-9-CM   1. Generalized anxiety disorder  F41.1 300.02   2. 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  Pt to F/U with local ED/call 911 should SI/HI arises.    Evelia Crowell, LISANDRA

## 2023-08-21 NOTE — GROUP NOTE
"Date: 8/21/2023  Start Time:  1:20 PM  End Time:   2:20 PM  Keri Hilton, YOB: 1950,  was an active group participant.    Wrap Up Group  9 attended group today.   Group Focus: Goal of group was to wrap up and process the treatment day.  Assist  members in planning for the evening ahead and to assess and plan surrounding any  safety needs.      About the Group:  Clinician reviewed group members Afternoon  Reflection Sheets and did a verbal check in with each group member regarding safety  (SI/HI/self harm) and any necessary safety planning .  Session ended with a brief  meditation/calming activity.  Brook Escobedo MD was onsite when  services rendered.        Afternoon Reflection:   Depression:  1/5  Anxiety:  0/5  Anger:  4/5  Thoughts of taking one's own life today?:  0/5 - None  Self Injury:  0/5 - None  Engaged in self-injury?  No  Thoughts of hurting other people today?:  0/5 - None  Compulsive Behaviors?:  0-None  Compulsive Behaviors?: No  Are you able to follow our safety plan?: Yes      I can/will:  Journal  The most significant moment of the day or insight for me was...:  Taking small bites of being mindful  Three things I am grateful for today are:  Children, grandchildren,   Did you meet your goal for today? If not, what might you do tomorrow or this week to achieve it?:  Be more patient and mindful, focus on something positive  My evening self-care plan is:  Shower, shave legs, read      Pt treatment plan areas addressed: Depression, Anxiety, Impulse Control, Mood dysregulation, Coping Skills and Mindfulness    Visit Diagnosis:      ICD-10-CM ICD-9-CM   1. Generalized anxiety disorder  F41.1 300.02   2. Psychogenic nonepileptic seizure  F44.5 300.11       Assessment/Observations:  Katies affect was variable in group - expressed plans to start journaling tonight, stated she feels lost about aftercare, and expressed gratitude to peer about helping get through her \"thick head\" " about mindfulness. She joked about shaving her legs tonight, that this will cause her to lose 10 lbs.  Plan: Continue with PHP   Pt to F/U with treatment goals as outlined in treatment plan.  Pt to F/U with local ED/call 911 should SI/HI arises.    Rita Kiser LCSW

## 2023-08-21 NOTE — TELEPHONE ENCOUNTER
LPC completed CC with . He shared about increase in symptoms and feeling as though her behavior has erratically changed. He feels as though the relationship with her children has impacted her symptoms, loss of mother and relationship with ex . He noted that the symptoms appeared to begin after visiting the one son where he had fears about their money. He reports that they made changes when she came back due to her fear but he still feels as though she has been fearful and angry. He reported feeling angry as well when talking about the dynamics. He noted that he has seen slight improvements since the med change last week. LISANDRA will continue to update him. He has the contact information if anything is needed or there are any concerns.

## 2023-08-21 NOTE — GROUP NOTE
Date:  8/21/2023  Start Time:  12:10 PM  End Time:   1:10 PM  Keri Hilton, YOB: 1950  Psychoeducational/Skills Based Group  9 members attended group today    Group Focus: Goal of group was to introduce skills and psychoeducation related to topic of PHP day.   Group members were provided instruction and opportunities to practice presented skills.  Clinician answered questions as they arose and shared how presented skills can be utilized on a daily basis to increase emotional wellness.      About the Group: Mindfulness Topic of curriculum was Mindfulness.” Clinician further introduced the concept of mindfulness by showing group members a Uche Abraham video. Clinician educated group members on the many different ways to use mindfulness day to day, moment to moment by sharing a Skills Overview worksheets. Concepts described on the worksheets included: 1) observe, 2) describe, 3) participate, 4) non-judgmental stance, 5) one-mindfully, 6) effectively, 7) wise mind (including a video description). The goal of sharing the skills was to provide multiple techniques for practicing mindfulness in our lives. Clinician then encouraged group members to complete a Wise Mind worksheet to relate their own experience to the concept of Wise Mind. Clinician facilitated a group discussion to check for understanding of presented skills. Clinician ended group by leading group members through a “5 Senses” exercises to help group members link practice of Observe, Describe and One-Mindfully skills.    Brook Escobedo MD was onsite when services were rendered.          Patient  was an active group participant.  Assessment/observation of group participation/presentation: Keri was engaged throughout the group. She shared having difficulty with mindfulness and wanting to work on it. She was encouraged to set a goal for this evening.   Treatment plan areas addressed: Coping Skills and Mindfulness  Visit Diagnosis:      ICD-10-CM  ICD-9-CM   1. Generalized anxiety disorder  F41.1 300.02   2. 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  Pt to F/U with local ED/call 911 should SI/HI arises.    Britta Menon LPC

## 2023-08-22 ENCOUNTER — TELEPHONE (OUTPATIENT)
Dept: PSYCHIATRY | Facility: HOSPITAL | Age: 73
End: 2023-08-22
Payer: COMMERCIAL

## 2023-08-22 ENCOUNTER — PHP (OUTPATIENT)
Dept: PSYCHIATRY | Facility: HOSPITAL | Age: 73
End: 2023-08-22
Attending: NURSE PRACTITIONER
Payer: COMMERCIAL

## 2023-08-22 DIAGNOSIS — F44.5 PSYCHOGENIC NONEPILEPTIC SEIZURE: ICD-10-CM

## 2023-08-22 DIAGNOSIS — F41.1 GENERALIZED ANXIETY DISORDER: Primary | ICD-10-CM

## 2023-08-22 PROCEDURE — H0035 MH PARTIAL HOSP TX UNDER 24H: HCPCS

## 2023-08-22 NOTE — GROUP NOTE
Date: 8/22/2023  Start Time:  1:20 PM  End Time:   2:20 PM  Keri Hilton, YOB: 1950,  was an active group participant.    Wrap Up Group  8 attended group today.   Group Focus: Goal of group was to wrap up and process the treatment day.  Assist  members in planning for the evening ahead and to assess and plan surrounding any  safety needs.      About the Group:  Clinician reviewed group members Afternoon  Reflection Sheets and did a verbal check in with each group member regarding safety  (SI/HI/self harm) and any necessary safety planning .  Session ended with a brief  meditation/calming activity.  Brook Escobedo MD was onsite when  services rendered.        Afternoon Reflection:   Depression:  3/5  Anxiety:  1/5  Anger:  3/5  Thoughts of taking one's own life today?:  0/5 - None  Self Injury:  0/5 - None  Engaged in self-injury?  No  Thoughts of hurting other people today?:  0/5 - None  Compulsive Behaviors?:  0-None  Compulsive Behaviors?: No  Are you able to follow our safety plan?: Yes      I can/will:  Journal and Listen to music  The most significant moment of the day or insight for me was...:  Maybe I wasn;t a bad mother  Three things I am grateful for today are:  , group today, weather  Did you meet your goal for today? If not, what might you do tomorrow or this week to achieve it?:  Was more involved in group  My evening self-care plan is:  Hang pictures, do wash      Pt treatment plan areas addressed: Anxiety, Chronic Pain/Medical Issues and Emotion Regulation    Visit Diagnosis:      ICD-10-CM ICD-9-CM   1. Generalized anxiety disorder  F41.1 300.02   2. Psychogenic nonepileptic seizure  F44.5 300.11       Assessment/Observations:  Keri checked out with well wishes for a graduating peer and some reframed thoughts about her capacity to mother.   Plan: Continue with PHP   Pt to F/U with treatment goals as outlined in treatment plan.  Pt to F/U with local ED/call 911 should SI/HI  arises.    Evelia Crowell, LPC

## 2023-08-22 NOTE — GROUP NOTE
Date:  8/22/2023  Start Time:  12:10 PM  End Time:   1:10 PM  Keri Hilton, YOB: 1950  Psychoeducational/Skills Based Group  8 members attended group today    Group Focus: Goal of group was to introduce skills and psychoeducation related to topic of PHP day.   Group members were provided instruction and opportunities to practice presented skills.  Clinician answered questions as they arose and shared how presented skills can be utilized on a daily basis to increase emotional wellness.      About the Group: Emotion Regulation Session 2: Emotion Regulation Psycho-Ed/Skills Based Group Summary   Topic of curriculum was “Emotion Regulation”. Clinician began group discussion by educating group members of the importance of having coping skills available to use at any time whether for maintaining emotional wellness of responding to a stimulating event. Clinician shared that the goal of the group is to provide coping skills to group members for ongoing emotional wellness as well as crisis intervention when intense emotions are triggered/activated. Clinician provided the first set of proactive skills to reduce emotional vulnerability which included “P.L.E.A.S.E.” and “Paying Attention to Positive Events”. The next set of responsive coping skills, used  when a surge of emotion comes, negative event happens, etc, included 1) naming the emotion, 2) opposite action, 3) check the facts, 4) feeling not acting, and 5) ride the wave. Clinician provided group members with worksheets to reinforce the skills. While completing the P.L.E.A.S.E worksheet, Clinician also discussed hormonal vulnerability as women and barriers to healthy sleep and provided resources on bedtime routine/healthy sleep hygiene. Clinician continued the topic discussion and checked for understanding of presented skills. Clinician ended the group with a sleep meditation led by Aramis Arevalo.     Brook Escobedo MD was onsite when services were  rendered.          Patient  was an active group participant.  Assessment/observation of group participation/presentation: Keri was engaged throughout the group. She shared about invalidating statements she had heard as well as how journaling helps her ride the wave.   Treatment plan areas addressed: Coping Skills, Mindfulness and Emotion Regulation  Visit Diagnosis:      ICD-10-CM ICD-9-CM   1. Generalized anxiety disorder  F41.1 300.02   2. 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  Pt to F/U with local ED/call 911 should SI/HI arises.    Britta Menon LPC

## 2023-08-22 NOTE — GROUP NOTE
"Date:  8/22/2023  Start Time:  10:40 AM  End Time:  11:40 AM  Keri Hilton, YOB: 1950  7 members attended group today.    Group Focus:  Goal of group was to establish and build social support, review coping skills, normalize the struggles of being human, and increase self awareness and self compassion.    About the Group:  Clinician started group with a prompting quote/song to facilitate group discussion.  Group engaged in active and supportive discussion. Topics discussed included radical acceptance, identifying needs, asking for help.  Group members were able to offer insightful and supportive feedback and suggestions about how to manage difficult situations.  Group ended with a meditation/song.  Brook Escobedo MD was onsite when services were rendered.        Patient  was an active group participant.  Assessment/observation of group participation/presentation: Keri shared about relationship with brother, expressing that she wants a certain kind of validation from him but she was hard to follow, seeming to contradict herself at times. She was tearful in relating to a peer and expressing regret for being a \"party mom\" when she was raising her children and feeling that she is not as involved as a grandmother as she would like to be. Peers offered encouragement and LCSW encouraged pt to consider action steps she may like to take around showing up in a different way that is within her control in these relationships.  Treatment plan areas addressed: Depression, Anxiety and Relationship issues/Communication skills  Visit Diagnosis:      ICD-10-CM ICD-9-CM   1. Generalized anxiety disorder  F41.1 300.02   2. 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  Pt to F/U with local ED/call 911 should SI/HI arises.    Rita Kiser LCSW      "

## 2023-08-23 ENCOUNTER — PHP (OUTPATIENT)
Dept: PSYCHIATRY | Facility: HOSPITAL | Age: 73
End: 2023-08-23
Attending: NURSE PRACTITIONER
Payer: COMMERCIAL

## 2023-08-23 DIAGNOSIS — F41.1 GENERALIZED ANXIETY DISORDER: Primary | ICD-10-CM

## 2023-08-23 DIAGNOSIS — F44.5 PSYCHOGENIC NONEPILEPTIC SEIZURE: ICD-10-CM

## 2023-08-23 PROCEDURE — 99213 OFFICE O/P EST LOW 20 MIN: CPT | Performed by: NURSE PRACTITIONER

## 2023-08-23 PROCEDURE — H0035 MH PARTIAL HOSP TX UNDER 24H: HCPCS | Performed by: COUNSELOR

## 2023-08-23 RX ORDER — BUSPIRONE HYDROCHLORIDE 10 MG/1
20 TABLET ORAL 3 TIMES DAILY
Qty: 540 TABLET | Refills: 0 | Status: SHIPPED | OUTPATIENT
Start: 2023-08-23 | End: 2023-11-06 | Stop reason: SDUPTHER

## 2023-08-23 RX ORDER — ARIPIPRAZOLE 2 MG/1
2 TABLET ORAL DAILY
Qty: 30 TABLET | Refills: 0 | Status: SHIPPED | OUTPATIENT
Start: 2023-08-23 | End: 2023-08-30 | Stop reason: SDUPTHER

## 2023-08-23 ASSESSMENT — ENCOUNTER SYMPTOMS
HEADACHES: 0
TREMORS: 0
GASTROINTESTINAL NEGATIVE: 1
DIZZINESS: 1
FATIGUE: 0

## 2023-08-23 NOTE — PROGRESS NOTES
PHP PSYCHIATRY FOLLOW UP/MEDICATION CHECK    Keri Hilton is a 73 y.o. female who presents for Follow-up and Med Management.    HPI  Notes she is feeling better since starting Abilify.  Had some headaches when she first started, now resolved.  Mood still low, with low motivation and poor concentration.  Feels she struggles with short term memory.  Interested in adding an additional antidepressant.  Ongoing anxiety with ruminating worries.  Denies intrusive thoughts.  Denies SI/HI.    Felt she needed time to adjust to the groups, now feeling connected.  Feels she has been using some skills discussed by other group members.        Psychiatric ROS:   Sleep:Fair, disrupted by hand pain  Appetite: Fair, still low but some improvement  Exercise: limited  ETOH/Substances: 1 cup of coffee in the morning.  SI/HI: Denies SI/HI, future oriented, feels safe at home    Medical Review of Systems  Review of Systems   Constitutional: Negative for fatigue.   Gastrointestinal: Negative.    Musculoskeletal:        Bilateral wrist pain   Neurological: Positive for dizziness (after taking gabapentin). Negative for tremors and headaches.   Feels her gait has been more even since risperidone d/c    PMSH:   -Increasing wrist pain- may have her gabapentin increased    Risk/Benefit/side Effects discussed regarding the following medications:  Abilify  PDMP Queried: Yes    Allergies:   Allergies   Allergen Reactions   • Shellfish Containing Products Nausea And Vomiting   • Shellfish Derived        Current Outpatient Medications:   •  busPIRone (BUSPAR) 10 mg tablet, Take 2 tablets (20 mg total) by mouth 3 (three) times a day., , Disp: 540 tablet, Rfl: 0  •  ARIPiprazole (ABILIFY) 2 mg tablet, Take 1 tablet (2 mg total) by mouth daily for 15 days., , Disp: 15 tablet, Rfl: 0  •  aspirin 81 mg enteric coated tablet, Take 81 mg by mouth daily., , Disp: , Rfl:   •  atorvastatin (LIPITOR) 80 mg tablet, Take 80 mg by mouth daily. , , Disp: , Rfl:   •  " gabapentin (NEURONTIN) 600 mg tablet, TAKE 1 TABLET BY MOUTH TWICE A DAY, , Disp: 180 tablet, Rfl: 1  •  lisinopriL (PRINIVIL) 5 mg tablet, Take 2.5 mg by mouth once daily., , Disp: , Rfl:        Objective     Physical Exam  Constitutional:       Appearance: Normal appearance.   Neurological:      Mental Status: She is alert.      Motor: No tremor.      Coordination: Coordination is intact.      Gait: Gait is intact.         There were no vitals taken for this visit.    Mental Status Exam  Appearance: well groomed and appropriate attire  Gait and Motor: no abnormal movements  Speech: normal rate/rhythm/volume  Mood: depressed, anxious and better  Affect: full range  Associations: coherent  Thought Process: goal-directed and flight of ideas  Thought Content: no auditory or visual hallucinations. and appropriate to situation  Suicidality/Homicidality: denies  Judgement/Insight: acknowledges illness and fair insight   Orientation: intact to interview  Attention: alert  Language: normal         [place] Suicide Severity Rating Scale:    C-SSRS Short Version Recent  1. Within the past month, have you wished you were dead or wished you could go to sleep and not wake up?: Yes (8/17/2023  8:52 AM)  2. Within the past month, have you actually had any thoughts of killing yourself?: No (8/17/2023  8:52 AM)  6. Have you ever done anything, started to do anything, or prepared to do anything to end your life?: (S) Yes (21 y/o, 31 y/o cut and again in earlt 50s. All SA, was not hospitalized after first one, second two were hospitalized. 89, needed medical care.) (8/17/2023  8:52 AM)  If yes, was this within the past 3 months?: No (8/17/2023  8:52 AM)      Safe-T Assessment:    Safe-T Assessment  Suicidal Behavior: (S) History of prior suicide attempts; Self-injurious behavior (31 y/o cutting, 21 y/o- dx with BPAD took all medicine one night, uncertain name and early 50s was pills- \"every pill that I had, prozac, tegratol, I " "don't know what else I had.\") (8/17/2023  8:54 AM)  Current/Past Psychiatric Disorders: Mood disorders; Recent onset of illness; ETOH/Substance abuse (\"PNES- only have them at home but have had bad one's lately.\" Dx BPAD at 20. PNES dx two year ago) (8/17/2023  8:54 AM)  Key symptoms: Anhedonia; Anxiety/panic (8/17/2023  8:54 AM)  Family History Risk Factors: Axis 1 psychiatric disorders requiring hospitalization (Maternal grandmother in early 40s needed ECT. Not sure of full story. Cousin who commited suicide) (8/17/2023  8:54 AM)  Precipitants/Stressors/Interpersonal/Triggers: Intoxication (8/17/2023  8:54 AM)  Access to fire arms.: No (8/17/2023  8:54 AM)  Internal factors: Identifies reasons for living (8/17/2023  8:54 AM)  External Factors: Responsibility to children; Positive therapeutic relationships; Supportive social network of family or friends; Spiritual and/or Moral attitues against suicide (8/17/2023  8:54 AM)   In the last month, how many times have you had suicidal thoughts?: 2-5 times in week (gained hope when discussing PHP with therapist on Monday) (8/17/2023  8:54 AM)  In the last month, when you have had suicidal thoughts, how long do they last?: Fleeting-few seconds or minutes (8/17/2023  8:54 AM)  In the last month, could/can you stop thinking about killing yourself or wanting to die if you want to?: Easily able to control thoughts (8/17/2023  8:54 AM)  In the last month, are there things - anyone or anything (i.e. family, [place], pain of death) - that stopped you from wanting to die or acting on thoughts of suicide? : Deterrents definitely stopped you from attempting suicide (8/17/2023  8:54 AM)  In the last month, what sorts of reasons did you have for thinking about wanting to die or killing yourself?: Completely to end or stop the pain (you couldn’t go on living with the pain or how you were feeling) (8/17/2023  8:54 AM)  Safe T Assessment of Risk: : Low Suicide Risk (8/17/2023  8:54 " AM)  Interventions: Develop Safety Plan; Symptom reduction.; Provide Emergency/Crisis numbers (8/17/2023  8:54 AM)        Labs  8/2023  BMP- sodium slightly elevated 145  WBC- WNL     5/2023  Lipid panel- triglycerides elevated 161  CMP- glucose 100     8/2022  TSH WNL                   ECG   None in Epic       Brief Psychiatric Formulation: Keri is a 72 yr old with a history of BPAD and anxiety and PMH of HTN, MI, nonepileptiform seizures admitted to [facility] for worsening depression and anxiety.  She has a history of four past psychiatric hospitalizations and three suicide attempts.  Precipitating factors include anxiety around her nonepileptiform seizures, recent move, and recent medication changes.  Strengths include connection with OP providers and connection with family.  Barriers include poor concentration, difficulties identifying precipitating factors for worsening anxiety, and need to improve anxiety coping skills.     At PHP intake, risperidone discontinued and Abilify started to target depression, which she has overall tolerated and seeing benefit.  She notes ongoing symptoms of depression and anxiety- encouraged to monitor response to Abilify as this was just started last week prior to adding in an additional medication.       Pt is at chronic elevated risk of intentional harm to self given hx of depression, anxiety, and suicide attempts. Her acute risk is elevated by ongoing depression/anxiety sx, and passive SI though this acute risk is mitigated by lack of current active suicidal ideation, lack of plan/intent, commitment to family, treatment-seeking behavior, and future orientation. In sum, her acute risk of intentional harm to self is not significantly elevated from usual baseline as to warrant hospitalization, but given her degree of symptoms and impact on functioning, she is appropriate for PHP. She denies any thoughts of harming others and has no history of violence, so risk to others is low.         Based on [place] Suicide Screen and current clinical assessment, patient is determined Low Risk.  Suicide Risk/Suicidal Ideation will be added to patient's treatment plan.     Plan:   • Continue PHP  • Continue Abilify 2 mg daily for depression and mood stability  • Continue buspirone 20 mg TID for anxiety  • Follow up with PCP and neurology as indicated  • Aftercare plans: OP therapy and psychiatry; IOP vs OP groups dependent on insurance coverage  • Patient to F/U with treatment goals as outlined in treatment plan.  • Patient to F/U with local ED or call 911 should SI/HI arise.    Recertification: I certify that PHP level of care continues to be required, improvement is expected and without this medically necessary treatment, inpatient psychiatric care would be required.  Keri Hilton's response to therapeutic intervention has shown gradual improvement. Keri Hilton remains at risk for psychiatric hospitalization due to moderate to severe mood symptoms. Treatment goals and coordination of care with multidisciplinary team as outlined in updated treatment plan    Visit Diagnosis:    ICD-10-CM ICD-9-CM   1. Generalized anxiety disorder  F41.1 300.02   2. Psychogenic nonepileptic seizure  F44.5 300.11         TJ Summres @ 3:35 PM

## 2023-08-23 NOTE — GROUP NOTE
Date:  8/23/2023  Start Time:  12:10 PM  End Time:   1:10 PM  Keri Hilton, YOB: 1950  Psychoeducational/Skills Based Group  8 members attended group today    Group Focus: Goal of group was to introduce skills and psychoeducation related to topic of PHP day.   Group members were provided instruction and opportunities to practice presented skills.  Clinician answered questions as they arose and shared how presented skills can be utilized on a daily basis to increase emotional wellness.      About the Group: Trauma (day 1) Session 3: Trauma (Day 1) Psycho-Ed/Skills Based Group Summary  Topic of curriculum was “Trauma”. Clinician began group by briefly reviewing description of trauma from group 1. Clinician provided psychoeducation about the body’s response to trauma using a Fight or Flight handout. Clinician introduced two techniques used in the context of trauma to help a person to stay in the present moment and cope with triggers. The first technique is called “Grounding” and was described using a worksheet from [employer]. The second is a technique called “EFT tapping” and was illustrated using a YouTube video and paired with a worksheet on tapping points. Clinician then led group members through an open discussion on their experience with the two techniques and usefulness in managing triggers. Clinician ended group with a grounding meditation from [email or web address].    Brook Escobedo MD was onsite when services were rendered.          Patient  was an active group participant.  Assessment/observation of group participation/presentation: Keri engaged in skills and related to the trauma responses, sharing appropriately about how past experiences can trigger fears around men.  Treatment plan areas addressed: Depression, Anxiety, Trauma and Coping Skills  Visit Diagnosis:      ICD-10-CM ICD-9-CM   1. Generalized anxiety disorder  F41.1 300.02   2. Psychogenic nonepileptic seizure  F44.5 300.11        Plan: Continue with PHP   Pt to F/U with treatment goals as outlined in treatment plan.  Pt to F/U with local ED/call 911 should SI/HI arises.    Rita Kiser LCSW

## 2023-08-23 NOTE — GROUP NOTE
Date: 8/23/2023  Start Time:  1:20 PM  End Time:   2:20 PM  Keri Hilton, YOB: 1950,  was an active group participant.    Wrap Up Group  8 attended group today.   Group Focus: Goal of group was to wrap up and process the treatment day.  Assist  members in planning for the evening ahead and to assess and plan surrounding any  safety needs.      About the Group:  Clinician reviewed group members Afternoon  Reflection Sheets and did a verbal check in with each group member regarding safety  (SI/HI/self harm) and any necessary safety planning .  Session ended with a brief  meditation/calming activity.  Brook Escobedo MD was onsite when  services rendered.        Afternoon Reflection:   Depression:  1/5  Anxiety:  1/5  Anger:  3/5  Thoughts of taking one's own life today?:  0/5 - None  Self Injury:  0/5 - None  Engaged in self-injury?  No  Thoughts of hurting other people today?:  0/5 - None  Compulsive Behaviors?:  0-None  Compulsive Behaviors?: No  Are you able to follow our safety plan?: Yes      I can/will:  Journal, Listen to music and Use grounding with my 5 senses  The most significant moment of the day or insight for me was...:  Grounding  Three things I am grateful for today are:  Weather, my , my sons and granddaughters  Did you meet your goal for today? If not, what might you do tomorrow or this week to achieve it?:  Still struggling w anger  My evening self-care plan is:  Work on house - some peace this evening      Pt treatment plan areas addressed: Depression, Anxiety, Self Care, Trauma and Coping Skills    Visit Diagnosis:      ICD-10-CM ICD-9-CM   1. Generalized anxiety disorder  F41.1 300.02   2. Psychogenic nonepileptic seizure  F44.5 300.11       Assessment/Observations:  Keri offered feedback to a peer on self-care and encouraged a peer on discharge day.  Plan: Continue with PHP   Pt to F/U with treatment goals as outlined in treatment plan.  Pt to F/U with local ED/call 911  should SI/HI arises.    CAITLIN LoyolaW

## 2023-08-23 NOTE — GROUP NOTE
Date:  8/23/2023  Start Time:  10:40 AM  End Time:  11:40 AM  Keri Hilton, YOB: 1950  8 members attended group today.    Group Focus:  Goal of group was to establish and build social support, review coping skills, normalize the struggles of being human, and increase self awareness and self compassion.    About the Group:  Clinician started group with a prompting quote/song to facilitate group discussion.  Group engaged in active and supportive discussion. Topics discussed included behavioral activation, [place] of control, parentification, radical acceptance.  Group members were able to offer insightful and supportive feedback and suggestions about how to manage difficult situations.  Group ended with a meditation/song.  Brook Escobedo MD was onsite when services were rendered.        Patient  was an active group participant.  Assessment/observation of group participation/presentation: Keri was engaged throughout group. She provided empathetic support and feedback to her peer. She was engaged non verbally throughout the whole group.   Treatment plan areas addressed: Depression, Anxiety, Relationship issues/Communication skills and Phase of life problems  Visit Diagnosis:      ICD-10-CM ICD-9-CM   1. Generalized anxiety disorder  F41.1 300.02   2. 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  Pt to F/U with local ED/call 911 should SI/HI arises.    Britta Menon LPC

## 2023-08-23 NOTE — GROUP NOTE
Date: 8/23/2023  Start Time:   9:30 AM  End Time: 10:30 AM    Keri Hilton, YOB: 1950,  was an active group participant.    Community Check In Group  7 attended group today.     Group Focus: Goal of group was to welcome new and returning PHP members to the [facility]), check in regarding group member needs, and assess safety.    About the Group: Clinician reviewed [facility] Group Code of Conduct and answered any questions that arose.  Clinician welcomed new members to the group and facilitated brief introductions of group members to one another.  Introduced topic/theme for the treatment day:Trauma.  Encouraged group members to request support throughout the day as needed.  Clinician reviewed group members’ Morning Reflection Sheets and did a verbal check in with each group member regarding safety (SI/HI/self harm), safety planning, medication compliance, if they needed to consult with anyone today and individual treatment needs/goals for the day.  Session ended with a brief meditation/calming activity.   Brook Escobedo MD was on site when services rendered.      Morning Reflection:   Depression:  1/5  Anxiety:  1/5  Anger:  3/5  Thoughts of taking one's own life today?:  0/5 - None  Self Injury:  0/5 - None  Engaged in Self Injury since yesterday: No    Thoughts of hurting other people today?:  0/5 - None  Compulsive Behaviors?:  0-None  Compulsive Behaviors?:  No  Are you able to follow your safety plan?: Yes      I can/will:  Journal  Substance Use: Yes      Tobacco - When:  Daily  Amount:  Varies  Self Care: Yes      I am satisfied with my daily hygiene: Yes    Nourish:  Yes    Number of meals eaten yesterday:  2  Sleep:  Yes    Number of hours:  BAD HANDS  Social Interaction:  Yes    Social Interaction:  Excessive and Moderate    Excessive with:  Family    Moderate with:  Friends  Physical Activity:  Yes  Mindful Activity:  Yes    Describe Practice:  Prayed  Medication:  Yes    Medication:   "Prescribed  Thought Content:  Clear and Racing Thoughts  Which coping skills did you use yesterday? How did they help or not help?  What barriers did you face?:  Blocked thoughts, mindfulness \"worry later\"  Pt reported significant or positive event/step:  Cleared wash, and stuff for [place]  Pt identified goal for the treatment day:  Self love and let go of loathing  Pt treatment plan areas addressed:  Anxiety, Chronic Pain/Medical Issues and Anger Management  Pt requested consult with:  None      Visit Diagnosis:      ICD-10-CM ICD-9-CM   1. Generalized anxiety disorder  F41.1 300.02   2. Psychogenic nonepileptic seizure  F44.5 300.11       Assessment/Observations:  Keri checked in feeling hopeful about her meeting with psychiatry and her considering starting a medication.  She continues to use skills to push away distressing emotions and then distracting herself further until the distress passes.   Plan:  Continue with PHP   Pt to F/U with treatment goals as outlined in treatment plan.  Pt to F/U with local ED/call 911 should SI/HI arises.    Evelia Crowell, LPC          "

## 2023-08-24 ENCOUNTER — PHP (OUTPATIENT)
Dept: PSYCHIATRY | Facility: HOSPITAL | Age: 73
End: 2023-08-24
Payer: COMMERCIAL

## 2023-08-24 ENCOUNTER — PHP (OUTPATIENT)
Dept: PSYCHIATRY | Facility: HOSPITAL | Age: 73
End: 2023-08-24
Attending: NURSE PRACTITIONER
Payer: COMMERCIAL

## 2023-08-24 DIAGNOSIS — F41.1 GENERALIZED ANXIETY DISORDER: Primary | ICD-10-CM

## 2023-08-24 DIAGNOSIS — F44.5 PSYCHOGENIC NONEPILEPTIC SEIZURE: ICD-10-CM

## 2023-08-24 PROCEDURE — H0035 MH PARTIAL HOSP TX UNDER 24H: HCPCS | Performed by: COUNSELOR

## 2023-08-24 PROCEDURE — 90832 PSYTX W PT 30 MINUTES: CPT

## 2023-08-24 ASSESSMENT — COGNITIVE AND FUNCTIONAL STATUS - GENERAL
PSYCHOMOTOR FUNCTIONING: WNL
AROUSAL LEVEL: ALERT
IMPULSE CONTROL: INTACT
EYE_CONTACT: WNL
REMOTE MEMORY: WNL
ATTENTION: WNL
THOUGHT_CONTENT: APPROPRIATE
PERCEPTUAL FUNCTION: NORMAL
EST. PREMORBID INTELLIGENCE: AVERAGE
DELUSIONS: NONE OR AGE APPROPRIATE
APPEARANCE: WELL GROOMED
SPEECH: REGULAR
AFFECT: FULL RANGE
SLEEP_WAKE_CYCLE: INCREASED
ORIENTATION: FULLY ORIENTED
INSIGHT: INTACT
APPETITE: NO CHANGE
CONCENTRATION: WNL
LIBIDO: NO CHANGE
THOUGHT_PROCESS: WNL
RECENT MEMORY: MILD LOSS
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
## 2023-08-24 NOTE — GROUP NOTE
Date:  8/24/2023  Start Time:  12:10 PM  End Time:   1:10 PM  Keri Hilton, YOB: 1950  Psychoeducational/Skills Based Group  8 members attended group today    Group Focus: Goal of group was to introduce skills and psychoeducation related to topic of PHP day.   Group members were provided instruction and opportunities to practice presented skills.  Clinician answered questions as they arose and shared how presented skills can be utilized on a daily basis to increase emotional wellness.      About the Group: CBT Session 4: CBT (Cognitive Behavioral Therapy) Psycho-Ed/Skills Based Group   Topic of curriculum was “CBT (Cognitive Behavioral Therapy)”. Clinician began group by briefly reviewing description of CBT from group 1. Clinician showed a video entitled “Parable of the Two Wolves” to describe how our cognitions, both positive and negative are strengthened or intensified when we give them attention and the power of mindfulness. Clinician provided psychoeducation about “core beliefs” and “cognitive distortions” and discussed how core beliefs impact our current thoughts, feelings and behaviors. Clinician provided a core beliefs worksheet as a way of helping group members challenge the negative thoughts associated with negative core beliefs. Clinician then introduced the “ATR worksheet” as a way of describing the relationship between thoughts, feelings and behaviors (cognitive triangle) and the process of challenging and reframing thoughts. Clinician also introduced “ANTS” worksheet to further describe the process of reframing thoughts. Clinician then facilitated an open discussion on CBT concepts presented and assessed for group member understanding. Clinician ended group by showing a YouTube video of [employer]’s [employer] show and an interview with Aramis Arevalo.    Brook Escobedo MD was onsite when services were rendered.          Patient  was an active group participant.  Assessment/observation of  group participation/presentation: Keri was active and participated in group work and discussion on re framing automatic negative thoughts.   Treatment plan areas addressed: Anxiety, Chronic Pain/Medical Issues and CBT skills  Visit Diagnosis:      ICD-10-CM ICD-9-CM   1. Generalized anxiety disorder  F41.1 300.02   2. 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  Pt to F/U with local ED/call 911 should SI/HI arises.    Evelia Crowell, LPC
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
## 2023-08-24 NOTE — GROUP NOTE
Date:  8/24/2023  Start Time:  10:40 AM  End Time:  11:40 AM  Keri Hilton, YOB: 1950  7 members attended group today.    Group Focus:  Goal of group was to establish and build social support, review coping skills, normalize the struggles of being human, and increase self awareness and self compassion.    About the Group:  Clinician started group with a prompting quote/song to facilitate group discussion.  Group engaged in active and supportive discussion. Topics discussed included CBT, Core beliefs, embarrassment, guilt and shame.  Group members were able to offer insightful and supportive feedback and suggestions about how to manage difficult situations.  Group ended with a meditation/song.  Brook Escobedo MD was onsite when services were rendered.        Patient  was an active group participant.  Assessment/observation of group participation/presentation: Keri was engaged throughout group. Keri shared relating to her peers as well as often asked clarifying questions. She appeared look for direct feedback and skills to change her thinking. She did highlight also having difficulty with memory at this time.   Treatment plan areas addressed: Anxiety and CBT skills  Visit Diagnosis:      ICD-10-CM ICD-9-CM   1. Generalized anxiety disorder  F41.1 300.02   2. 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  Pt to F/U with local ED/call 911 should SI/HI arises.    Britta Menon LPC

## 2023-08-24 NOTE — GROUP NOTE
"Date: 8/24/2023  Start Time:  1:20 PM  End Time:   2:20 PM  Keri Hilton, YOB: 1950,  was an active group participant.    Wrap Up Group  8 attended group today.   Group Focus: Goal of group was to wrap up and process the treatment day.  Assist  members in planning for the evening ahead and to assess and plan surrounding any  safety needs.      About the Group:  Clinician reviewed group members Afternoon  Reflection Sheets and did a verbal check in with each group member regarding safety  (SI/HI/self harm) and any necessary safety planning .  Session ended with a brief  meditation/calming activity.  Brook Escobedo MD was onsite when  services rendered.        Afternoon Reflection:   Depression:  2/5  Anxiety:  5/5  Anger:  5/5  Thoughts of taking one's own life today?:  0/5 - None  Self Injury:  0/5 - None  Engaged in self-injury?  No  Thoughts of hurting other people today?:  0/5 - None  Compulsive Behaviors?:  0-None  Compulsive Behaviors?: No  Are you able to follow our safety plan?: Yes      I can/will:  Journal  The most significant moment of the day or insight for me was...:  Meeting w April  Three things I am grateful for today are:  , children + grandchildren, group  Did you meet your goal for today? If not, what might you do tomorrow or this week to achieve it?:  No, disc of anger + being alone  Did challenge a lot I didn't understand  My evening self-care plan is:  Must think about IOP, take things to [place] \"out of house\" yes      Pt treatment plan areas addressed: Depression, Anxiety, Self Care, Mood dysregulation, Coping Skills and CBT skills    Visit Diagnosis:      ICD-10-CM ICD-9-CM   1. Generalized anxiety disorder  F41.1 300.02   2. Psychogenic nonepileptic seizure  F44.5 300.11       Assessment/Observations:  Keri expressed anxiety about decision-making around IOP. She laughed and offered encouragement to a peer. She continues to present as somewhat vague in her " language.  Plan: Continue with PHP   Pt to F/U with treatment goals as outlined in treatment plan.  Pt to F/U with local ED/call 911 should SI/HI arises.    Rita Kiser LCSW

## 2023-08-24 NOTE — GROUP NOTE
"Date: 8/24/2023  Start Time:   9:30 AM  End Time: 10:30 AM    Keri Hilton, YOB: 1950,  was an active group participant.    Community Check In Group  8 attended group today.     Group Focus: Goal of group was to welcome new and returning PHP members to the [facility], check in regarding group member needs, and assess safety.    About the Group: Clinician reviewed [facility] Group Code of Conduct and answered any questions that arose.  Clinician welcomed new members to the group and facilitated brief introductions of group members to one another.  Introduced topic/theme for the treatment day:CBT.  Encouraged group members to request support throughout the day as needed.  Clinician reviewed group members’ Morning Reflection Sheets and did a verbal check in with each group member regarding safety (SI/HI/self harm), safety planning, medication compliance, if they needed to consult with anyone today and individual treatment needs/goals for the day.  Session ended with a brief meditation/calming activity.   Brook Escobedo MD was on site when services rendered.      Morning Reflection:   Depression:  1/5  Anxiety:  0/5  Anger:  2/5  Thoughts of taking one's own life today?:  0/5 - None  Self Injury:  0/5 - None  Engaged in Self Injury since yesterday: No    Thoughts of hurting other people today?:  0/5 - None  Compulsive Behaviors?:  0-None  Compulsive Behaviors?:  No  Are you able to follow your safety plan?: Yes      I can/will:  Journal and Use grounding with my 5 senses  Substance Use: Yes      Tobacco - When:  Vape usual  Amount:  \"  Self Care: No      I am satisfied with my daily hygiene: No    Nourish:  Yes    Number of meals eaten yesterday:  2  Sleep:  Yes    Used sleep aid?: No    Social Interaction:  Yes    Social Interaction:  Excessive and Minimal    Excessive with:  Family    Minimal with:  Friends  Physical Activity:  Yes  Mindful Activity:  No  Medication:  Yes    Medication:  Prescribed  Thought " "Content:  Cloudy  Which coping skills did you use yesterday? How did they help or not help?  What barriers did you face?:  Grounding  Pt reported significant or positive event/step:  None really nap- very tired  Pt identified goal for the treatment day:  Discuss anger or fear of being left alone  Pt treatment plan areas addressed:  Depression, Anxiety, Anger Management, Self-compassion and Coping skills  Pt requested consult with:  None      Visit Diagnosis:      ICD-10-CM ICD-9-CM   1. Generalized anxiety disorder  F41.1 300.02   2. Psychogenic nonepileptic seizure  F44.5 300.11       Assessment/Observations:  Keri was engaged throughout group. Keri reports not anxiety today although notes that she is feeling very tired. She napped yesterday due to this. She also had multiple ANTs during the morning which was noted sharing that she is \"a slug\" and \"coward.\"  Plan:  Continue with PHP   Pt to F/U with treatment goals as outlined in treatment plan.  Pt to F/U with local ED/call 911 should SI/HI arises.    Britta Menon LPC          "

## 2023-08-25 ENCOUNTER — PHP (OUTPATIENT)
Dept: PSYCHIATRY | Facility: HOSPITAL | Age: 73
End: 2023-08-25
Attending: NURSE PRACTITIONER
Payer: COMMERCIAL

## 2023-08-25 DIAGNOSIS — F41.1 GENERALIZED ANXIETY DISORDER: Primary | ICD-10-CM

## 2023-08-25 DIAGNOSIS — F44.5 PSYCHOGENIC NONEPILEPTIC SEIZURE: ICD-10-CM

## 2023-08-25 PROCEDURE — H0035 MH PARTIAL HOSP TX UNDER 24H: HCPCS

## 2023-08-25 NOTE — GROUP NOTE
"Date: 8/25/2023  Start Time:  1:20 PM  End Time:   2:20 PM  Keri Hilton, YOB: 1950,  was an active group participant.    Wrap Up Group  8 attended group today.   Group Focus: Goal of group was to wrap up and process the treatment day.  Assist  members in planning for the evening ahead and to assess and plan surrounding any  safety needs.      About the Group:  Clinician reviewed group members Afternoon  Reflection Sheets and did a verbal check in with each group member regarding safety  (SI/HI/self harm) and any necessary safety planning .  Session ended with a brief  meditation/calming activity.  Patricia Vinson MD was onsite when  services rendered.        Afternoon Reflection:   Depression:  2/5  Anxiety:  2/5  Anger:  2/5  Thoughts of taking one's own life today?:  0/5 - None  Self Injury:  0/5 - None  Engaged in self-injury?  No  Thoughts of hurting other people today?:  0/5 - None  Compulsive Behaviors?:  0-None  Compulsive Behaviors?: No  Are you able to follow our safety plan?: Yes      I can/will:  Journal  The most significant moment of the day or insight for me was...:  Once again trying back to Tuesday's packet  Three things I am grateful for today are:    Children  grandchildren  Did you meet your goal for today? If not, what might you do tomorrow or this week to achieve it?:  No- was negative- don't know why  My evening self-care plan is:  Going to go over Tuesdays packet again- seems to resonate with me      Pt treatment plan areas addressed: Coping Skills and Building strengths/resilience    Visit Diagnosis:      ICD-10-CM ICD-9-CM   1. Generalized anxiety disorder  F41.1 300.02   2. 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 Keri reports that she is \"in a place this afternoon\" highlighting feeling as though she was negative and took the group down. She did report getting more out of the tuesdays packet and " wanting to read it tonight.   Plan: Continue with PHP   Pt to F/U with treatment goals as outlined in treatment plan.  Pt to F/U with local ED/call 911 should SI/HI arises.    Britta Menon, LISANDRA

## 2023-08-25 NOTE — GROUP NOTE
Date:  8/25/2023  Start Time:  12:10 PM  End Time:   1:10 PM  Keri Hilton, YOB: 1950  Psychoeducational/Skills Based Group  8 members attended group today    Group Focus: Goal of group was to introduce skills and psychoeducation related to topic of PHP day.   Group members were provided instruction and opportunities to practice presented skills.  Clinician answered questions as they arose and shared how presented skills can be utilized on a daily basis to increase emotional wellness.      About the Group: Resiliency Building Session 5: Resiliency Building Psycho-Ed/Skills Based Group Summary  Topic of curriculum was “Resiliency Building”. Clinician began group by discussing hos protector factors can help foster resiliency, using a worksheet from [employer]. Group members were encouraged to identify protective factors that they would like to develop/strengthen in order to help them overcome obstacles. Clinician led the group through a time of reflection on prioritizing values and time management, with the goal of help group members identify their values to build up their resilience. Group members were asked the following questions: 1) in what areas of your life do you want to invest your time/energy/resources? 2) What areas of your life do you want to prioritize to assist in healthy decision-making? 3) How can you cultivate more time for play/creativity/rest? 4) How do or can we manage the barriers that interfere with prioritizing our values? 5) What happens when our actions are not aligned with our values/priorities? How does that make use feel? How can we start living more aligned with our values? Clinician then encouraged group members to set goals related to developing/strengthening their values, encouraging them to set small, achievable goals to build optimism and confidence. Clinician closed group by asking each group member to choose 3 strengths from the Strengths Deck, and to assist one  another if struggling to identify strengths. Group members were encouraged to write down or note their strengths for use at check-out group.     Patricia Vinson MD was onsite when services were rendered.          Patient  was an active group participant.  Assessment/observation of group participation/presentation: Keri was engaged in group discussion, though seems fairly fixed in negative and limiting beliefs about being unable to trust or rely on social relationships as part of resiliency factors. She expressed that due to her age she lacks ability to engage in resiliency-promoting action, and struggled to ID healthier ways of thinking. She reported that despite this, she did not feel depressed in sharing her negative beliefs.  Treatment plan areas addressed: Depression, Anxiety, Relationship issues/Communication skills and Building strengths/resilience  Visit Diagnosis:      ICD-10-CM ICD-9-CM   1. Generalized anxiety disorder  F41.1 300.02   2. 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  Pt to F/U with local ED/call 911 should SI/HI arises.    Rita Kiser LCSW

## 2023-08-25 NOTE — GROUP NOTE
Date: 8/25/2023  Start Time:   9:30 AM  End Time: 10:30 AM    Keri Hilton, YOB: 1950,  was an active group participant.    Community Check In Group  8 attended group today.     Group Focus: Goal of group was to welcome new and returning PHP members to the [facility], check in regarding group member needs, and assess safety.    About the Group: Clinician reviewed [facility] Group Code of Conduct and answered any questions that arose.  Clinician welcomed new members to the group and facilitated brief introductions of group members to one another.  Introduced topic/theme for the treatment day:Resiliency Building.  Encouraged group members to request support throughout the day as needed.  Clinician reviewed group members’ Morning Reflection Sheets and did a verbal check in with each group member regarding safety (SI/HI/self harm), safety planning, medication compliance, if they needed to consult with anyone today and individual treatment needs/goals for the day.  Session ended with a brief meditation/calming activity.   Patricia Vinson MD was on site when services rendered.      Morning Reflection:   Depression:  1/5  Anxiety:  3/5  Anger:  3/5  Thoughts of taking one's own life today?:  0/5 - None  Self Injury:  0/5 - None  Engaged in Self Injury since yesterday: No    Thoughts of hurting other people today?:  0/5 - None  Compulsive Behaviors?:  0-None  Compulsive Behaviors?:  No  Are you able to follow your safety plan?: Yes      I can/will:  Journal and Use grounding with my 5 senses  Substance Use: Yes      Tobacco - When:  Vary  Amount:  Normal  Self Care: Yes      I am satisfied with my daily hygiene: Yes    Nourish:  Yes    Number of meals eaten yesterday:  2 ++  Sleep:  No    Number of hours:  5  Social Interaction:  Yes    Social Interaction:  Moderate    Moderate with:  Family  Physical Activity:  Yes    Describe Practice: normal  Mindful Activity:  Yes    Describe Practice:  Shower slow  Medication:  Yes     Medication:  Prescribed  Thought Content:  Racing Thoughts and Cloudy  Which coping skills did you use yesterday? How did they help or not help?  What barriers did you face?:  Shower slow and easy- may have claustrophobic issues  Pt reported significant or positive event/step:  Talked to sister about her, not all about me  Pt identified goal for the treatment day:  IOP Info  Pt treatment plan areas addressed:  Depression, Anxiety, Coping skills, Anger Management and Self care  Pt requested consult with:  Therapist      Visit Diagnosis:      ICD-10-CM ICD-9-CM   1. Generalized anxiety disorder  F41.1 300.02   2. Psychogenic nonepileptic seizure  F44.5 300.11       Assessment/Observations:  Keri checked in discussing adjusting to her medications and being worried that its impacting her sleep.   Plan:  Continue with PHP   Pt to F/U with treatment goals as outlined in treatment plan.  Pt to F/U with local ED/call 911 should SI/HI arises.    Evelia Crowell, LPC

## 2023-08-25 NOTE — GROUP NOTE
Date:  8/25/2023  Start Time:  10:40 AM  End Time:  11:40 AM  Keri Hilton, YOB: 1950   8 members attended group today.    Group Focus:  Goal of group was to establish and build social support, review coping skills, normalize the struggles of being human, and increase self awareness and self compassion.    About the Group:  Clinician started group with a prompting quote/song to facilitate group discussion.  Group engaged in active and supportive discussion. Topics discussed included emotion regulation, paced breathing, grounding, nervous system regulation, panic, fear, shame, invalidation and expectation.  Group members were able to offer insightful and supportive feedback and suggestions about how to manage difficult situations.  Group ended with a meditation/song.  Patricia Vinson MD was onsite when services were rendered.        Patient  was an active group participant.  Assessment/observation of group participation/presentation: Keri was engaged, following discussions and relating to topics of the brain body connection and mindful practice.   Treatment plan areas addressed: Depression, Anxiety, Chronic Pain/Medical Issues and Building strengths/resilience  Visit Diagnosis:      ICD-10-CM ICD-9-CM   1. Generalized anxiety disorder  F41.1 300.02   2. 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  Pt to F/U with local ED/call 911 should SI/HI arises.    Evelia Crowell, LISANDRA

## 2023-08-28 ENCOUNTER — PHP (OUTPATIENT)
Dept: PSYCHIATRY | Facility: HOSPITAL | Age: 73
End: 2023-08-28
Attending: NURSE PRACTITIONER
Payer: COMMERCIAL

## 2023-08-28 ENCOUNTER — DOCUMENTATION (OUTPATIENT)
Dept: PSYCHIATRY | Facility: HOSPITAL | Age: 73
End: 2023-08-28
Payer: COMMERCIAL

## 2023-08-28 DIAGNOSIS — F41.1 GENERALIZED ANXIETY DISORDER: Primary | ICD-10-CM

## 2023-08-28 DIAGNOSIS — F44.5 PSYCHOGENIC NONEPILEPTIC SEIZURE: ICD-10-CM

## 2023-08-28 PROCEDURE — H0035 MH PARTIAL HOSP TX UNDER 24H: HCPCS | Performed by: COUNSELOR

## 2023-08-28 NOTE — GROUP NOTE
Date: 8/28/2023  Start Time:   9:30 AM  End Time: 10:30 AM    Keri Hilton, YOB: 1950,  was an active group participant.    Community Check In Group  7 attended group today.     Group Focus: Goal of group was to welcome new and returning PHP members to the [facility], check in regarding group member needs, and assess safety.    About the Group: Clinician reviewed [facility] Group Code of Conduct and answered any questions that arose.  Clinician welcomed new members to the group and facilitated brief introductions of group members to one another.  Introduced topic/theme for the treatment day:Self Care.  Encouraged group members to request support throughout the day as needed.  Clinician reviewed group members’ Morning Reflection Sheets and did a verbal check in with each group member regarding safety (SI/HI/self harm), safety planning, medication compliance, if they needed to consult with anyone today and individual treatment needs/goals for the day.  Session ended with a brief meditation/calming activity.   Lukas Yeh DO was on site when services rendered.      Morning Reflection:   Depression:  3/5  Anxiety:  3/5  Anger:  5/5  Thoughts of taking one's own life today?:  0/5 - None  Self Injury:  0/5 - None  Engaged in Self Injury since yesterday: No    Thoughts of hurting other people today?:  0/5 - None  Compulsive Behaviors?:  0-None  Compulsive Behaviors?:  No  Are you able to follow your safety plan?: Yes      I can/will:  Call someone and Use a coping skill  Substance Use: Yes      Tobacco - When:  Vape yesterday  Amount:  Req. more  Self Care: Yes      I am satisfied with my daily hygiene: Yes    Nourish:  Yes    Number of meals eaten yesterday:  2, lots over wknd  Sleep:  No    Used sleep aid?: No      Describe sleep:  Restless    Number of hours:  5-6  Social Interaction:  Yes    Social Interaction:  Moderate and Minimal    Moderate with:  Family and Friends  Physical Activity:  Yes    Describe  "Practice: reg  Mindful Activity:  Yes    Describe Practice:  [place], walked, redid mindful package  Medication:  Yes    Medication:  Prescribed  Thought Content:  Cloudy, Racing Thoughts and Irritable  Which coping skills did you use yesterday? How did they help or not help?  What barriers did you face?:  [place] breathing - worked  Mindful - walk pray - couldn't walk too far - weak  Pt reported significant or positive event/step:  Bought 2.4 vapes, redid mindful package  Pt identified goal for the treatment day:  Anger at self  Pt treatment plan areas addressed:  Depression, Anxiety, Mood dysregulation, Anger Management, Self care, Coping skills and Mindfulness  Pt requested consult with:  None      Visit Diagnosis:      ICD-10-CM ICD-9-CM   1. Generalized anxiety disorder  F41.1 300.02   2. Psychogenic nonepileptic seizure  F44.5 300.11       Assessment/Observations:  Keri reported \"warming up\" to her brother's daily check in calls. She reported increased vaping over the weekend to cope with \"general uncertainty\" and feelings of being alone, though notes overall goal to cut back on vaping and bought cartridges with less concentrated nicotine. She reports reviewing PHP packets over weekend.  Plan:  Continue with PHP   Pt to F/U with treatment goals as outlined in treatment plan.  Pt to F/U with local ED/call 911 should SI/HI arises.    Rita Kiser LCSW          "

## 2023-08-28 NOTE — GROUP NOTE
Date: 8/28/2023  Start Time:  1:20 PM  End Time:   2:20 PM  Keri Hilton, YOB: 1950,  was an active group participant.    Wrap Up Group  6 attended group today.   Group Focus: Goal of group was to wrap up and process the treatment day.  Assist  members in planning for the evening ahead and to assess and plan surrounding any  safety needs.      About the Group:  Clinician reviewed group members Afternoon  Reflection Sheets and did a verbal check in with each group member regarding safety  (SI/HI/self harm) and any necessary safety planning .  Session ended with a brief  meditation/calming activity.  Lukas Yeh DO was onsite when  services rendered.        Afternoon Reflection:   Depression:  1/5  Anxiety:  1/5  Anger:  3/5  Thoughts of taking one's own life today?:  0/5 - None  Self Injury:  0/5 - None  Engaged in self-injury?  No  Thoughts of hurting other people today?:  0/5 - None  Compulsive Behaviors?:  0-None  Compulsive Behaviors?: No  Are you able to follow our safety plan?: Yes      I can/will:  Call someone and Use a coping skill  The most significant moment of the day or insight for me was...:  Talking about anger  Three things I am grateful for today are:  , brother, sons  Did you meet your goal for today? If not, what might you do tomorrow or this week to achieve it?:  Yes sort of   My evening self-care plan is:  Work on bedroom for 30 minutes      Pt treatment plan areas addressed: Anxiety, Chronic Pain/Medical Issues and Self Care    Visit Diagnosis:      ICD-10-CM ICD-9-CM   1. Generalized anxiety disorder  F41.1 300.02   2. Psychogenic nonepileptic seizure  F44.5 300.11       Assessment/Observations:  Keri checked out hopeful for her eveing, reporting less anger . S he made jokes that she wanted it to be zero but was happy that it was reduced.   Plan: Continue with PHP   Pt to F/U with treatment goals as outlined in treatment plan.  Pt to F/U with local ED/call 911 should  SI/HI arises.    Evelia Crowell, LPC

## 2023-08-28 NOTE — PROGRESS NOTES
LSW met with pt to discuss IOP.  Pt ask if she can attend 2 out of 3 days for IOP.  LSW explained full attendance of 3 days is required.  LSW probed about asking work about schedule change, pt declined b/c her boss is covering her shifts and does not want to ask for change.  LSW explained [facility] recommendation to step down to IOP for increased results, discussed alternative IOP but only [place] and [facility] take Medicare Advantage plans.  Pt inquired about attending IOP in October, advise to call [facility] to schedule in Sept, will have to see about availability and pt feels she can change her schedule in October.  LSW advised pt schedule with IT and explain her plans to wait on IOP, pt will call IT Evelia for apts, will ask about 2x week and will update team.  Pt has apt with Dr. Vinson.  Pt feels Rx Risperdal is reason for worsening depression and improved since stopping med.  LSW will update team.

## 2023-08-28 NOTE — GROUP NOTE
"Date:  8/28/2023  Start Time:  10:40 AM  End Time:  11:40 AM  Keri Hilton, YOB: 1950   5 members attended group today.    Group Focus:  Goal of group was to establish and build social support, review coping skills, normalize the struggles of being human, and increase self awareness and self compassion.    About the Group:  Clinician started group with a prompting quote/song to facilitate group discussion.  Group engaged in active and supportive discussion. Topics discussed included depression sx and impact on life and relationships, anger, automatic negative thoughts.  Group members were able to offer insightful and supportive feedback and suggestions about how to manage difficult situations.  Group ended with a meditation/song.  Lukas Yeh DO was onsite when services were rendered.        Patient  was an active group participant.  Assessment/observation of group participation/presentation: Keri presented as attentive and asked clarifying questions. She presented with negative outlook, sharing that \"there is nothing to embrace\" about her current self and seeming to be idealizing her past self as well. She reported anger at her diagnoses and we explored her relationship to anger. She expressed belief that nice people shouldn't be angry and she was uncertain of how to feel and express her anger in a healthy way.   Treatment plan areas addressed: Depression, Anxiety, Anger Management, Self Care, Mood dysregulation and Coping Skills  Visit Diagnosis:      ICD-10-CM ICD-9-CM   1. Generalized anxiety disorder  F41.1 300.02   2. 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  Pt to F/U with local ED/call 911 should SI/HI arises.    Rita Kiser LCSW      "

## 2023-08-28 NOTE — GROUP NOTE
Date:  8/28/2023  Start Time:  12:10 PM  End Time:   1:10 PM  Keri Hilton, YOB: 1950  Psychoeducational/Skills Based Group  6 members attended group today    Group Focus: Goal of group was to introduce skills and psychoeducation related to topic of PHP day.   Group members were provided instruction and opportunities to practice presented skills.  Clinician answered questions as they arose and shared how presented skills can be utilized on a daily basis to increase emotional wellness.      About the Group: Self Care Session 6: Self Care Psycho-Ed/Skills Based Group Summary  Topic of curriculum was “Self Care.”  Clinician introduced the idea of setting goals for self-care and the importance of taking time for self amidst people’s busy lives. Clinician facilitated discussion on  “[place] of Control” to help group members practice recognizing what they can and cannot control.  Clinician utilized worksheet on “[place] of Control” to help group members increase identification of what is within their control. Clinician led a group discussion encouraging group members to identify ideas for self care. Group members highlighted strategies for self care that they have used past, present and will use in the future. Clinician utilized “Behavior Activation Worksheet”  from [employer] to help members set goal for self care that is realistic and able to be managed in a realistic time frame. Clinician closed the group session with a mindful breathing meditation from [employer] for self care.     Lukas Yeh DO was onsite when services were rendered.          Patient  was an active group participant.  Assessment/observation of group participation/presentation: Keri was engaged and completed in group activities on the topic of self care.  She reported feeling calmer after the Vagus Nerve exercise.   Treatment plan areas addressed: Anxiety, Chronic Pain/Medical Issues and Self Care  Visit Diagnosis:       ICD-10-CM ICD-9-CM   1. Generalized anxiety disorder  F41.1 300.02   2. 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  Pt to F/U with local ED/call 911 should SI/HI arises.    Evelia Crowell, LPC

## 2023-08-29 ENCOUNTER — PHP (OUTPATIENT)
Dept: PSYCHIATRY | Facility: HOSPITAL | Age: 73
End: 2023-08-29
Attending: NURSE PRACTITIONER
Payer: COMMERCIAL

## 2023-08-29 DIAGNOSIS — F44.5 PSYCHOGENIC NONEPILEPTIC SEIZURE: ICD-10-CM

## 2023-08-29 DIAGNOSIS — F41.1 GENERALIZED ANXIETY DISORDER: Primary | ICD-10-CM

## 2023-08-29 PROCEDURE — G0177 OPPS/PHP; TRAIN & EDUC SERV: HCPCS | Performed by: NURSE PRACTITIONER

## 2023-08-29 PROCEDURE — H0035 MH PARTIAL HOSP TX UNDER 24H: HCPCS

## 2023-08-29 ASSESSMENT — ENCOUNTER SYMPTOMS
DIZZINESS: 1
HEADACHES: 0
TREMORS: 0
FATIGUE: 0
GASTROINTESTINAL NEGATIVE: 1

## 2023-08-29 NOTE — GROUP NOTE
Date:  8/29/2023  Start Time:  10:40 AM  End Time:  11:40 AM  Keri Hilton, YOB: 1950   7 members attended group today.    Group Focus:  Goal of group was to establish and build social support, review coping skills, normalize the struggles of being human, and increase self awareness and self compassion.    About the Group:  Clinician started group with a prompting quote/song to facilitate group discussion.  Group engaged in active and supportive discussion. Topics discussed included distress tolerance skill- pros and cons, hindsight bias, decision fatigue, affect regulation and cope ahead.  Group members were able to offer insightful and supportive feedback and suggestions about how to manage difficult situations.  Group ended with a meditation/song.  Brook Escobedo MD was onsite when services were rendered.        Patient  was an active group participant.  Assessment/observation of group participation/presentation: Keri was attentive in group, taking notes and relating to decision anxiety and the need for radical acceptance.   Treatment plan areas addressed: Anxiety, Chronic Pain/Medical Issues and Distress Tolerance  Visit Diagnosis:      ICD-10-CM ICD-9-CM   1. Generalized anxiety disorder  F41.1 300.02   2. 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  Pt to F/U with local ED/call 911 should SI/HI arises.    Evelia Crowell, LPC

## 2023-08-29 NOTE — GROUP NOTE
Date: 8/29/2023  Start Time:  1:20 PM  End Time:   2:20 PM  Keri Hilton, YOB: 1950,  was a passive group participant.    Wrap Up Group  7 attended group today.   Group Focus: Goal of group was to wrap up and process the treatment day.  Assist  members in planning for the evening ahead and to assess and plan surrounding any  safety needs.      About the Group:  Clinician reviewed group members Afternoon  Reflection Sheets and did a verbal check in with each group member regarding safety  (SI/HI/self harm) and any necessary safety planning .  Session ended with a brief  meditation/calming activity.  Brook Escobedo MD was onsite when  services rendered.        Afternoon Reflection:   Depression:  1/5  Anxiety:  1/5  Anger:  1/5  Thoughts of taking one's own life today?:  0/5 - None  Self Injury:  0/5 - None  Engaged in self-injury?  No  Thoughts of hurting other people today?:  0/5 - None  Compulsive Behaviors?:  0-None  Compulsive Behaviors?: No  Are you able to follow our safety plan?: Yes      I can/will:  Call someone and Journal  The most significant moment of the day or insight for me was...:  Slow day - can't decide  Three things I am grateful for today are:  Me, , granddaughters  Did you meet your goal for today? If not, what might you do tomorrow or this week to achieve it?:  Yes - participated  My evening self-care plan is:  Get ready for floor repair, very tired today, not thinking of much else      Pt treatment plan areas addressed: Depression, Anxiety, Self Care, Coping Skills, Distress Tolerance and DBT Skills    Visit Diagnosis:      ICD-10-CM ICD-9-CM   1. Generalized anxiety disorder  F41.1 300.02   2. Psychogenic nonepileptic seizure  F44.5 300.11       Assessment/Observations:  Keri presented as passive, though notes she did participate during the day despite not being able to ID significant event, and expressed she was unsatisfied with self-care plan, wanting to nap but  not planning to do so, denied needing additional support to develop additional self-care strategies.  Plan: Continue with PHP   Pt to F/U with treatment goals as outlined in treatment plan.  Pt to F/U with local ED/call 911 should SI/HI arises.    Rita Kiser LCSW

## 2023-08-29 NOTE — GROUP NOTE
Date:  8/29/2023  Start Time:  12:10 PM  End Time:   1:10 PM  Keri Hilton, YOB: 1950  7 Attended Nursing Ed Group     The Nursing Education Group Topic is Sleep Hygiene.  RN began group by discussing sleep hygiene techniques and provided group members with a handout. Group members were encouraged to identify sleep suggestions that they would like to try including sleep diaries, essential oils,  medications, etc. RN led the group through a time of reflection on prioritizing sleep hygiene.    Brook Escobedo MD was on site when services rendered.          Patient  was a passive group participant.  Assessment/observation of group participation/presentation: Keri was encouraged to participate and then engaged in topic.  She asked appropriate on topic questions re: medication side effects.   Visit Diagnosis:     ICD-10-CM ICD-9-CM   1. Generalized anxiety disorder  F41.1 300.02   2. Psychogenic nonepileptic seizure  F44.5 300.11       Plan:  Continue with PHP  Pt to F/U with treatment goals as outlined in treatment plan.  Pt to F/U with local ED/call 911 should SI/HI arises.    Traci Schroeder RN

## 2023-08-29 NOTE — PROGRESS NOTES
PHP PSYCHIATRY FOLLOW UP/MEDICATION CHECK    Keri Hilton is a 73 y.o. female who presents for Follow-up and Med Management.    HPI  Seen for PHP discharge.  Feels she has had some more subtle improvements- able to have more humor.  Some ongoing racing thoughts, denies panic.  Continues to experience depression and anxiety, particularly when she thinks of the potential for her to have another nonepileptiform seizure episode, though denies any since PHP.  Interests intact.  Denies SI/HI.  Denies intrusive thoughts.       Declined step down to IOP- has to return to work and is concerned about finances.  Does feel if she gives her employer notice in the future she would be able to return to IOP if needed.  Will return to her OP therapist with plans to continue to review material from [facility] groups.       Psychiatric ROS:   Sleep:Fair, some disruption- half days of the week, can take an hour to fall back to sleep.  No napping during the day.    Appetite: Fair, improved  Exercise: limited  ETOH/Substances: 1 cup of coffee in the morning, lowered nicotine in vape  SI/HI: Denies SI/HI, future oriented, feels safe at home    Medical Review of Systems  Review of Systems   Constitutional: Negative for fatigue.   Gastrointestinal: Negative.    Musculoskeletal:        Bilateral wrist pain   Neurological: Positive for dizziness (after taking gabapentin). Negative for tremors and headaches.   Arthritis related pain in back    PMSH:   Gabapentin increased to 1800 mg    Risk/Benefit/side Effects discussed regarding the following medications:  Abilify  PDMP Queried: Yes    Allergies:   Allergies   Allergen Reactions   • Shellfish Containing Products Nausea And Vomiting   • Shellfish Derived        Current Outpatient Medications:   •  ARIPiprazole (ABILIFY) 2 mg tablet, Take 1 tablet (2 mg total) by mouth daily., , Disp: 30 tablet, Rfl: 0  •  aspirin 81 mg enteric coated tablet, Take 81 mg by mouth daily., , Disp: , Rfl:   •   atorvastatin (LIPITOR) 80 mg tablet, Take 80 mg by mouth daily. , , Disp: , Rfl:   •  busPIRone (BUSPAR) 10 mg tablet, Take 2 tablets (20 mg total) by mouth 3 (three) times a day., , Disp: 540 tablet, Rfl: 0  •  gabapentin (NEURONTIN) 600 mg tablet, TAKE 1 TABLET BY MOUTH TWICE A DAY, , Disp: 180 tablet, Rfl: 1  •  lisinopriL (PRINIVIL) 5 mg tablet, Take 2.5 mg by mouth once daily., , Disp: , Rfl:        Objective     Physical Exam  Constitutional:       Appearance: Normal appearance.   Neurological:      Mental Status: She is alert.      Motor: No tremor.      Coordination: Coordination is intact.      Gait: Gait is intact.         There were no vitals taken for this visit.    Mental Status Exam  Appearance: well groomed and appropriate attire  Gait and Motor: no abnormal movements  Speech: normal rate/rhythm/volume  Mood: depressed, anxious and better  Affect: full range  Associations: coherent  Thought Process: goal-directed and flight of ideas  Thought Content: no auditory or visual hallucinations. and appropriate to situation  Suicidality/Homicidality: denies  Judgement/Insight: acknowledges illness and fair insight   Orientation: intact to interview  Attention: alert  Language: normal         [place] Suicide Severity Rating Scale:         Safe-T Assessment:           Labs  8/2023  BMP- sodium slightly elevated 145  WBC- WNL     5/2023  Lipid panel- triglycerides elevated 161  CMP- glucose 100     8/2022  TSH WNL                   ECG   None in Epic       Brief Psychiatric Formulation: Keri is a 72 yr old with a history of BPAD and anxiety and PMH of HTN, MI, nonepileptiform seizures admitted to [facility] for worsening depression and anxiety.  She has a history of four past psychiatric hospitalizations and three suicide attempts.  Precipitating factors include anxiety around her nonepileptiform seizures, recent move, and recent medication changes.  Strengths include connection with OP providers and connection with  family.  Barriers include poor concentration, difficulties identifying precipitating factors for worsening anxiety, and need to improve anxiety coping skills.      At PHP intake, risperidone discontinued and Abilify started to target depression, which she has overall tolerated and seeing benefit.  She was continued on buspirone at her current dose.  On her discharge visit she noted continued mood improvement and denied SI/HI.  She decline recommendation for IOP on discharge citing work and financial barriers.       Pt is at chronic elevated risk of intentional harm to self given hx of depression, anxiety, and suicide attempts. Her acute risk is elevated by ongoing depression/anxiety sx, and passive SI though this acute risk is mitigated by lack of current active suicidal ideation, lack of plan/intent, commitment to family, treatment-seeking behavior, and future orientation. In sum, her acute risk of intentional harm to self is not significantly elevated from usual baseline as to warrant ongoing PHP and she is appropriate to step down to OP LOC.  She was recommended IOP though declined and is aware of how to seek IOP admission should she need that support in the future..        Based on [place] Suicide Screen and current clinical assessment, patient is determined Low Risk.  Suicide Risk/Suicidal Ideation will be added to patient's treatment plan.     Plan:   • Discharge from PHP 8/31/23  • Continue Abilify 2 mg daily for depression and mood stability  • Continue buspirone 20 mg TID for anxiety  • Follow up with PCP and neurology as indicated  • Aftercare plan: OP therapy and psychiatry  • Patient to F/U with treatment goals as outlined in treatment plan.  • Patient to F/U with local ED or call 911 should SI/HI arise.      Visit Diagnosis:    ICD-10-CM ICD-9-CM   1. Generalized anxiety disorder  F41.1 300.02   2. Depressive disorder  F32.A 311         TJ Summers @ 3:56 PM

## 2023-08-29 NOTE — GROUP NOTE
Date: 8/29/2023  Start Time:   9:30 AM  End Time: 10:30 AM    Keri Hilton, YOB: 1950,  was an active group participant.    Community Check In Group  7 attended group today.     Group Focus: Goal of group was to welcome new and returning PHP members to the [facility], check in regarding group member needs, and assess safety.    About the Group: Clinician reviewed [facility] Code of Conduct and answered any questions that arose.  Clinician welcomed new members to the group and facilitated brief introductions of group members to one another.  Introduced topic/theme for the treatment day:Distress Tolerance.  Encouraged group members to request support throughout the day as needed.  Clinician reviewed group members’ Morning Reflection Sheets and did a verbal check in with each group member regarding safety (SI/HI/self harm), safety planning, medication compliance, if they needed to consult with anyone today and individual treatment needs/goals for the day.  Session ended with a brief meditation/calming activity.   Brook Escobedo MD was on site when services rendered.      Morning Reflection:   Depression:  1/5  Anxiety:  1/5  Anger:  1/5  Thoughts of taking one's own life today?:  0/5 - None  Self Injury:  0/5 - None  Engaged in Self Injury since yesterday: No    Thoughts of hurting other people today?:  0/5 - None  Compulsive Behaviors?:  0-None  Compulsive Behaviors?:  No  Are you able to follow your safety plan?: Yes      I can/will:  Call someone and Use a coping skill  Substance Use: Yes      Tobacco - Amount:  Normal  Self Care: Yes      I am satisfied with my daily hygiene: Yes    Nourish:  Yes    Number of meals eaten yesterday:  2 + lots    Describe:  Nutritious  Sleep:  Yes    Used sleep aid?: No      Number of hours:  7  Social Interaction:  Yes    Social Interaction:  Moderate    Moderate with:  Family ([employer] + girls)  Physical Activity:  Yes    Describe Practice: normal  Mindful  Activity:  Yes    Describe Practice:  Prayer, worked house, hung pictures  Medication:  Yes    Medication:  Prescribed  Thought Content:  Clear (was cloudy racing)  Which coping skills did you use yesterday? How did they help or not help?  What barriers did you face?:  Mindful -  fell - was afraid - just started doing things - which is a more normal response for me  Pt reported significant or positive event/step:  Made AM plan - thank God for day, stretch, breathe, be mindful w my thoughts  Made beti therapist next week to start back  Pt identified goal for the treatment day:  Get most I can from group  Pt treatment plan areas addressed:  Depression, Anxiety, Mood dysregulation, DBT Skills, Distress Tolerance, Coping skills, Self care and Mindfulness  Pt requested consult with:  None      Visit Diagnosis:      ICD-10-CM ICD-9-CM   1. Generalized anxiety disorder  F41.1 300.02   2. Psychogenic nonepileptic seizure  F44.5 300.11       Assessment/Observations:  Keri checked in with a bright affect, noting improved appetite and feeling more like herself. S he did mention stress with her  falling yesterday but feeling like she coped appropriately.   Plan:  Continue with PHP   Pt to F/U with treatment goals as outlined in treatment plan.  Pt to F/U with local ED/call 911 should SI/HI arises.    Evelia Crowell, LPC

## 2023-08-30 ENCOUNTER — PHP (OUTPATIENT)
Dept: PSYCHIATRY | Facility: HOSPITAL | Age: 73
End: 2023-08-30
Attending: NURSE PRACTITIONER
Payer: COMMERCIAL

## 2023-08-30 ENCOUNTER — TELEPHONE (OUTPATIENT)
Dept: PSYCHIATRY | Facility: HOSPITAL | Age: 73
End: 2023-08-30
Payer: COMMERCIAL

## 2023-08-30 DIAGNOSIS — F41.1 GENERALIZED ANXIETY DISORDER: Primary | ICD-10-CM

## 2023-08-30 DIAGNOSIS — F32.A DEPRESSIVE DISORDER: ICD-10-CM

## 2023-08-30 DIAGNOSIS — F44.5 PSYCHOGENIC NONEPILEPTIC SEIZURE: ICD-10-CM

## 2023-08-30 PROCEDURE — H0035 MH PARTIAL HOSP TX UNDER 24H: HCPCS

## 2023-08-30 PROCEDURE — 99213 OFFICE O/P EST LOW 20 MIN: CPT | Performed by: NURSE PRACTITIONER

## 2023-08-30 RX ORDER — ARIPIPRAZOLE 2 MG/1
2 TABLET ORAL DAILY
Qty: 30 TABLET | Refills: 0 | Status: SHIPPED | OUTPATIENT
Start: 2023-08-30 | End: 2023-09-11 | Stop reason: SDUPTHER

## 2023-08-30 NOTE — GROUP NOTE
Date:  8/30/2023  Start Time:  12:10 PM  End Time:   1:10 PM  Keri Hilton, YOB: 1950  Psychoeducational/Skills Based Group  7 members attended group today    Group Focus: Goal of group was to introduce skills and psychoeducation related to topic of PHP day.   Group members were provided instruction and opportunities to practice presented skills.  Clinician answered questions as they arose and shared how presented skills can be utilized on a daily basis to increase emotional wellness.      About the Group: Trauma Session 8: Trauma Psycho Ed/Skills Based Group Summary  Topic of Curriculum was “Trauma”. Clinician educated group members about the body’s response to trauma and shared the  “Brain on Trauma” worksheet from [employer]. Group members were educated about neuroplasticity and how the brain is changeable through practice of coping skills, grounding techniques and time. Clinician showed a video on Neuroplasticity from [employer]  to highlight changeability of neurons with time and effort.  Clinician introduced and facilitated practice with group members of two grounding techniques, Progressive Muscle Relaxation from [employer] and Bilateral Stimulation “Butterfly hug” from [employer]. Group members then discussed their experiences with one another and Clinician closed group session with a grounding meditation of body scan from   Mind[employer]: Rosaura Spence.    Brook Escobedo MD was onsite when services were rendered.          Patient  was an active group participant.  Assessment/observation of group participation/presentation: Keri engaged in each skill demonstrations. S he reflected with group on her experience, noting she became so calm she fell asleep.  She was validated and provided psych ed on the calming effect of the kills and she planned on practicing them this evening.   Treatment plan areas addressed: Anxiety, Chronic Pain/Medical Issues and Trauma  Visit Diagnosis:      ICD-10-CM  ICD-9-CM   1. Generalized anxiety disorder  F41.1 300.02   2. 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  Pt to F/U with local ED/call 911 should SI/HI arises.    Evelia Crowell, LPC

## 2023-08-30 NOTE — GROUP NOTE
Date: 8/30/2023  Start Time:   9:30 AM  End Time: 10:30 AM    Keri Hilton, YOB: 1950,  was an active group participant.    Community Check In Group  7 attended group today.     Group Focus: Goal of group was to welcome new and returning PHP members to the [facility], check in regarding group member needs, and assess safety.    About the Group: Clinician reviewed [facility] Group Code of Conduct and answered any questions that arose.  Clinician welcomed new members to the group and facilitated brief introductions of group members to one another.  Introduced topic/theme for the treatment day:Trauma.  Encouraged group members to request support throughout the day as needed.  Clinician reviewed group members’ Morning Reflection Sheets and did a verbal check in with each group member regarding safety (SI/HI/self harm), safety planning, medication compliance, if they needed to consult with anyone today and individual treatment needs/goals for the day.  Session ended with a brief meditation/calming activity.   Brook Escobedo MD was on site when services rendered.      Morning Reflection:   Depression:  1/5  Anxiety:  1/5  Anger:  1/5  Thoughts of taking one's own life today?:  0/5 - None  Self Injury:  0/5 - None  Engaged in Self Injury since yesterday: No    Thoughts of hurting other people today?:  0/5 - None  Compulsive Behaviors?:  0-None  Compulsive Behaviors?:  No  Are you able to follow your safety plan?: Yes      I can/will:  Journal and Use a coping skill  Substance Use: Yes      Tobacco - When:  Daily  Amount:  Normal but changed to 2.4 from 5.00  Self Care: Yes      I completed my self care activity last night::  Shower    I am satisfied with my daily hygiene: Yes    Nourish:  Yes    Number of meals eaten yesterday:  2    Describe:  Normal and Nutritious  Sleep:  No    Used sleep aid?: No      Describe sleep:  Broken    Number of hours:  6.5  Social Interaction:  Yes    Social Interaction:  Moderate     "Moderate with:  Family  Physical Activity:  Yes    Describe Practice: normal  Mindful Activity:  Yes    Describe Practice:  Dedicated time to bedroom  Medication:  Yes    Medication:  Prescribed  Thought Content:  Clear and Irritable  Which coping skills did you use yesterday? How did they help or not help?  What barriers did you face?:  Dedicated to task  Pt reported significant or positive event/step:  Worked on bedroom-  very uncomfortable not organized from move 5 weeks ago  Pt identified goal for the treatment day:  Stay in moment don't drift and let go to other thoughts past and future    Pt treatment plan areas addressed:  Depression, Anxiety, Coping skills, CBT skills and Self-compassion  Pt requested consult with:  None      Visit Diagnosis:      ICD-10-CM ICD-9-CM   1. Generalized anxiety disorder  F41.1 300.02   2. 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 Keri reports feeling back to baseline today; \"normal\" \"run of the mill.\" She reports that she has decreased her vaping tobacco by reducing the amount of tobacco. She has begun to experience gratitude to her brothers call than frustration. She also is working on one task at a time to make things more manageable.   Plan:  Continue with PHP   Pt to F/U with treatment goals as outlined in treatment plan.  Pt to F/U with local ED/call 911 should SI/HI arises.    Britta Menon LPC          "

## 2023-08-30 NOTE — GROUP NOTE
Date:  8/30/2023  Start Time:  10:40 AM  End Time:  11:40 AM  Keri Hilton, YOB: 1950  7 members attended group today.    Group Focus:  Goal of group was to establish and build social support, review coping skills, normalize the struggles of being human, and increase self awareness and self compassion.    About the Group:  Clinician started group with a prompting quote/song to facilitate group discussion.  Group engaged in active and supportive discussion. Topics discussed included window of tolerance, neuro plasticity, behavioral activation.  Group members were able to offer insightful and supportive feedback and suggestions about how to manage difficult situations.  Group ended with a meditation/song.  Brook Escobedo MD was onsite when services were rendered.        Patient  was an active group participant.  Assessment/observation of group participation/presentation: Keri was engaged during group. She shared about how scheduling and pleasurable events helps to activate her and get out of bed. She related to window of tolerance and gave a peer feedback.   Treatment plan areas addressed: Depression, Anxiety, Trauma and Coping Skills  Visit Diagnosis:      ICD-10-CM ICD-9-CM   1. Generalized anxiety disorder  F41.1 300.02   2. 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  Pt to F/U with local ED/call 911 should SI/HI arises.    Britta Menon LPC

## 2023-08-30 NOTE — GROUP NOTE
Date: 8/30/2023  Start Time:  1:20 PM  End Time:   2:20 PM  Keri Hilton, YOB: 1950,  was an active group participant.    Wrap Up Group  7 attended group today.   Group Focus: Goal of group was to wrap up and process the treatment day.  Assist  members in planning for the evening ahead and to assess and plan surrounding any  safety needs.      About the Group:  Clinician reviewed group members Afternoon  Reflection Sheets and did a verbal check in with each group member regarding safety  (SI/HI/self harm) and any necessary safety planning .  Session ended with a brief  meditation/calming activity.  Brook Escobedo MD was onsite when  services rendered.        Afternoon Reflection:   Depression:  1/5  Anxiety:  1/5  Anger:  1/5  Thoughts of taking one's own life today?:  0/5 - None  Self Injury:  0/5 - None  Engaged in self-injury?  No  Thoughts of hurting other people today?:  0/5 - None  Compulsive Behaviors?:  0-None  Compulsive Behaviors?: No  Are you able to follow our safety plan?: Yes      I can/will:  Call someone, Journal and Use a coping skill  The most significant moment of the day or insight for me was...:  I felt a strong urge to sleep when doing the skills  Three things I am grateful for today are:  Me    2 sons  Did you meet your goal for today? If not, what might you do tomorrow or this week to achieve it?:  Stayed focused- yes  My evening self-care plan is:  Try the skills liek butterfly hug and alt nostril breath      Pt treatment plan areas addressed: Anxiety, Chronic Pain/Medical Issues and Trauma    Visit Diagnosis:      ICD-10-CM ICD-9-CM   1. Generalized anxiety disorder  F41.1 300.02   2. 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  She joked about telling her  about sleeping in group.   Plan: Continue with PHP   Pt to F/U with treatment goals as outlined  in treatment plan.  Pt to F/U with local ED/call 911 should SI/HI arises.    Evelia Crowell, LPC

## 2023-08-30 NOTE — Clinical Note
Keri reports an upcoming appointment with you- has been doing well with change from risperidone to Abilify (as was your previous plan).

## 2023-08-30 NOTE — TELEPHONE ENCOUNTER
LPC left message for OP therapist, updating her on progress in treatment including reduction of symptoms as well as pt's aftercare plan. LPC will send DC summary tomorrow.

## 2023-08-31 ENCOUNTER — PHP (OUTPATIENT)
Dept: PSYCHIATRY | Facility: HOSPITAL | Age: 73
End: 2023-08-31
Payer: COMMERCIAL

## 2023-08-31 ENCOUNTER — PHP (OUTPATIENT)
Dept: PSYCHIATRY | Facility: HOSPITAL | Age: 73
End: 2023-08-31
Attending: NURSE PRACTITIONER
Payer: COMMERCIAL

## 2023-08-31 DIAGNOSIS — F32.A DEPRESSIVE DISORDER: ICD-10-CM

## 2023-08-31 DIAGNOSIS — F44.5 PSYCHOGENIC NONEPILEPTIC SEIZURE: ICD-10-CM

## 2023-08-31 DIAGNOSIS — F41.1 GENERALIZED ANXIETY DISORDER: Primary | ICD-10-CM

## 2023-08-31 PROCEDURE — 90834 PSYTX W PT 45 MINUTES: CPT

## 2023-08-31 PROCEDURE — H0035 MH PARTIAL HOSP TX UNDER 24H: HCPCS | Performed by: COUNSELOR

## 2023-09-05 ENCOUNTER — OFFICE VISIT (OUTPATIENT)
Dept: BEHAVIORAL HEALTH | Facility: CLINIC | Age: 73
End: 2023-09-05
Payer: COMMERCIAL

## 2023-09-05 DIAGNOSIS — F41.9 ANXIETY: Primary | ICD-10-CM

## 2023-09-05 PROCEDURE — 90834 PSYTX W PT 45 MINUTES: CPT

## 2023-09-05 NOTE — PROGRESS NOTES
[facility] - Psychotherapy Follow-up Visit Note  Visit number: 16    Visit Type Performed: In-office     Keri Hilton presented today for a behavioral health visit.      SUBJECTIVE     Symptoms  Depression symptoms: anhedonia, fatigue/lack of energy and feelings of guilt  Anxiety symptoms: moderate anxiety/worry, difficulty controlling worry and irritability  Additional reported symptoms: NA    OBJECTIVE     Mental Status Evaluation  Patient's mood and affect were consistent with the context, and consistent with their baseline: Yes   Comments:  Calm and pleasant; awake and alert; oriented to person, place, and time    Suicidal Ideation/Homicidal Ideation Risk Assessment: not assessed. If not assessed, reason: Patient disclosed improved symptomology since PHP program.      Plan for Safety-   N/A:  Risk is assessed to be minimal; therefore, developing a safety plan is not indicated at this time.    Interventions  Goal Setting, Insight Development and Monitoring of Symptoms  We processed improved symptomology and reviewed skills Patient learned in her PHP program. We discussed things to continue working on in individual outpatient treatment. We further processed factors that could lead to seizure-like episodes of stress and the use of mindfulness or positive thinking to help managing her emotions better.     Psychotropic medications: no known adherence challenges, effective     Current Outpatient Medications   Medication Sig Dispense Refill    ARIPiprazole (ABILIFY) 2 mg tablet Take 1 tablet (2 mg total) by mouth daily. 30 tablet 0    aspirin 81 mg enteric coated tablet Take 81 mg by mouth daily.      atorvastatin (LIPITOR) 80 mg tablet Take 80 mg by mouth daily.       busPIRone (BUSPAR) 10 mg tablet Take 2 tablets (20 mg total) by mouth 3 (three) times a day. 540 tablet 0    gabapentin (NEURONTIN) 600 mg tablet TAKE 1 TABLET BY MOUTH TWICE A  tablet 1    lisinopriL (PRINIVIL) 5 mg  "tablet Take 2.5 mg by mouth once daily.       No current facility-administered medications for this visit.       ASSESSMENT       Progress  Patient's progress toward their goals is generally improving as Patient processed her experience in PHP and re-established goals for outpatient treatment.  Patient's symptomology has gradually improving. Patient disclosed feeling more in control and \"even\" since her PHP experience including more confidence in her ability to regulate her emotions. Patient denied recent bouts of uncontrollable anger or anxiety since changes in medication.     PLAN     Goals:  Development of skills for management of nonepileptic seizures    -Patient hopes to be able to return to driving     Recommendations  Individual Therapy  45 minutes weekly       Next visit plan:  -Further psychoeducation on recognizing emotions as they present and responding vs pushing down or ignoring them    I spent 45 minutes on this date of service performing the following activities: providing counseling and education.  "

## 2023-09-11 ENCOUNTER — OFFICE VISIT (OUTPATIENT)
Dept: BEHAVIORAL HEALTH | Facility: CLINIC | Age: 73
End: 2023-09-11
Payer: COMMERCIAL

## 2023-09-11 ENCOUNTER — OFFICE VISIT (OUTPATIENT)
Dept: PSYCHIATRY | Facility: HOSPITAL | Age: 73
End: 2023-09-11
Attending: PSYCHIATRY & NEUROLOGY
Payer: COMMERCIAL

## 2023-09-11 DIAGNOSIS — F41.9 ANXIETY: Primary | ICD-10-CM

## 2023-09-11 DIAGNOSIS — F41.1 GENERALIZED ANXIETY DISORDER: ICD-10-CM

## 2023-09-11 DIAGNOSIS — F44.5 PSYCHOGENIC NONEPILEPTIC SEIZURE: ICD-10-CM

## 2023-09-11 PROCEDURE — 99214 OFFICE O/P EST MOD 30 MIN: CPT | Performed by: PSYCHIATRY & NEUROLOGY

## 2023-09-11 PROCEDURE — 90834 PSYTX W PT 45 MINUTES: CPT

## 2023-09-11 RX ORDER — DIPHENHYDRAMINE HCL 25 MG
25 CAPSULE ORAL NIGHTLY PRN
COMMUNITY

## 2023-09-11 RX ORDER — ARIPIPRAZOLE 2 MG/1
2 TABLET ORAL DAILY
Qty: 90 TABLET | Refills: 0 | Status: SHIPPED | OUTPATIENT
Start: 2023-09-11 | End: 2023-11-06 | Stop reason: SDUPTHER

## 2023-09-11 ASSESSMENT — ABNORMAL INVOLUNTARY MOVEMENT SCALE (AIMS)
CURRENT_PROBLEMS_TEETH_DENTURES: NO
TOTAL_SCORE: 0
AIMS_SEVERITY: NONE, NORMAL
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
## 2023-09-11 NOTE — PROGRESS NOTES
[facility] - Psychotherapy Follow-up Visit Note  Visit number: 17    Visit Type Performed: In-office     Keri Hilton presented today for a behavioral health visit.      SUBJECTIVE     Symptoms  Depression symptoms: fatigue/lack of energy and feelings of guilt  Anxiety symptoms: mild anxiety/worry, difficulty controlling worry and irritability  Additional reported symptoms: NA    OBJECTIVE     Mental Status Evaluation  Patient's mood and affect were consistent with the context, and consistent with their baseline: Yes   Comments:  Calm and pleasant; awake and alert; oriented to person, place, and time    Suicidal Ideation/Homicidal Ideation Risk Assessment: not assessed. If not assessed, reason: Patient disclosed improved symptomology since PHP program.      Plan for Safety-   N/A:  Risk is assessed to be minimal; therefore, developing a safety plan is not indicated at this time.    Interventions  Cognitive Behavior Therapy  We processed fears of being alone or having to be independent if something were to happen to her  and how to change the potential outcome, such as through building additional supports, to avoid previous patterns of behaviors that presented following her divorce. We processed how her oldest son interacts with Patient including reasons behind that approach and how Patient can respond differently and potentially make change in that relationship. We processed a history of ruminating on work mistakes and discussed typical ways Patient would try to rectify those mistakes.     Psychotropic medications: no known adherence challenges, effective     Current Outpatient Medications   Medication Sig Dispense Refill    ARIPiprazole (ABILIFY) 2 mg tablet Take 1 tablet (2 mg total) by mouth daily. 90 tablet 0    aspirin 81 mg enteric coated tablet Take 81 mg by mouth daily.      atorvastatin (LIPITOR) 80 mg tablet Take 80 mg by mouth daily.       busPIRone (BUSPAR) 10 mg tablet  "Take 2 tablets (20 mg total) by mouth 3 (three) times a day. 540 tablet 0    diphenhydrAMINE (BENADRYL) 25 mg capsule Take 25 mg by mouth nightly as needed for sleep.      gabapentin (NEURONTIN) 600 mg tablet TAKE 1 TABLET BY MOUTH TWICE A  tablet 1    lisinopriL (PRINIVIL) 5 mg tablet Take 2.5 mg by mouth once daily.       No current facility-administered medications for this visit.       ASSESSMENT       Progress  Patient's progress toward their goals is generally improving as Patient processed deeply rooted anxieties that relate to Patient's confidence and self-worth.   Patient's symptomology has gradually improving. Patient continues to disclose improvement in being able to manage her emotions and denied significant moments of anxiety this past week.     PLAN     Goals:  Development of skills for management of nonepileptic seizures    -Patient hopes to be able to return to driving     Recommendations  Individual Therapy  45 minutes weekly       Next visit plan:  -Identify negative self beliefs/ \"narratives\"    I spent 45 minutes on this date of service performing the following activities: providing counseling and education.  "

## 2023-09-18 ENCOUNTER — OFFICE VISIT (OUTPATIENT)
Dept: BEHAVIORAL HEALTH | Facility: CLINIC | Age: 73
End: 2023-09-18
Payer: COMMERCIAL

## 2023-09-18 DIAGNOSIS — F41.9 ANXIETY: Primary | ICD-10-CM

## 2023-09-18 PROCEDURE — 90834 PSYTX W PT 45 MINUTES: CPT

## 2023-09-19 NOTE — PROGRESS NOTES
[facility] - Psychotherapy Follow-up Visit Note  Visit number: 18    Visit Type Performed: In-office     Keri Hilton presented today for a behavioral health visit.      SUBJECTIVE     Symptoms  Depression symptoms: fatigue/lack of energy and feelings of guilt  Anxiety symptoms: mild anxiety/worry  Additional reported symptoms: NA    OBJECTIVE     Mental Status Evaluation  Patient's mood and affect were consistent with the context, and consistent with their baseline: Yes   Comments:  Calm and pleasant; awake and alert; oriented to person, place, and time    Suicidal Ideation/Homicidal Ideation Risk Assessment: not assessed. If not assessed, reason: Patient disclosed improved symptomology since PHP program.      Plan for Safety-   N/A:  Risk is assessed to be minimal; therefore, developing a safety plan is not indicated at this time.    Interventions  Cognitive Behavior Therapy  We further processed fears of being alone or having to be independent if something were to happen to her  and the type of support she would receive from her children. We discussed plans to visit the local [place] and engage socially. We processed areas of conflict within her family system including with her siblings and sons.     Psychotropic medications: no known adherence challenges, effective     Current Outpatient Medications   Medication Sig Dispense Refill    ARIPiprazole (ABILIFY) 2 mg tablet Take 1 tablet (2 mg total) by mouth daily. 90 tablet 0    aspirin 81 mg enteric coated tablet Take 81 mg by mouth daily.      atorvastatin (LIPITOR) 80 mg tablet Take 80 mg by mouth daily.       busPIRone (BUSPAR) 10 mg tablet Take 2 tablets (20 mg total) by mouth 3 (three) times a day. 540 tablet 0    diphenhydrAMINE (BENADRYL) 25 mg capsule Take 25 mg by mouth nightly as needed for sleep.      gabapentin (NEURONTIN) 600 mg tablet TAKE 1 TABLET BY MOUTH TWICE A  tablet 1    lisinopriL  "(PRINIVIL) 5 mg tablet Take 2.5 mg by mouth once daily.       No current facility-administered medications for this visit.       ASSESSMENT       Progress  Patient's progress toward their goals is generally improving as Patient further processed narratives impacting her beliefs toward conflicts in interpersonal relationships.   Patient's symptomology has gradually improving. Patient continues to disclose improvement in being able to manage her emotions and denied significant moments of anxiety this past week. Patient expressed that she is \"not feeling myself\" but she recognizes this may be a positive result of the medications as she does not experience significant shifts in mood.     PLAN     Goals:  Development of skills for management of nonepileptic seizures    -Patient hopes to be able to return to driving     Recommendations  Individual Therapy  45 minutes weekly       Next visit plan:  Further discuss fear of driving and develop plan for returning to this    I spent 45 minutes on this date of service performing the following activities: providing counseling and education.  "

## 2023-09-28 ENCOUNTER — OFFICE VISIT (OUTPATIENT)
Dept: FAMILY MEDICINE | Facility: CLINIC | Age: 73
End: 2023-09-28
Payer: COMMERCIAL

## 2023-09-28 VITALS
HEART RATE: 71 BPM | BODY MASS INDEX: 25.16 KG/M2 | HEIGHT: 65 IN | OXYGEN SATURATION: 96 % | WEIGHT: 151 LBS | DIASTOLIC BLOOD PRESSURE: 62 MMHG | TEMPERATURE: 97.4 F | RESPIRATION RATE: 16 BRPM | SYSTOLIC BLOOD PRESSURE: 100 MMHG

## 2023-09-28 DIAGNOSIS — R56.9 SEIZURE-LIKE ACTIVITY (CMS/HCC): ICD-10-CM

## 2023-09-28 DIAGNOSIS — I10 PRIMARY HYPERTENSION: ICD-10-CM

## 2023-09-28 DIAGNOSIS — Z00.00 MEDICARE ANNUAL WELLNESS VISIT, SUBSEQUENT: Primary | ICD-10-CM

## 2023-09-28 DIAGNOSIS — Z12.31 BREAST CANCER SCREENING BY MAMMOGRAM: ICD-10-CM

## 2023-09-28 DIAGNOSIS — Z12.11 COLON CANCER SCREENING: ICD-10-CM

## 2023-09-28 DIAGNOSIS — Z78.0 POST-MENOPAUSAL: ICD-10-CM

## 2023-09-28 DIAGNOSIS — E78.5 HYPERLIPIDEMIA, UNSPECIFIED HYPERLIPIDEMIA TYPE: ICD-10-CM

## 2023-09-28 PROBLEM — R60.0 BILATERAL LEG EDEMA: Status: RESOLVED | Noted: 2023-04-11 | Resolved: 2023-09-28

## 2023-09-28 PROBLEM — R21 SKIN RASH: Status: RESOLVED | Noted: 2023-07-24 | Resolved: 2023-09-28

## 2023-09-28 PROBLEM — R74.8 ELEVATED LIVER ENZYMES: Status: RESOLVED | Noted: 2022-08-22 | Resolved: 2023-09-28

## 2023-09-28 PROBLEM — R20.0 BILATERAL HAND NUMBNESS: Status: RESOLVED | Noted: 2023-04-11 | Resolved: 2023-09-28

## 2023-09-28 PROCEDURE — G0439 PPPS, SUBSEQ VISIT: HCPCS | Performed by: FAMILY MEDICINE

## 2023-09-28 RX ORDER — LISINOPRIL 2.5 MG/1
2.5 TABLET ORAL
COMMUNITY
Start: 2023-09-28 | End: 2024-01-08 | Stop reason: ALTCHOICE

## 2023-09-28 ASSESSMENT — ENCOUNTER SYMPTOMS
WHEEZING: 0
BACK PAIN: 0
CHILLS: 0
HEADACHES: 0
FEVER: 0
DIZZINESS: 0
NUMBNESS: 0
DYSURIA: 0
WEAKNESS: 0
PALPITATIONS: 0
FREQUENCY: 0
SHORTNESS OF BREATH: 0
COUGH: 0

## 2023-09-28 ASSESSMENT — PATIENT HEALTH QUESTIONNAIRE - PHQ9
SUM OF ALL RESPONSES TO PHQ9 QUESTIONS 1 & 2: 2
SUM OF ALL RESPONSES TO PHQ QUESTIONS 1-9: 6

## 2023-09-28 NOTE — PROGRESS NOTES
Subjective      Patient ID: Keri Hilton is a 73 y.o. female.  1950      HPI    Here today for a medicare wellness visit.  Following with psychiatrist regularly.  Scheduled for shingrix, flu and COVID at the pharmacy.    Overall Health  Over the past 4 weeks, how would you rate your health in general? Good    During the past four weeks, how much bodily pain have you generally had? Very mild pain    During the past four weeks, was someone available to help you if you needed and wanted help? Yes, as much as she wanted    How often do you have trouble taking medicines the way you have been told to take them? Always takes as prescribed.     Cognitive screening?  5/5     Depression Screening  1. Over the past two weeks, have you felt down, depressed, or hopeless? Yes  2.   Over the past two weeks, have you felt little interest or pleasure in doing things? Yes    Functional Ability/Safety Screening  1. Was the patient's timed up and go test longer than 30 seconds? No  a. Have you fallen 2 or more times in the past year? Yes  b. Are you afraid of falling? Yes  2. Can you get places out of walking distance without help? For example can you travel alone by bus, taxi, or drive your own car? Yes  3. Can you shop for groceries or clothes without help? Yes  4. Can you prepare your own meals? Yes  5. Can you do your own housework without help? Yes  6. Can you handle your own money without help?Yes  7. Do you need help eating, bathing, dressing, or getting around your home? No  8. Does the patient's home have rugs in the hallway, lack grab bars in the bathroom, lack handrails on the stairs or have poor lighting? No  9. Has the patient noticed any hearing difficulties? No      Screening Exams  1. Mammogram? Ordered  2. Colonoscopy? UTD  3. DEXA? Ordered  4. Pneumovax?   5. Shingrix?Getting at the pharmacy  6. Flu? Getting at the pharmacy    Advance Care Directive?  yes    Education, counseling, and referral for any  "pertinent health issues identified.        The following have been reviewed and updated as appropriate in this visit:   Tobacco  Allergies  Meds  Problems  Med Hx  Surg Hx  Fam Hx  Soc   Hx      Review of Systems   Constitutional: Negative for chills and fever.   Eyes: Negative for visual disturbance.   Respiratory: Negative for cough, shortness of breath and wheezing.    Cardiovascular: Negative for chest pain, palpitations and leg swelling.   Genitourinary: Negative for dysuria, frequency and urgency.   Musculoskeletal: Negative for back pain.   Skin: Negative for rash.   Neurological: Negative for dizziness, weakness, numbness and headaches.       Objective     Vitals:    09/28/23 1433   BP: 100/62   BP Location: Left upper arm   Patient Position: Sitting   Pulse: 71   Resp: 16   Temp: 36.3 °C (97.4 °F)   TempSrc: Temporal   SpO2: 96%   Weight: 68.5 kg (151 lb)   Height: 1.645 m (5' 4.75\")     Body mass index is 25.32 kg/m².    Physical Exam  Vitals and nursing note reviewed.   Constitutional:       General: She is not in acute distress.     Appearance: Normal appearance. She is well-developed. She is not ill-appearing.   Cardiovascular:      Rate and Rhythm: Normal rate and regular rhythm.      Heart sounds: Normal heart sounds. No murmur heard.     No friction rub.   Pulmonary:      Effort: Pulmonary effort is normal. No respiratory distress.      Breath sounds: Normal breath sounds. No stridor. No wheezing or rhonchi.   Musculoskeletal:         General: Normal range of motion.   Neurological:      Mental Status: She is alert.         Assessment/Plan   Diagnoses and all orders for this visit:    Medicare annual wellness visit, subsequent (Primary)    Primary hypertension  Assessment & Plan:  Stable on medication.      Hyperlipidemia, unspecified hyperlipidemia type  Assessment & Plan:  Continue medication. Has script to get repeat labs done.      Seizure-like activity (CMS/[facility])  Assessment & " Plan:  Following with psychiatrist regularly.      Breast cancer screening by mammogram  -     BI SCREENING MAMMOGRAM BILATERAL(TOMOSYNTHESIS); Future    Post-menopausal  -     DEXA BONE DENSITY; Future    Colon cancer screening  -     Direct Access Colonoscopy [facility] Lasha Buckner/Yoav Girard     Keri Hilton is a 73 y.o. female who presents for a subsequent annual wellness visit.     Patient Care Team:  Richard Castro MD as PCP - General (Family Medicine)  Nithya Mac RN as Registered Nurse  Shahzad Garcia MD as Referring Physician (Cardiology)    Comprehensive Medical and Social History  Patient Active Problem List   Diagnosis    Hyperlipidemia    Hypertension    Balance disorder    Neuropathy    Medicare annual wellness visit, subsequent    Lumbar back pain    Seizure-like activity (CMS/HCC)    Coronary artery disease     Past Medical History:   Diagnosis Date    Anxiety     Bipolar disorder (CMS/HCC)     Depression     Hypertension     MI (myocardial infarction) (CMS/HCC)      Past Surgical History:   Procedure Laterality Date    ANKLE SURGERY Left     s/p trauma     SECTION      CORONARY ANGIOPLASTY WITH STENT PLACEMENT       Allergies   Allergen Reactions    Shellfish Containing Products Nausea And Vomiting    Shellfish Derived      Current Outpatient Medications   Medication Sig Dispense Refill    ARIPiprazole (ABILIFY) 2 mg tablet Take 1 tablet (2 mg total) by mouth daily. 90 tablet 0    aspirin 81 mg enteric coated tablet Take 81 mg by mouth daily.      atorvastatin (LIPITOR) 80 mg tablet Take 80 mg by mouth daily.       busPIRone (BUSPAR) 10 mg tablet Take 2 tablets (20 mg total) by mouth 3 (three) times a day. 540 tablet 0    diphenhydrAMINE (BENADRYL) 25 mg capsule Take 25 mg by mouth nightly as needed for sleep.      gabapentin (NEURONTIN) 600 mg tablet TAKE 1 TABLET BY MOUTH TWICE A  tablet 1    lisinopriL (PRINIVIL) 2.5 mg tablet  "Take 1 tablet (2.5 mg total) by mouth once daily.       No current facility-administered medications for this visit.     Social History     Tobacco Use    Smoking status: Former     Types: Cigarettes     Quit date: 2016     Years since quittin.7    Smokeless tobacco: Never   Vaping Use    Vaping Use: Every day    Substances: Nicotine, Flavoring    Devices: Disposable, Pre-filled or refillable cartridge, Refillable tank, Pre-filled pod   Substance Use Topics    Alcohol use: Not Currently     Alcohol/week: 1.0 standard drink of alcohol     Types: 1 Glasses of wine per week     Comment: at [place]    Drug use: Never     Family History   Problem Relation Age of Onset    Cervical cancer Biological Mother     Heart disease Biological Father     Stroke Biological Father     Prostate cancer Biological Father     No Known Problems Biological Sister     Prostate cancer Biological Brother     Colon cancer Maternal Grandmother     Heart disease Maternal Grandfather     Diabetes Paternal Grandmother     Lung cancer Paternal Grandfather     Breast cancer Cousin     Colon cancer Other        Objective   Vitals  Vitals:    23 1433   BP: 100/62   BP Location: Left upper arm   Patient Position: Sitting   Pulse: 71   Resp: 16   Temp: 36.3 °C (97.4 °F)   TempSrc: Temporal   SpO2: 96%   Weight: 68.5 kg (151 lb)   Height: 1.645 m (5' 4.75\")     Body mass index is 25.32 kg/m².    Advanced Care Plan                                        PHQ  Will the patient answer the depression questions?: Yes   Little interest or pleasure in doing things: Several days   Feeling down, depressed, or hopeless: Several days   Depression Risk: 2   Trouble falling or staying asleep, or sleeping too much: More than half the days   Feeling tired or having little energy: Not at all   Poor appetite or overeating: Not at all   Feeling bad about yourself - or that you are a failure or have let yourself or your family down: Several " days   Trouble concentrating on things, such as reading the newspaper or watching television: Several days   Moving or speaking so slowly that other people could have noticed? Or the opposite - being so fidgety or restless that you have been moving around a lot more than usual.: Not at all   Thoughts that you would be better off dead or hurting yourself in some way: Not at all   Depression Risk Score: 6   If You Checked Off Any Problems, How Difficult Have These Problems Made It For You to Do Your Work, Take Care of Things at Home, or Get Along with Other People?: somewhat difficult   PHQ-9 interpretation: PHQ result may indicate mild depression     Mini Cog         Get Up and Go  Result: Pass    STEADI Falls Risk                                                                Hearing and Vision Screening  No results found.  See HRA for relevant hearing screening response.    Assessment/Plan   Diagnoses and all orders for this visit:    Medicare annual wellness visit, subsequent (Primary)    Primary hypertension  Assessment & Plan:  Stable on medication.      Hyperlipidemia, unspecified hyperlipidemia type  Assessment & Plan:  Continue medication. Has script to get repeat labs done.      Seizure-like activity (CMS/HCC)  Assessment & Plan:  Following with psychiatrist regularly.      Breast cancer screening by mammogram  -     BI SCREENING MAMMOGRAM BILATERAL(TOMOSYNTHESIS); Future    Post-menopausal  -     DEXA BONE DENSITY; Future    Colon cancer screening  -     Direct Access Colonoscopy [facility] Lasha Buckner/Yoav      See Patient Instructions (the written plan) which was given to the patient for PPPS and health risk factors with interventions.

## 2023-09-28 NOTE — PATIENT INSTRUCTIONS
Your Personalized Prevention Plan Services (PPPS)    Preventive Services Checklist (Assumes Average Risk Unless Otherwise Noted):    Health Maintenance Topics with due status: Overdue       Topic Date Due    Depression Screening Never done    Hepatitis C Screening Never done    Zoster Vaccine Never done    Pneumococcal (65 years and older) 03/05/2020    DTaP, Tdap, and Td Vaccines 12/08/2021    COVID-19 Vaccine 02/03/2022    Breast Cancer Screening 04/28/2023    Influenza Vaccine 08/01/2023    Medicare Annual Wellness Visit 08/22/2023    DEXA Scan 09/07/2023     Health Maintenance Topics with due status: Not Due       Topic Last Completion Date    Colorectal Cancer Screening 05/14/2019    Falls Risk Screening 09/24/2023     Health Maintenance Topics with due status: Aged Out       Topic Date Due    Meningococcal ACWY Aged Out    HIB Vaccines Aged Out    Hepatitis B Vaccines Aged Out    IPV Vaccines Aged Out    HPV Vaccines Aged Out       You May Be Eligible for These Additional Preventive Services   (Assumes Average Risk Unless Otherwise Noted)  Diabetes Screening Any 1 risk factor: hypertension, dyslipidemia, obesity, high glucose; or Any 2 risk factors: >=64yo, overweight, family history diabetes (covered every 6 months)   Hepatitis C Screening Any 1 risk factor: 1) blood transfusion before 1992,   2) current or past injection drug use (annually for high risk; if born between 0331-3936, see above for status).   Vaccine: Hepatitis B As necessary if at-risk: hemophilia, ESRD, diabetes, living with individual infected with hep B, healthcare worker with frequent contact with blood/bodily fluids (series covered once)   Sexually Transmitted Diseases (STDs) As necessary chlamydia, gonorrhea, syphilis, hepatitis B (covered annually)  HIV if any 1 risk factor present: 1) <14yo or >64yo and at increased risk or 2) 15-64yo and ask for it (covered annually)   Lung Cancer Screening Low dose chest CT if  all three risk factors: 1) 50-78yo, 2) smoker or quit within last 15y, 3) >=20 pack years (covered annually).  No results found for this or any previous visit.       Cholesterol Screening Both risk factors: 1) >=19yo and 2)  increased risk coronary artery disease (covered every 5 years).     Breast Cancer Screening Covered once 35-40yo, annually >=41yo (if >=49yo, see above for status).         Health Risk Factors with Personalized Education:  ----------------------------------------------------------------------------------------------------------------------  Stress management:  Understanding Your Stress  · Think about how you know when youre stressed.  · Think about how your thoughts or behaviors are different when youre stressed.  · Think about what triggers stress for you.  · Think about how you handle stress, and whether youre making unhealthy choices in response to stress (like smoking, drinking alcohol or overeating).  Managing Stress  · Take a break from the stressful situation.  · Reduce your stress levels by exercising, talking with family/friends, ensuring adequate sleep.  · Consider meditation or yoga.  · Make sure you plan time to do things you enjoy.  · Let your PCP know if youre having problems limiting your stress.  ----------------------------------------------------------------------------------------------------------------------  Controlling Your Blood Pressure  · Maintain a normal weight (body mass index between 18.5 and 24.9).  · Eat more fruit, vegetables and low-fat dairy.  · Eat less saturated fat and total fat.  · Lower your sodium (salt) intake.  Try to stay under 1500 mg per day, but if you cannot get your intake to be that low, at least lower it by 1000 mg.  · Stay active.  Try to get at least 90 to 150 minutes of exercise per week.  Try brisk walking, swimming, bicycling or dancing.  · Limit alcohol intake.  When you do consume alcohol, drink no more than 1 drink per day.  · If you  have been prescribed medication, take it regularly and exactly as prescribed.  Let your PCP know if you have any problems or questions about your medication.  · Check your blood pressure at home or at the store.  Write down your readings and share them with your PCP  ----------------------------------------------------------------------------------------------------------------------  Controlling Your Cholesterol  · Reduce the amount of saturated and trans fat in your diet.  Limit intake of red meat.  Consume only low-fat or non-fat/skim dairy.  Limit fried food.  Cook with vegetable oils.  · Reduce your intake of sugary foods, sugary drinks and alcohol.  · Eat a diet high in fruit, vegetables and whole grains.  · Get protein from fish, poultry and a small portion of nuts.  · Stay active.  Try to get at least 90 to 150 minutes of exercise per week.  Try brisk walking, swimming, bicycling or dancing.  · Maintain a healthy weight by balancing your diet and exercise.  · If you have been prescribed medication, take it regularly and exactly as prescribed. Let your PCP know if you have any problems or questions about your medication.  · Its important to know your cholesterol numbers.  When recommended by your PCP, get the cholesterol blood test.  ----------------------------------------------------------------------------------------------------------------------  Maintaining Strong Bones  · Try to get at least 90 to 150 minutes of weight-bearing exercise per week.  · Ensure intake of at least 1200mg of calcium per day.  Eat foods high in calcium like milk and other dairy, green vegetables, fruit, canned fish with soft and edible bones, nuts, calcium-set tofu.  Some foods are calcium-fortified, like bread, cereal, fruit juices and mineral water.  · Help your body make vitamin D by getting 10-15 minutes per day of sunlight.    · Ensure intake of at least 600IU of vitamin D per day.  Eat foods high in vitamin D like oily  fish (salmon, sardines, mackerel) and eggs.  Some foods are fortified with vitamin D, like dairy and cereals.  · Avoid high amounts of caffeine and salt, since they can cause the body to loose calcium.  · Limit alcohol intake, since it is associated with weaker bones and is associated with falls and fractures.  · Limit intake of fizzy drinks.  ----------------------------------------------------------------------------------------------------------------------  Reducing Your Risk of Falls  · Tell your PCP if any of your medications make you feel tired, dizzy, lightheaded or off-balance.  · Maintain coordination, flexibility and balance by ensuring regular physical activity.  · Limit alcohol intake to 1 drink per day.  Consider avoiding all alcohol intake.  · Ensure good vision.  Visit an ophthalmologist or optometrist regularly for vision screening or to make sure your glasses / contact lens prescription is correct.  If you need glasses or contacts, wear them.  When you get new glasses or contacts, take time to get used to them.  Do not wear sunglasses or tinted lenses when indoors.  · Ensure good hearing.  Have your hearing checked if you are having trouble hearing, or family and friends think you cannot hear them.  If you need a hearing aid, be sure it fits well and wear it.  · Get enough rest.  Ensure about 7-9 hours of sleep every day.  · Get up slowly from your bed or chairs.  Do not start walking until you are sure you feel steady.  · Wear non-skid, rubber-soled, low-heeled shoes.  Do not walk in socks, or in shoes and slippers with smooth soles.  · If your PCP or therapist recommends using a cane or walker, use it regularly.  · Make your home safer.  Increase lighting throughout the house, especially at the top and bottom of stairs.  Ensure lighting is easily turned on when getting up in the middle of the night.  Make sure there are two secure rails on all stairs.  Install grab bars in the bathtub / shower  and near the toilet.  Consider using a shower chair and / or a hand-held shower.  · Spread sand or salt on icy surfaces.  Beware of wet surfaces, which can be icy.  · Tell your PCP if you have fallen.

## 2023-10-02 ENCOUNTER — OFFICE VISIT (OUTPATIENT)
Dept: BEHAVIORAL HEALTH | Facility: CLINIC | Age: 73
End: 2023-10-02
Payer: COMMERCIAL

## 2023-10-02 DIAGNOSIS — F41.9 ANXIETY: Primary | ICD-10-CM

## 2023-10-02 PROCEDURE — 90834 PSYTX W PT 45 MINUTES: CPT

## 2023-10-02 NOTE — PROGRESS NOTES
[facility] - Psychotherapy Follow-up Visit Note  Visit number: 19    Visit Type Performed: In-office     Keri Hilton presented today for a behavioral health visit.      SUBJECTIVE     Symptoms  Depression symptoms: anhedonia, lack of motivation and fatigue/lack of energy  Anxiety symptoms: mild anxiety/worry  Additional reported symptoms: NA    OBJECTIVE     Mental Status Evaluation  Patient's mood and affect were consistent with the context, and consistent with their baseline: Yes   Comments:  Calm and pleasant; awake and alert; oriented to person, place, and time    Suicidal Ideation/Homicidal Ideation Risk Assessment: not assessed. If not assessed, reason: Patient disclosed improved symptomology since PHP program.      Plan for Safety-   N/A:  Risk is assessed to be minimal; therefore, developing a safety plan is not indicated at this time.    Interventions  Cognitive Behavior Therapy  We processed increased irritability with her partner. We identified how her 's mental health may be creating a negative environment in the household leading to Patient's depressive symptoms including being short with her . We acknowledged what areas require accountability and change on Patient's part. We processed her beliefs regarding her 's ability to change and what steps Patient may need to take to improve her circumstances such as joining a book club or returning to driving. We started to discuss that Patient's primary fear with driving is that she will have a seizure. We discussed how mood and stress level check ins may help with this, but Therapist additionally acknowledged the uncertainty of knowing for a fact how Patient will do when driving.     Psychotropic medications: no known adherence challenges, effective     Current Outpatient Medications   Medication Sig Dispense Refill    ARIPiprazole (ABILIFY) 2 mg tablet Take 1 tablet (2 mg total) by mouth daily. 90 tablet 0     aspirin 81 mg enteric coated tablet Take 81 mg by mouth daily.      atorvastatin (LIPITOR) 80 mg tablet Take 80 mg by mouth daily.       busPIRone (BUSPAR) 10 mg tablet Take 2 tablets (20 mg total) by mouth 3 (three) times a day. 540 tablet 0    diphenhydrAMINE (BENADRYL) 25 mg capsule Take 25 mg by mouth nightly as needed for sleep.      gabapentin (NEURONTIN) 600 mg tablet TAKE 1 TABLET BY MOUTH TWICE A  tablet 1    lisinopriL (PRINIVIL) 2.5 mg tablet Take 1 tablet (2.5 mg total) by mouth once daily.       No current facility-administered medications for this visit.       ASSESSMENT       Progress  Patient's progress toward their goals is generally improving as Patient processed factors influencing current negative symptoms. We additionally started to challenge the fear of having a seizure while driving.   Patient's symptomology has gradually improving.  However, Patient continues to report feeling flat and additionally shared depressive symptoms including irritability, anhedonia and apathy.     PLAN     Goals:  Development of skills for management of nonepileptic seizures    -Patient hopes to be able to return to driving     Recommendations  Individual Therapy  45 minutes weekly     Next visit plan:  Develop SMART goals related to getting back to driving    I spent 45 minutes on this date of service performing the following activities: providing counseling and education.

## 2023-10-18 ENCOUNTER — TELEPHONE (OUTPATIENT)
Dept: PSYCHIATRY | Facility: HOSPITAL | Age: 73
End: 2023-10-18
Payer: COMMERCIAL

## 2023-10-19 NOTE — TELEPHONE ENCOUNTER
Called Keri CHASE informing her that labs have been resubmitted and she can go to any lab esteban to have drawn.  Also reminded that labs are fasting.  Left detailed contact info should she have additional calls.

## 2023-10-23 ENCOUNTER — OFFICE VISIT (OUTPATIENT)
Dept: BEHAVIORAL HEALTH | Facility: CLINIC | Age: 73
End: 2023-10-23
Payer: COMMERCIAL

## 2023-10-23 DIAGNOSIS — F41.9 ANXIETY: Primary | ICD-10-CM

## 2023-10-23 PROCEDURE — 90834 PSYTX W PT 45 MINUTES: CPT

## 2023-10-23 NOTE — PROGRESS NOTES
"[facility] - Psychotherapy Follow-up Visit Note  Visit number: 20    Visit Type Performed: In-office     Keri Hilton presented today for a behavioral health visit.      SUBJECTIVE     Symptoms  Depression symptoms: anhedonia, lack of motivation, insomnia, fatigue/lack of energy and feelings of guilt  Anxiety symptoms: moderate anxiety/worry, difficulty controlling worry and irritability  Additional reported symptoms: NA    OBJECTIVE     Mental Status Evaluation  Patient's mood and affect were consistent with the context, and consistent with their baseline: Yes   Comments:  Calm and pleasant; awake and alert; oriented to person, place, and time    Suicidal Ideation/Homicidal Ideation Risk Assessment: not assessed. If not assessed, reason: Pt did not endorse any changes in risk or protective factors and none were observed.      Plan for Safety-   N/A:  Risk is assessed to be minimal; therefore, developing a safety plan is not indicated at this time.    Interventions  Cognitive Behavior Therapy and Communication Skills  We processed Patient's recent visit to [place] to see her youngest son and his children which was worsened by a \"lecture\" regarding Patient's financial situation. We discussed how to communicate her feelings to her son about these conversations and the barriers for addressing his concerns at this time. We processed continued sleep difficulty, specifically staying asleep at night, and discussed behavioral techniques to assist with this.     Psychotropic medications: no known adherence challenges, effective     Current Outpatient Medications   Medication Sig Dispense Refill    ARIPiprazole (ABILIFY) 2 mg tablet Take 1 tablet (2 mg total) by mouth daily. 90 tablet 0    aspirin 81 mg enteric coated tablet Take 81 mg by mouth daily.      atorvastatin (LIPITOR) 80 mg tablet Take 80 mg by mouth daily.       busPIRone (BUSPAR) 10 mg tablet Take 2 tablets (20 mg total) by mouth 3 (three) " times a day. 540 tablet 0    diphenhydrAMINE (BENADRYL) 25 mg capsule Take 25 mg by mouth nightly as needed for sleep.      gabapentin (NEURONTIN) 600 mg tablet TAKE 1 TABLET BY MOUTH TWICE A  tablet 1    lisinopriL (PRINIVIL) 2.5 mg tablet Take 1 tablet (2.5 mg total) by mouth once daily.       No current facility-administered medications for this visit.       ASSESSMENT       Progress  Patient's progress toward their goals is generally improving as Patient processed continued conflict within her family system that worsens anxiety symptoms.    Patient's symptomology has gradually improving.  However, Patient continues to report feeling flat and additionally continues to share depressive symptoms including irritability, anhedonia and apathy.     PLAN     Goals:  Development of skills for management of nonepileptic seizures    -Patient hopes to be able to return to driving     Recommendations  Individual Therapy  45 minutes weekly     Next visit plan:  Develop SMART goals related to getting back to driving when able    I spent 45 minutes on this date of service performing the following activities: providing counseling and education.

## 2023-10-29 DIAGNOSIS — G62.9 NEUROPATHY: ICD-10-CM

## 2023-10-30 RX ORDER — GABAPENTIN 600 MG/1
600 TABLET ORAL 2 TIMES DAILY
Qty: 180 TABLET | Refills: 1 | Status: SHIPPED | OUTPATIENT
Start: 2023-10-30 | End: 2024-01-26 | Stop reason: SDUPTHER

## 2023-11-02 ENCOUNTER — TELEPHONE (OUTPATIENT)
Dept: PSYCHIATRY | Facility: CLINIC | Age: 73
End: 2023-11-02

## 2023-11-03 LAB
BUN SERPL-MCNC: 10 MG/DL (ref 8–27)
BUN/CREAT SERPL: 18 (ref 12–28)
CALCIUM SERPL-MCNC: 9.2 MG/DL (ref 8.7–10.3)
CHLORIDE SERPL-SCNC: 105 MMOL/L (ref 96–106)
CHOLEST SERPL-MCNC: 118 MG/DL (ref 100–199)
CO2 SERPL-SCNC: 26 MMOL/L (ref 20–29)
CREAT SERPL-MCNC: 0.57 MG/DL (ref 0.57–1)
EGFRCR SERPLBLD CKD-EPI 2021: 97 ML/MIN/1.73
GLUCOSE SERPL-MCNC: 109 MG/DL (ref 70–99)
HDLC SERPL-MCNC: 39 MG/DL
LDLC SERPL CALC-MCNC: 51 MG/DL (ref 0–99)
POTASSIUM SERPL-SCNC: 4 MMOL/L (ref 3.5–5.2)
SODIUM SERPL-SCNC: 142 MMOL/L (ref 134–144)
TRIGL SERPL-MCNC: 168 MG/DL (ref 0–149)
VLDLC SERPL CALC-MCNC: 28 MG/DL (ref 5–40)

## 2023-11-06 ENCOUNTER — OFFICE VISIT (OUTPATIENT)
Dept: PSYCHIATRY | Facility: HOSPITAL | Age: 73
End: 2023-11-06
Attending: PSYCHIATRY & NEUROLOGY
Payer: COMMERCIAL

## 2023-11-06 ENCOUNTER — OFFICE VISIT (OUTPATIENT)
Dept: BEHAVIORAL HEALTH | Facility: CLINIC | Age: 73
End: 2023-11-06
Payer: COMMERCIAL

## 2023-11-06 DIAGNOSIS — F41.9 ANXIETY: Primary | ICD-10-CM

## 2023-11-06 DIAGNOSIS — F41.1 GENERALIZED ANXIETY DISORDER: Primary | ICD-10-CM

## 2023-11-06 DIAGNOSIS — F32.A DEPRESSIVE DISORDER: ICD-10-CM

## 2023-11-06 PROCEDURE — 99214 OFFICE O/P EST MOD 30 MIN: CPT | Performed by: PSYCHIATRY & NEUROLOGY

## 2023-11-06 PROCEDURE — 90834 PSYTX W PT 45 MINUTES: CPT

## 2023-11-06 RX ORDER — BUSPIRONE HYDROCHLORIDE 10 MG/1
20 TABLET ORAL 3 TIMES DAILY
Qty: 540 TABLET | Refills: 0 | Status: SHIPPED | OUTPATIENT
Start: 2023-11-06 | End: 2024-01-08 | Stop reason: SDUPTHER

## 2023-11-06 RX ORDER — ARIPIPRAZOLE 2 MG/1
2 TABLET ORAL DAILY
Qty: 90 TABLET | Refills: 0 | Status: SHIPPED | OUTPATIENT
Start: 2023-11-06 | End: 2024-01-08 | Stop reason: SDUPTHER
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
## 2023-11-07 NOTE — PROGRESS NOTES
"[facility] - Psychotherapy Follow-up Visit Note  Visit number: 21    Visit Type Performed: In-office     Keri Hilton presented today for a behavioral health visit.      SUBJECTIVE     Symptoms  Depression symptoms: anhedonia, lack of motivation, insomnia, fatigue/lack of energy and feelings of guilt  Anxiety symptoms: mild anxiety/worry, restless/\"on edge\" and irritability  Additional reported symptoms: NA     OBJECTIVE     Mental Status Evaluation  Patient's mood and affect were consistent with the context, and consistent with their baseline: Yes   Comments:  Low mood, calm and pleasant; awake and alert; oriented to person, place, and time    Suicidal Ideation/Homicidal Ideation Risk Assessment: not assessed. If not assessed, reason: Pt did not endorse any changes in risk or protective factors and none were observed.      Plan for Safety-   N/A:  Risk is assessed to be minimal; therefore, developing a safety plan is not indicated at this time.    Interventions  Cognitive Behavior Therapy and Communication Skills  We processed recommendations from her psychiatry visit today to include exercise and light therapy to Patient's daily routine to help combat symptoms of depression. We processed Patient's efforts to voice her frustration to her youngest son and prepared for subsequent conversations about her plan to address their shared concerns. We processed a positive conversation with her  today where she may be seeing motivation for change regarding some of their current stressors.      Psychotropic medications: no known adherence challenges, effective     Current Outpatient Medications   Medication Sig Dispense Refill    ARIPiprazole (ABILIFY) 2 mg tablet Take 1 tablet (2 mg total) by mouth daily. 90 tablet 0    aspirin 81 mg enteric coated tablet Take 81 mg by mouth daily.      atorvastatin (LIPITOR) 80 mg tablet Take 80 mg by mouth daily.       busPIRone (BUSPAR) 10 mg tablet Take 2 " tablets (20 mg total) by mouth 3 (three) times a day. 540 tablet 0    diphenhydrAMINE (BENADRYL) 25 mg capsule Take 25 mg by mouth nightly as needed for sleep.      gabapentin (NEURONTIN) 600 mg tablet Take 1 tablet (600 mg total) by mouth 2 (two) times a day. 180 tablet 1    lisinopriL (PRINIVIL) 2.5 mg tablet Take 1 tablet (2.5 mg total) by mouth once daily.       No current facility-administered medications for this visit.       ASSESSMENT       Progress  Patient's progress toward their goals is generally improving as Patient processed communication efforts with several people in her life and discussed strategies for depression management.   Patient's symptomology has gradually improving.  However, Patient continues to report feeling flat and additionally continues to share depressive symptoms including irritability, anhedonia and apathy.     PLAN     Goals:  Development of skills for management of nonepileptic seizures    -Patient hopes to be able to return to driving     Recommendations  Individual Therapy  45 minutes weekly     Next visit plan:  Develop SMART goals related to getting back to driving when able as Patient is waiting until 6 months seizure free to return to driving    I spent 45 minutes on this date of service performing the following activities: providing counseling and education.

## 2023-11-08 ENCOUNTER — TELEPHONE (OUTPATIENT)
Dept: PSYCHIATRY | Facility: CLINIC | Age: 73
End: 2023-11-08

## 2023-11-08 NOTE — TELEPHONE ENCOUNTER
SPOKE TO CLIENT AND SHE HAS MOVED OUT OF OUR AREA. CLT IS GETTING MED MANAGEMENT THROUGH ANOTHER PROVIDER. IB MESSAGE SENT TO DR. Shaggy Zacarias TO COMPLETE DISCHARGE.

## 2023-11-13 ENCOUNTER — DOCUMENTATION (OUTPATIENT)
Dept: PSYCHIATRY | Facility: CLINIC | Age: 73
End: 2023-11-13

## 2023-11-13 ENCOUNTER — OFFICE VISIT (OUTPATIENT)
Dept: BEHAVIORAL HEALTH | Facility: CLINIC | Age: 73
End: 2023-11-13
Payer: COMMERCIAL

## 2023-11-13 DIAGNOSIS — F41.9 ANXIETY: Primary | ICD-10-CM

## 2023-11-13 PROCEDURE — 90834 PSYTX W PT 45 MINUTES: CPT

## 2023-11-13 NOTE — PROGRESS NOTES
"[facility] - Psychotherapy Follow-up Visit Note  Visit number: 22    Visit Type Performed: In-office     Keri Hilton presented today for a behavioral health visit.      SUBJECTIVE     Symptoms  Depression symptoms: anhedonia, lack of motivation, insomnia, fatigue/lack of energy and feelings of guilt  Anxiety symptoms: mild anxiety/worry, restless/\"on edge\" and irritability  Additional reported symptoms: \"shaky/wobbly\" legs (rule out medical, but could be psychosomatic)     OBJECTIVE     Mental Status Evaluation  Patient's mood and affect were consistent with the context, and consistent with their baseline: Yes   Comments:  Calm and pleasant; awake and alert; oriented to person, place, and time    Suicidal Ideation/Homicidal Ideation Risk Assessment: not assessed. If not assessed, reason: Pt did not endorse any changes in risk or protective factors and none were observed.      Plan for Safety-   N/A:  Risk is assessed to be minimal; therefore, developing a safety plan is not indicated at this time.    Interventions  Behavior Management and Cognitive Behavior Therapy  We processed the impact masking and behaving where she \"always had a smile on my face\" prior to the seizures has had on Patient's idea of happiness in her life. We discussed the benefits of behavioral activation to help combat symptoms of depression and phsyiological barriers to certain activities. We processed the importance of letting go of the past so Patient can be actively engaged in her life now in order to make decisions about her future. We discussed overwhelm with all the things Patient and her  need to do and finding ways to prioritize their goals into smaller and more manageable steps.     Psychotropic medications: no known adherence challenges, effective     Current Outpatient Medications   Medication Sig Dispense Refill    ARIPiprazole (ABILIFY) 2 mg tablet Take 1 tablet (2 mg total) by mouth daily. 90 tablet 0 "    aspirin 81 mg enteric coated tablet Take 81 mg by mouth daily.      atorvastatin (LIPITOR) 80 mg tablet Take 80 mg by mouth daily.       busPIRone (BUSPAR) 10 mg tablet Take 2 tablets (20 mg total) by mouth 3 (three) times a day. 540 tablet 0    diphenhydrAMINE (BENADRYL) 25 mg capsule Take 25 mg by mouth nightly as needed for sleep.      gabapentin (NEURONTIN) 600 mg tablet Take 1 tablet (600 mg total) by mouth 2 (two) times a day. 180 tablet 1    lisinopriL (PRINIVIL) 2.5 mg tablet Take 1 tablet (2.5 mg total) by mouth once daily.       No current facility-administered medications for this visit.       ASSESSMENT       Progress  Patient's progress toward their goals is generally improving as Patient discussed the importance of developing SMART goals to become actively engaged in the next parts of her life.    Patient's symptomology has gradually improving.  However, Patient continues to report feeling flat and additionally continues to share depressive symptoms including irritability, anhedonia and apathy.     PLAN     Goals:  Development of skills for management of nonepileptic seizures    -Patient hopes to be able to return to driving     Recommendations  Individual Therapy  45 minutes weekly     Next visit plan:  Develop SMART goals related to getting back to driving when able as Patient is waiting until 6 months seizure free to return to driving    I spent 45 minutes on this date of service performing the following activities: providing counseling and education.

## 2023-11-14 NOTE — PSYCH
Psychiatric Discharge Summary     Discharge Date: 11/13/2023    Discharge Diagnosis: Bipolar 1 D/o- most recent episode depressed, 2. Tobacco Use disorder, 3. H/o alcoholism    Treating Physician: CHANI Duran      Presenting Problems/Pertinent Findings: Please see initial Psychiatric Assessment Evaluation       Course of Treatment:  Patient was seen by prior provider Milla Adrian with most recent visit on 12/29/2022. She has subsequently moved out of the area and is being administratively discharged from clinic. Criteria for Discharge: Withdrew from Treatment    Aftercare Recommendations: Follow up with pcp    Discharge Medications:   Current Outpatient Medications:     aspirin (ECOTRIN LOW STRENGTH) 81 mg EC tablet, Take 81 mg by mouth daily, Disp: , Rfl:     atorvastatin (LIPITOR) 80 mg tablet, Take 80 mg by mouth daily, Disp: , Rfl:     busPIRone (BUSPAR) 10 mg tablet, Take 1 tablet (10 mg total) by mouth 3 (three) times a day Take 2 PO TID, Disp: 540 tablet, Rfl: 1    lamoTRIgine (LaMICtal) 200 MG tablet, Take 1 tablet (200 mg total) by mouth daily at bedtime Take 1 PO BID, Disp: 180 tablet, Rfl: 1    lisinopril (ZESTRIL) 5 mg tablet, Take 5 mg by mouth daily, Disp: , Rfl: 3    polyethylene glycol (COLYTE) 4000 mL solution, As directed, Disp: 4000 mL, Rfl: 0       Mental Status at Time of most recent visit on 12/29/2022 by Milla Adrian.      Mental status:  Appearance calm and cooperative , adequate hygiene and grooming and good eye contact    Mood mood appropriate   Affect affect appropriate    Speech a normal rate and fluent   Thought Processes normal thought processes   Hallucinations no hallucinations present    Thought Content no delusions   Abnormal Thoughts no suicidal thoughts  and no homicidal thoughts    Orientation  oriented to person and place and time   Remote Memory short term memory intact and long term memory intact   Attention Span concentration intact   Intellect Appears to be of Average Intelligence   Insight Insight intact   Judgement judgment was intact   Muscle Strength Muscle strength and tone were normal and Normal gait    Language no difficulty naming common objects   Fund of Knowledge displays adequate knowledge of current events   Pain none   Pain Scale 0

## 2023-11-20 ENCOUNTER — OFFICE VISIT (OUTPATIENT)
Dept: BEHAVIORAL HEALTH | Facility: CLINIC | Age: 73
End: 2023-11-20
Payer: COMMERCIAL

## 2023-11-20 DIAGNOSIS — F41.9 ANXIETY: Primary | ICD-10-CM

## 2023-11-20 PROCEDURE — 90834 PSYTX W PT 45 MINUTES: CPT

## 2023-11-20 NOTE — PROGRESS NOTES
"[facility] - Psychotherapy Follow-up Visit Note  Visit number: 23    Visit Type Performed: In-office     Keri Hilton presented today for a behavioral health visit.      SUBJECTIVE     Symptoms  Depression symptoms: anhedonia, lack of motivation, insomnia, fatigue/lack of energy and feelings of guilt  Anxiety symptoms: mild anxiety/worry, restless/\"on edge\" and irritability  Additional reported symptoms: \"shaky/wobbly\" legs (rule out medical, but could be psychosomatic)     OBJECTIVE     Mental Status Evaluation  Patient's mood and affect were consistent with the context, and consistent with their baseline: Yes   Comments: Slightly tearful, Calm and pleasant; awake and alert; oriented to person, place, and time    Suicidal Ideation/Homicidal Ideation Risk Assessment: not assessed. If not assessed, reason: Pt did not endorse any changes in risk or protective factors and none were observed.      Plan for Safety-   N/A:  Risk is assessed to be minimal; therefore, developing a safety plan is not indicated at this time.    Interventions  Behavior Management and Cognitive Behavior Therapy  We further processed physical concerns with gait and balance likely attributed to medication and negatively impacting Patient. We processed overall changes in health over the past few years and subsequent medical appointments. We processed through an emotional response to a recent cardiologist appointment that reminded Patient of the trauma associated with her seizures. We discussed her and her 's recent visit to an income based housing option and steps the couple is taking to identify more affordable housing.     Psychotropic medications: no known adherence challenges, effective     Current Outpatient Medications   Medication Sig Dispense Refill    ARIPiprazole (ABILIFY) 2 mg tablet Take 1 tablet (2 mg total) by mouth daily. 90 tablet 0    aspirin 81 mg enteric coated tablet Take 81 mg by mouth daily.   "    atorvastatin (LIPITOR) 80 mg tablet Take 80 mg by mouth daily.       busPIRone (BUSPAR) 10 mg tablet Take 2 tablets (20 mg total) by mouth 3 (three) times a day. 540 tablet 0    diphenhydrAMINE (BENADRYL) 25 mg capsule Take 25 mg by mouth nightly as needed for sleep.      gabapentin (NEURONTIN) 600 mg tablet Take 1 tablet (600 mg total) by mouth 2 (two) times a day. 180 tablet 1    lisinopriL (PRINIVIL) 2.5 mg tablet Take 1 tablet (2.5 mg total) by mouth once daily.       No current facility-administered medications for this visit.       ASSESSMENT       Progress  Patient's progress toward their goals is generally improving as Patient processed the impact of recent physical and mental health concerns on Patient's quality of life.   Patient's symptomology has gradually improving.  However, Patient continues to report feeling flat and additionally continues to share depressive symptoms including irritability, anhedonia and apathy.     PLAN     Goals:  Development of skills for management of nonepileptic seizures    -Patient hopes to be able to return to driving     Recommendations  Individual Therapy  45 minutes weekly     Next visit plan:  Develop SMART goals related to getting back to driving when able as Patient is waiting until 6 months seizure free to return to driving    I spent 45 minutes on this date of service performing the following activities: providing counseling and education.

## 2023-11-27 ENCOUNTER — OFFICE VISIT (OUTPATIENT)
Dept: BEHAVIORAL HEALTH | Facility: CLINIC | Age: 73
End: 2023-11-27
Payer: COMMERCIAL

## 2023-11-27 DIAGNOSIS — F41.9 ANXIETY: Primary | ICD-10-CM

## 2023-11-27 PROCEDURE — 90834 PSYTX W PT 45 MINUTES: CPT

## 2023-11-27 NOTE — PROGRESS NOTES
"[facility] - Psychotherapy Follow-up Visit Note  Visit number: 24    Visit Type Performed: In-office     eKri Hilton presented today for a behavioral health visit.      SUBJECTIVE     Symptoms  Depression symptoms: anhedonia, lack of motivation and fatigue/lack of energy  Anxiety symptoms: mild anxiety/worry, restless/\"on edge\" and irritability  Additional reported symptoms: \"shaky/wobbly\" legs (rule out medical, but could be psychosomatic)     OBJECTIVE     Mental Status Evaluation  Patient's mood and affect were consistent with the context, and consistent with their baseline: Yes   Comments: Calm and pleasant; awake and alert; oriented to person, place, and time    Suicidal Ideation/Homicidal Ideation Risk Assessment: not assessed. If not assessed, reason: Pt did not endorse any changes in risk or protective factors and none were observed.      Plan for Safety-   N/A:  Risk is assessed to be minimal; therefore, developing a safety plan is not indicated at this time.    Interventions  Behavior Management and Cognitive Behavior Therapy  We further processed physical concerns with gait and balance likely attributed to medication and negatively impacting Patient. We discussed the best course of action to address these concerns through self-advocacy with medical providers. We processed continued disinterest in previously enjoyed activities and situations where Patient is capable of pushing herself to be present and engage in the activity. We processed improved communication with her .     Psychotropic medications: no known adherence challenges, effective     Current Outpatient Medications   Medication Sig Dispense Refill    ARIPiprazole (ABILIFY) 2 mg tablet Take 1 tablet (2 mg total) by mouth daily. 90 tablet 0    aspirin 81 mg enteric coated tablet Take 81 mg by mouth daily.      atorvastatin (LIPITOR) 80 mg tablet Take 80 mg by mouth daily.       busPIRone (BUSPAR) 10 mg tablet Take " 2 tablets (20 mg total) by mouth 3 (three) times a day. 540 tablet 0    diphenhydrAMINE (BENADRYL) 25 mg capsule Take 25 mg by mouth nightly as needed for sleep.      gabapentin (NEURONTIN) 600 mg tablet Take 1 tablet (600 mg total) by mouth 2 (two) times a day. 180 tablet 1    lisinopriL (PRINIVIL) 2.5 mg tablet Take 1 tablet (2.5 mg total) by mouth once daily.       No current facility-administered medications for this visit.       ASSESSMENT       Progress  Patient's progress toward their goals is generally improving as Patient further processed the impact of recent physical and mental health concerns on Patient's quality of life.   Patient's symptomology is gradually improving.  However, Patient continues to report feeling flat and additionally continues to share depressive symptoms including irritability, anhedonia and apathy.     PLAN     Goals:  Development of skills for management of nonepileptic seizures    -Patient hopes to be able to return to driving     Recommendations  Individual Therapy  45 minutes weekly     Next visit plan:  Develop SMART goals related to getting back to driving when able as Patient is waiting until 6 months seizure free to return to driving (Jan 22)    I spent 45 minutes on this date of service performing the following activities: providing counseling and education.

## 2023-12-11 ENCOUNTER — OFFICE VISIT (OUTPATIENT)
Dept: BEHAVIORAL HEALTH | Facility: CLINIC | Age: 73
End: 2023-12-11
Payer: COMMERCIAL

## 2023-12-11 DIAGNOSIS — F41.9 ANXIETY: Primary | ICD-10-CM

## 2023-12-11 PROCEDURE — 90834 PSYTX W PT 45 MINUTES: CPT

## 2023-12-11 NOTE — PROGRESS NOTES
"[facility] - Psychotherapy Follow-up Visit Note  Visit number: 25    Visit Type Performed: In-office     Keri Hilton presented today for a behavioral health visit.      SUBJECTIVE     Symptoms  Depression symptoms: anhedonia, lack of motivation and fatigue/lack of energy  Anxiety symptoms: mild anxiety/worry, restless/\"on edge\" and irritability  Additional reported symptoms: \"shaky/wobbly\" legs (rule out medical, but could be psychosomatic)     OBJECTIVE     Mental Status Evaluation  Patient's mood and affect were consistent with the context, and consistent with their baseline: Yes   Comments: Calm and pleasant; awake and alert; oriented to person, place, and time    Suicidal Ideation/Homicidal Ideation Risk Assessment: not assessed. If not assessed, reason: Pt did not endorse any changes in risk or protective factors and none were observed.      Plan for Safety-   N/A:  Risk is assessed to be minimal; therefore, developing a safety plan is not indicated at this time.    Interventions  Behavior Management and Cognitive Behavior Therapy  We processed continued apathy in her daily life as well as her recognition of grief's role in her symptoms as she randolph with aging and the changes with her health. We processed factors that likely impact her mood ongoing including her sons disliking her . We processed several regrets in life related to employment and the impact of COVID on choices the couple made.     Psychotropic medications: no known adherence challenges, effective     Current Outpatient Medications   Medication Sig Dispense Refill   • ARIPiprazole (ABILIFY) 2 mg tablet Take 1 tablet (2 mg total) by mouth daily. 90 tablet 0   • aspirin 81 mg enteric coated tablet Take 81 mg by mouth daily.     • atorvastatin (LIPITOR) 80 mg tablet Take 80 mg by mouth daily.      • busPIRone (BUSPAR) 10 mg tablet Take 2 tablets (20 mg total) by mouth 3 (three) times a day. 540 tablet 0   • " diphenhydrAMINE (BENADRYL) 25 mg capsule Take 25 mg by mouth nightly as needed for sleep.     • gabapentin (NEURONTIN) 600 mg tablet Take 1 tablet (600 mg total) by mouth 2 (two) times a day. 180 tablet 1   • lisinopriL (PRINIVIL) 2.5 mg tablet Take 1 tablet (2.5 mg total) by mouth once daily.       No current facility-administered medications for this visit.       ASSESSMENT       Progress  Patient's progress toward their goals is generally improving as Patient further processed the impact of recent physical and mental health concerns on her quality of life.   Patient's symptomology is unchanged. Patient continues to report feeling flat and additionally continues to share depressive symptoms including irritability, anhedonia and apathy.     PLAN     Goals:  Development of skills for management of nonepileptic seizures    -Patient hopes to be able to return to driving     Recommendations  Individual Therapy  45 minutes weekly     Next visit plan:  Develop SMART goals related to getting back to driving when able as Patient is waiting until 6 months seizure free to return to driving (Jan 22)    I spent 45 minutes on this date of service performing the following activities: providing counseling and education.

## 2023-12-16 ENCOUNTER — NURSE TRIAGE (OUTPATIENT)
Dept: INTERNAL MEDICINE | Facility: CLINIC | Age: 73
End: 2023-12-16
Payer: COMMERCIAL

## 2023-12-16 RX ORDER — OSELTAMIVIR PHOSPHATE 75 MG/1
75 CAPSULE ORAL 2 TIMES DAILY
Qty: 10 CAPSULE | Refills: 0 | Status: SHIPPED | OUTPATIENT
Start: 2023-12-16 | End: 2023-12-21

## 2023-12-16 NOTE — TELEPHONE ENCOUNTER
"    Reason for Disposition  • [1] Influenza EXPOSURE (Close Contact) within last 72 hours (3 days) AND [2] exposed person is HIGH RISK (e.g., age > 64 years, pregnant, HIV+, chronic medical condition)    Answer Assessment - Initial Assessment Questions  1. TYPE of EXPOSURE: \"How were you exposed?\" (e.g., close contact, not a close contact)    Close contact  2. DATE of EXPOSURE: \"When did the exposure occur?\" (e.g., hour, days, weeks)      12/16/2023  3. PREGNANCY: \"Is there any chance you are pregnant?\" \"When was your last menstrual period?\"      N/A  4. HIGH RISK for COMPLICATIONS: \"Do you have any heart or lung problems? Do you have a weakened immune system?\" (e.g., CHF, COPD, asthma, HIV positive, chemotherapy, renal failure, diabetes mellitus, sickle cell anemia)      Yes  5. SYMPTOMS: \"Do you have any symptoms?\" (e.g., cough, fever, sore throat, difficulty breathing).      Denies    Protocols used: INFLUENZA EXPOSURE-ADULT-    Care Advice Given     Given By Given At Modified    Tarsha Guerra CRNP 12/16/2023  6:42 PM No    CALL PCP WITHIN 24 HOURS: You need to discuss this with your doctor within the next 24 hours.    * IF OFFICE WILL BE OPEN: Call the office when it opens tomorrow morning.    * IF OFFICE WILL BE CLOSED: I'll page him now.(  EXCEPTION: from 9 pm to 9 am. Since this isn't urgent, we'll hold the page until morning.)    Tarsha Guerra CRNP 12/16/2023  6:42 PM No    REASSURANCE:   * Although you were exposed to flu, you do not have any symptoms.   * Symptoms usually develop within 1-4 days of exposure to another person with flu (7 days is an outer limit).          "

## 2023-12-29 ENCOUNTER — OFFICE VISIT (OUTPATIENT)
Dept: BEHAVIORAL HEALTH | Facility: CLINIC | Age: 73
End: 2023-12-29
Payer: COMMERCIAL

## 2023-12-29 DIAGNOSIS — F41.9 ANXIETY: Primary | ICD-10-CM

## 2023-12-29 PROCEDURE — 90834 PSYTX W PT 45 MINUTES: CPT

## 2024-01-02 NOTE — PROGRESS NOTES
"[facility] - Psychotherapy Follow-up Visit Note  Visit number: 26    Visit Type Performed: In-office     Keri Hilton presented today for a behavioral health visit.      SUBJECTIVE     Symptoms  Depression symptoms: anhedonia, lack of motivation, fatigue/lack of energy and apathy  Anxiety symptoms: mild anxiety/worry, restless/\"on edge\" and irritability  Additional reported symptoms: \"shaky/wobbly\" legs (rule out medical/medication side effect, but could be psychosomatic)     OBJECTIVE     Mental Status Evaluation  Patient's mood and affect were consistent with the context, and consistent with their baseline: Yes   Comments: Calm and pleasant; awake and alert; oriented to person, place, and time    Suicidal Ideation/Homicidal Ideation Risk Assessment: not assessed. If not assessed, reason: Pt did not endorse any changes in risk or protective factors and none were observed.      Plan for Safety-   N/A:  Risk is assessed to be minimal; therefore, developing a safety plan is not indicated at this time.    Interventions  Behavior Management and Cognitive Behavior Therapy  We processed the recent stress associated with her 's hospitalization due to the flu and how it impacted the holidays. We discussed Patient's positive experience with driving the car home from the hospital when her  was admitted. We processed continued apathy in her daily life and worry regarding physical symptoms that Therapist again encouraged follow up with medical providers for. We discussed the general rate of progress for Patient since the beginning of treatment and since her PHP.     Psychotropic medications: no known adherence challenges, effective     Current Outpatient Medications   Medication Sig Dispense Refill   • ARIPiprazole (ABILIFY) 2 mg tablet Take 1 tablet (2 mg total) by mouth daily. 90 tablet 0   • aspirin 81 mg enteric coated tablet Take 81 mg by mouth daily.     • atorvastatin (LIPITOR) 80 mg " tablet Take 80 mg by mouth daily.      • busPIRone (BUSPAR) 10 mg tablet Take 2 tablets (20 mg total) by mouth 3 (three) times a day. 540 tablet 0   • diphenhydrAMINE (BENADRYL) 25 mg capsule Take 25 mg by mouth nightly as needed for sleep.     • gabapentin (NEURONTIN) 600 mg tablet Take 1 tablet (600 mg total) by mouth 2 (two) times a day. 180 tablet 1   • lisinopriL (PRINIVIL) 2.5 mg tablet Take 1 tablet (2.5 mg total) by mouth once daily.       No current facility-administered medications for this visit.       ASSESSMENT       Progress  Patient's progress toward their goals is generally improving as Patient processed recent stressors related to her 's health which has been an area of anxiety throughout treatment.   Patient's symptomology is unchanged. Patient continues to report feeling flat and additionally continues to share depressive symptoms including irritability, anhedonia and apathy. Patient continues to deny seizure like activity.     PLAN     Goals:  Development of skills for management of nonepileptic seizures    -Patient hopes to be able to return to driving     Recommendations  Individual Therapy  45 minutes weekly     Next visit plan:  Develop SMART goals related to getting back to driving when able as Patient is waiting until 6 months seizure free to return to driving (Jan 22)    I spent 45 minutes on this date of service performing the following activities: providing counseling and education.

## 2024-01-08 ENCOUNTER — OFFICE VISIT (OUTPATIENT)
Dept: PSYCHIATRY | Facility: HOSPITAL | Age: 74
End: 2024-01-08
Attending: PSYCHIATRY & NEUROLOGY
Payer: COMMERCIAL

## 2024-01-08 DIAGNOSIS — F32.A DEPRESSIVE DISORDER: ICD-10-CM

## 2024-01-08 DIAGNOSIS — F41.1 GENERALIZED ANXIETY DISORDER: ICD-10-CM

## 2024-01-08 DIAGNOSIS — F44.5 PSYCHOGENIC NONEPILEPTIC SEIZURE: ICD-10-CM

## 2024-01-08 PROCEDURE — 99214 OFFICE O/P EST MOD 30 MIN: CPT | Performed by: PSYCHIATRY & NEUROLOGY

## 2024-01-08 RX ORDER — ARIPIPRAZOLE 2 MG/1
2 TABLET ORAL DAILY
Qty: 90 TABLET | Refills: 0 | Status: SHIPPED | OUTPATIENT
Start: 2024-01-08 | End: 2024-04-22 | Stop reason: SDUPTHER

## 2024-01-08 RX ORDER — BUSPIRONE HYDROCHLORIDE 10 MG/1
20 TABLET ORAL 3 TIMES DAILY
Qty: 540 TABLET | Refills: 0 | Status: SHIPPED | OUTPATIENT
Start: 2024-01-08 | End: 2024-04-22 | Stop reason: SDUPTHER

## 2024-01-08 NOTE — LETTER
WE PSYCH ONLY TREATMENT PLAN 1/8/24   Effective from: 1/8/2024  Effective to: 7/6/2024    Active  Plan ID: [id number]           Participants as of 1/8/2024    Name Type Comments Contact Info    Patricia Vinson MD Consulting Physician  [phone]    Keri Hilton Patient  [phone]      Patient Strengths & Barriers     None      Psych Only Treatment Plan          Psychiatry Treatment Plan  Date: 1/8/2024  Expiration Date:  5/7/2024      Patient Name:  Keri Hilton       YOB: 1950    Symptom/ Problem Intervention Frequency and Duration Goal Progress Objective by discharge       Mood  issues as evidenced by Decreased energy Medication as prescribed with continued  monitoring. Follow up in 6 Weeks  Fair Alleviate symptoms and return to previous level of effective functioning   Sleep difficulties as evidenced by Insomnia Medication as prescribed with continued  monitoring.  Follow up in 6 Weeks   Fair     Alleviate symptoms and return to previous level of effective functioning         Treatment Plan Created/Updated By: Patricia Vinson MD
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
## 2024-01-12 ENCOUNTER — HOSPITAL ENCOUNTER (OUTPATIENT)
Dept: RADIOLOGY | Age: 74
Discharge: HOME | End: 2024-01-12
Attending: FAMILY MEDICINE
Payer: COMMERCIAL

## 2024-01-12 DIAGNOSIS — Z78.0 POST-MENOPAUSAL: ICD-10-CM

## 2024-01-12 PROCEDURE — 77080 DXA BONE DENSITY AXIAL: CPT

## 2024-01-15 ENCOUNTER — OFFICE VISIT (OUTPATIENT)
Dept: BEHAVIORAL HEALTH | Facility: CLINIC | Age: 74
End: 2024-01-15
Payer: COMMERCIAL

## 2024-01-15 DIAGNOSIS — F41.9 ANXIETY: Primary | ICD-10-CM

## 2024-01-15 PROCEDURE — 90834 PSYTX W PT 45 MINUTES: CPT

## 2024-01-15 NOTE — PROGRESS NOTES
"[facility] - Psychotherapy Follow-up Visit Note  Visit number: 27    Visit Type Performed: In-office     Keri Hilton presented today for a behavioral health visit.      SUBJECTIVE     Symptoms  Depression symptoms: anhedonia, fatigue/lack of energy and apathy  Anxiety symptoms: mild anxiety/worry, restless/\"on edge\" and irritability  Additional reported symptoms: \"shaky/wobbly\" legs (rule out medical/medication side effect, but could be psychosomatic)     OBJECTIVE     Mental Status Evaluation  Patient's mood and affect were consistent with the context, and consistent with their baseline: Yes   Comments: Calm and pleasant; awake and alert; oriented to person, place, and time    Suicidal Ideation/Homicidal Ideation Risk Assessment: not assessed. If not assessed, reason: Pt did not endorse any changes in risk or protective factors and none were observed.      Plan for Safety-   N/A:  Risk is assessed to be minimal; therefore, developing a safety plan is not indicated at this time.    Interventions  Anxiety Reduction Techniques and Cognitive Behavior Therapy  We processed Patient's efforts to address several medical concerns that have been addressed in previous sessions. We processed recent efforts to engage her  in conversations about their finances and living situation and develop SMART goals together. We processed Patient's recent attempt to stop vaping with minimal success resulting in an episode of anxiety. We discussed different strategies to cope with quitting vaping, and anxiety in general as vaping was a stress reliever, including deep breathing, listening to music, grounding techniques, exercise, and fidget toys. We processed Patient's upcoming 6 month seizure free samira including steps to take to limit anxiety while driving including breathing techniques and starting with short distances.     Psychotropic medications: no known adherence challenges, effective     Current Outpatient " Medications   Medication Sig Dispense Refill   • ARIPiprazole (ABILIFY) 2 mg tablet Take 1 tablet (2 mg total) by mouth daily. 90 tablet 0   • aspirin 81 mg enteric coated tablet Take 81 mg by mouth daily.     • atorvastatin (LIPITOR) 80 mg tablet Take 80 mg by mouth daily.      • busPIRone (BUSPAR) 10 mg tablet Take 2 tablets (20 mg total) by mouth 3 (three) times a day. 540 tablet 0   • diphenhydrAMINE (BENADRYL) 25 mg capsule Take 25 mg by mouth nightly as needed for sleep.     • gabapentin (NEURONTIN) 600 mg tablet Take 1 tablet (600 mg total) by mouth 2 (two) times a day. 180 tablet 1     No current facility-administered medications for this visit.       ASSESSMENT       Progress  Patient's progress toward their goals is generally improving as Patient processed efforts to move forward in several areas of self-care and health.   Patient's symptomology is gradually improving. Patient disclosed improved mood though continues to note moments of anxiety and boredom. Patient continues to deny seizure like activity.     PLAN     Goals:  Development of skills for management of nonepileptic seizures    -Patient hopes to be able to return to driving     Recommendations  Individual Therapy  45 minutes 2-3 times monthly     Next visit plan:  Assess progress with goals of driving and quitting vaping    I spent 40 minutes on this date of service performing the following activities: providing counseling and education.

## 2024-01-26 DIAGNOSIS — G62.9 NEUROPATHY: ICD-10-CM

## 2024-01-27 LAB — HBA1C MFR BLD: 5.8 % (ref 4.8–5.6)

## 2024-01-29 ENCOUNTER — OFFICE VISIT (OUTPATIENT)
Dept: BEHAVIORAL HEALTH | Facility: CLINIC | Age: 74
End: 2024-01-29
Payer: COMMERCIAL

## 2024-01-29 DIAGNOSIS — F41.9 ANXIETY: Primary | ICD-10-CM

## 2024-01-29 PROCEDURE — 90834 PSYTX W PT 45 MINUTES: CPT

## 2024-01-29 RX ORDER — GABAPENTIN 600 MG/1
600 TABLET ORAL 2 TIMES DAILY
Qty: 180 TABLET | Refills: 1 | Status: SHIPPED | OUTPATIENT
Start: 2024-01-29 | End: 2024-03-15
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
## 2024-01-29 NOTE — TELEPHONE ENCOUNTER
Medicine Refill Request    Last Office Visit: 9/28/2023   Last Consult Visit: Visit date not found  Last Telemedicine Visit: Visit date not found    Next Appointment: Visit date not found      Current Outpatient Medications:   •  ARIPiprazole (ABILIFY) 2 mg tablet, Take 1 tablet (2 mg total) by mouth daily., Disp: 90 tablet, Rfl: 0  •  aspirin 81 mg enteric coated tablet, Take 81 mg by mouth daily., Disp: , Rfl:   •  atorvastatin (LIPITOR) 80 mg tablet, Take 80 mg by mouth daily. , Disp: , Rfl:   •  busPIRone (BUSPAR) 10 mg tablet, Take 2 tablets (20 mg total) by mouth 3 (three) times a day., Disp: 540 tablet, Rfl: 0  •  diphenhydrAMINE (BENADRYL) 25 mg capsule, Take 25 mg by mouth nightly as needed for sleep., Disp: , Rfl:   •  gabapentin (NEURONTIN) 600 mg tablet, Take 1 tablet (600 mg total) by mouth 2 (two) times a day., Disp: 180 tablet, Rfl: 1      BP Readings from Last 3 Encounters:   09/28/23 100/62   07/24/23 136/64   04/11/23 114/72       Recent Lab results:  Lab Results   Component Value Date    CHOL 118 11/02/2023   ,   Lab Results   Component Value Date    HDL 39 (L) 11/02/2023   ,   Lab Results   Component Value Date    LDLCALC 51 11/02/2023   ,   Lab Results   Component Value Date    TRIG 168 (H) 11/02/2023        Lab Results   Component Value Date    GLUCOSE 109 (H) 11/02/2023   ,   Lab Results   Component Value Date    HGBA1C 5.8 (H) 01/26/2024         Lab Results   Component Value Date    CREATININE 0.57 11/02/2023       Lab Results   Component Value Date    TSH 2.050 08/18/2022           Lab Results   Component Value Date    HGBA1C 5.8 (H) 01/26/2024

## 2024-02-05 ENCOUNTER — OFFICE VISIT (OUTPATIENT)
Dept: BEHAVIORAL HEALTH | Facility: CLINIC | Age: 74
End: 2024-02-05
Payer: COMMERCIAL

## 2024-02-05 DIAGNOSIS — F41.9 ANXIETY: Primary | ICD-10-CM

## 2024-02-05 PROCEDURE — 90834 PSYTX W PT 45 MINUTES: CPT

## 2024-02-06 NOTE — PROGRESS NOTES
"[facility] - Psychotherapy Follow-up Visit Note  Visit number: 29    Visit Type Performed: In-office     Keri Hilton presented today for a behavioral health visit.      SUBJECTIVE     Symptoms  Depression symptoms: anhedonia, fatigue/lack of energy and apathy  Anxiety symptoms: mild anxiety/worry, restless/\"on edge\", irritability and sleep disturbance  Additional reported symptoms: NA    OBJECTIVE     Mental Status Evaluation  Patient's mood and affect were consistent with the context, and consistent with their baseline: Yes   Comments: Calm and pleasant; awake and alert; oriented to person, place, and time    Suicidal Ideation/Homicidal Ideation Risk Assessment: not assessed. If not assessed, reason: Pt did not endorse any changes in risk or protective factors and none were observed.      Plan for Safety-   N/A:  Risk is assessed to be minimal; therefore, developing a safety plan is not indicated at this time.    Interventions  Acceptance and Adjustment and Cognitive Behavior Therapy  We processed difficulty in letting go of the \"stupid\" choices she and her  made resulting in their current financial situation. We processed factors making it hard for Patient to forgive herself including embarrassment, anger and sadness. We discussed strategies of self-compassion related to forgiving herself including creating mantras that include Patient is worthy of forgiveness. We discussed progress with increased walking, continued light therapy, and beginning to drive.     Psychotropic medications: no known adherence challenges, effective     Current Outpatient Medications   Medication Sig Dispense Refill   • gabapentin (NEURONTIN) 600 mg tablet Take 1 tablet (600 mg total) by mouth 2 (two) times a day. 180 tablet 1   • ARIPiprazole (ABILIFY) 2 mg tablet Take 1 tablet (2 mg total) by mouth daily. 90 tablet 0   • aspirin 81 mg enteric coated tablet Take 81 mg by mouth daily.     • atorvastatin " (LIPITOR) 80 mg tablet Take 80 mg by mouth daily.      • busPIRone (BUSPAR) 10 mg tablet Take 2 tablets (20 mg total) by mouth 3 (three) times a day. 540 tablet 0   • diphenhydrAMINE (BENADRYL) 25 mg capsule Take 25 mg by mouth nightly as needed for sleep.       No current facility-administered medications for this visit.       ASSESSMENT       Progress  Patient's progress toward their goals is generally improving as Patient further processed regrets that continue to linger for Patient, which creates restlessness and increases anxiety symptoms. Patient has started driving short distances.  Patient's symptomology is gradually improving. Patient disclosed improved mood though continues to note moments of anxiety and apathy. Patient continues to deny seizure like activity.      PLAN     Goals:  Development of skills for management of nonepileptic seizures    -Patient hopes to be able to return to driving     Recommendations  Individual Therapy  45 minutes 2-3 times monthly     Next visit plan:  -Assess the efficacy of sleep techniques and follow up with Psychiatry   -Review progress in treatment and program guidelines    I spent 45 minutes on this date of service performing the following activities: providing counseling and education.

## 2024-02-12 ENCOUNTER — TELEPHONE (OUTPATIENT)
Dept: PSYCHIATRY | Facility: HOSPITAL | Age: 74
End: 2024-02-12
Payer: COMMERCIAL

## 2024-02-12 NOTE — TELEPHONE ENCOUNTER
Patient inquiring about copay that was paid at time of service 11/06/2023, 01/08/2024 provided billing number [phone], offered to transfer. Patient needs to check records of payment and will call billing directly.

## 2024-02-16 ENCOUNTER — HOSPITAL ENCOUNTER (OUTPATIENT)
Dept: RADIOLOGY | Age: 74
Discharge: HOME | End: 2024-02-16
Attending: FAMILY MEDICINE
Payer: COMMERCIAL

## 2024-02-16 DIAGNOSIS — Z12.31 BREAST CANCER SCREENING BY MAMMOGRAM: ICD-10-CM

## 2024-02-16 PROCEDURE — 77067 SCR MAMMO BI INCL CAD: CPT

## 2024-02-21 PROBLEM — Z12.11 SCREENING FOR COLON CANCER: Status: RESOLVED | Noted: 2019-04-29 | Resolved: 2024-02-21

## 2024-02-26 ENCOUNTER — OFFICE VISIT (OUTPATIENT)
Dept: BEHAVIORAL HEALTH | Facility: CLINIC | Age: 74
End: 2024-02-26
Payer: COMMERCIAL

## 2024-02-26 DIAGNOSIS — F41.9 ANXIETY: Primary | ICD-10-CM

## 2024-02-26 PROCEDURE — 90834 PSYTX W PT 45 MINUTES: CPT

## 2024-02-27 NOTE — PROGRESS NOTES
[facility] - Psychotherapy Follow-up Visit Note  Visit number: 30    Visit Type Performed: In-office     Keri Hilton presented today for a behavioral health visit.      SUBJECTIVE     Symptoms  Depression symptoms: anhedonia, fatigue/lack of energy and apathy  Anxiety symptoms: mild anxiety/worry, irritability and sleep disturbance  Additional reported symptoms: NA    OBJECTIVE     Mental Status Evaluation  Patient's mood and affect were consistent with the context, and consistent with their baseline: Yes   Comments: Calm and pleasant; awake and alert; oriented to person, place, and time    Suicidal Ideation/Homicidal Ideation Risk Assessment: not assessed. If not assessed, reason: Pt did not endorse any changes in risk or protective factors and none were observed.      Plan for Safety-   N/A:  Risk is assessed to be minimal; therefore, developing a safety plan is not indicated at this time.    Interventions  Acceptance and Adjustment and Cognitive Behavior Therapy  We processed the recent death of her coworker and how this has impacted the structure at work as well as resulting in increased work hours. We processed the impact of her 's medical and mental health needs on the couple's progress with moving forward on Patient's goals. We discussed Patient's positive experiences with driving thus far. We further discussed Patient's continued poor sleep and how this is impacting her every day life.     Psychotropic medications: no known adherence challenges, somewhat effective     Current Outpatient Medications   Medication Sig Dispense Refill   • gabapentin (NEURONTIN) 600 mg tablet Take 1 tablet (600 mg total) by mouth 2 (two) times a day. 180 tablet 1   • ARIPiprazole (ABILIFY) 2 mg tablet Take 1 tablet (2 mg total) by mouth daily. 90 tablet 0   • aspirin 81 mg enteric coated tablet Take 81 mg by mouth daily.     • atorvastatin (LIPITOR) 80 mg tablet Take 80 mg by mouth daily.      •  busPIRone (BUSPAR) 10 mg tablet Take 2 tablets (20 mg total) by mouth 3 (three) times a day. 540 tablet 0   • diphenhydrAMINE (BENADRYL) 25 mg capsule Take 25 mg by mouth nightly as needed for sleep.       No current facility-administered medications for this visit.       ASSESSMENT       Progress  Patient's progress toward their goals is generally improving as Patient processed recent adjustment following the unexpected death of her coworker.   Patient's symptomology is gradually improving. Patient disclosed improved mood though continues to note moments of anxiety and apathy. Patient continues to deny seizure like activity.      PLAN     Goals:  Development of skills for management of nonepileptic seizures    -Patient hopes to be able to return to driving     Recommendations  Individual Therapy  45 minutes 2-3 times monthly     Next visit plan:  -Follow up with Psychiatry   -Review progress in treatment and program guidelines as this was not discussed due to grief support    I spent 40 minutes on this date of service performing the following activities: providing counseling and education.

## 2024-03-04 ENCOUNTER — OFFICE VISIT (OUTPATIENT)
Dept: PSYCHIATRY | Facility: HOSPITAL | Age: 74
End: 2024-03-04
Attending: PSYCHIATRY & NEUROLOGY
Payer: COMMERCIAL

## 2024-03-04 DIAGNOSIS — F41.1 GENERALIZED ANXIETY DISORDER: ICD-10-CM

## 2024-03-04 DIAGNOSIS — F32.A DEPRESSIVE DISORDER: ICD-10-CM

## 2024-03-04 DIAGNOSIS — F44.5 PSYCHOGENIC NONEPILEPTIC SEIZURE: ICD-10-CM

## 2024-03-04 PROCEDURE — 99214 OFFICE O/P EST MOD 30 MIN: CPT | Performed by: PSYCHIATRY & NEUROLOGY

## 2024-03-04 RX ORDER — EZETIMIBE 10 MG/1
10 TABLET ORAL DAILY
COMMUNITY
Start: 2024-01-05

## 2024-03-04 RX ORDER — FLUOXETINE 10 MG/1
10 CAPSULE ORAL EVERY MORNING
Qty: 30 CAPSULE | Refills: 1 | Status: SHIPPED | OUTPATIENT
Start: 2024-03-04 | End: 2024-04-22 | Stop reason: SDUPTHER
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
## 2024-03-08 ENCOUNTER — TELEPHONE (OUTPATIENT)
Dept: PSYCHIATRY | Facility: HOSPITAL | Age: 74
End: 2024-03-08
Payer: COMMERCIAL

## 2024-03-08 NOTE — TELEPHONE ENCOUNTER
Patient has confirmed 04/22 appointment with Dr. Vinson location is in [facility].

## 2024-03-11 ENCOUNTER — HOSPITAL ENCOUNTER (OUTPATIENT)
Dept: RADIOLOGY | Age: 74
Discharge: HOME | End: 2024-03-11
Attending: RADIOLOGY
Payer: COMMERCIAL

## 2024-03-11 ENCOUNTER — TELEPHONE (OUTPATIENT)
Dept: RADIOLOGY | Facility: HOSPITAL | Age: 74
End: 2024-03-11
Payer: COMMERCIAL

## 2024-03-11 DIAGNOSIS — R92.8 ABNORMAL MAMMOGRAM: ICD-10-CM

## 2024-03-11 PROCEDURE — G0279 TOMOSYNTHESIS, MAMMO: HCPCS | Mod: RT

## 2024-03-11 PROCEDURE — 76642 ULTRASOUND BREAST LIMITED: CPT | Mod: RT

## 2024-03-11 NOTE — TELEPHONE ENCOUNTER
Spoke with patient to schedule consultation and biopsy appointment.     Patient states that she will call back tomorrow to provide available dates to schedule appointments.

## 2024-03-12 ENCOUNTER — TELEPHONE (OUTPATIENT)
Dept: RADIOLOGY | Facility: HOSPITAL | Age: 74
End: 2024-03-12
Payer: COMMERCIAL

## 2024-03-12 NOTE — TELEPHONE ENCOUNTER
Spoke with patient following abnormal breast imaging and assisted with scheduling for breast surgeon consult with Dr Zabala, 3/18/24, 930 am appt, Arrive 9 am, [address], [address] and Right breast Stereotactic biopsy, 3/25/24, 1 pm appt, 12:30 arrival time [facility], 1st floor.    Explained biopsy procedure and procedure instructions, patient agreeable to plan and verbalized understanding.  Allergies reviewed, patient taking 81mg ASA and will need ICE to breast prior to the biopsy procedure.    Reviewed contact information and role, patient encouraged to outreach with any questions, concerns, or care needs.

## 2024-03-15 ENCOUNTER — OFFICE VISIT (OUTPATIENT)
Dept: FAMILY MEDICINE | Facility: CLINIC | Age: 74
End: 2024-03-15
Payer: COMMERCIAL

## 2024-03-15 VITALS
HEIGHT: 66 IN | WEIGHT: 145.2 LBS | OXYGEN SATURATION: 96 % | SYSTOLIC BLOOD PRESSURE: 112 MMHG | BODY MASS INDEX: 23.33 KG/M2 | HEART RATE: 64 BPM | DIASTOLIC BLOOD PRESSURE: 66 MMHG | RESPIRATION RATE: 16 BRPM | TEMPERATURE: 97.7 F

## 2024-03-15 DIAGNOSIS — G56.03 CARPAL TUNNEL SYNDROME ON BOTH SIDES: ICD-10-CM

## 2024-03-15 DIAGNOSIS — R56.9 SEIZURE-LIKE ACTIVITY (CMS/HCC): ICD-10-CM

## 2024-03-15 DIAGNOSIS — E78.5 HYPERLIPIDEMIA, UNSPECIFIED HYPERLIPIDEMIA TYPE: ICD-10-CM

## 2024-03-15 DIAGNOSIS — G62.9 NEUROPATHY: Primary | ICD-10-CM

## 2024-03-15 DIAGNOSIS — Z87.891 HISTORY OF CIGARETTE SMOKING: ICD-10-CM

## 2024-03-15 PROCEDURE — 3074F SYST BP LT 130 MM HG: CPT | Performed by: FAMILY MEDICINE

## 2024-03-15 PROCEDURE — 3078F DIAST BP <80 MM HG: CPT | Performed by: FAMILY MEDICINE

## 2024-03-15 PROCEDURE — 99214 OFFICE O/P EST MOD 30 MIN: CPT | Performed by: FAMILY MEDICINE

## 2024-03-15 PROCEDURE — 3008F BODY MASS INDEX DOCD: CPT | Performed by: FAMILY MEDICINE

## 2024-03-15 RX ORDER — GABAPENTIN 600 MG/1
TABLET ORAL
COMMUNITY
Start: 2024-03-15 | End: 2024-04-08 | Stop reason: SDUPTHER

## 2024-03-15 ASSESSMENT — ENCOUNTER SYMPTOMS
HEADACHES: 0
CHILLS: 0
WHEEZING: 0
NUMBNESS: 0
DIZZINESS: 0
DYSURIA: 0
PALPITATIONS: 0
SHORTNESS OF BREATH: 0
COUGH: 0
WEAKNESS: 0
FREQUENCY: 0
FEVER: 0
BACK PAIN: 0

## 2024-03-15 NOTE — PROGRESS NOTES
"    Subjective      Patient ID: Keri Hilton is a 73 y.o. female.  1950      HPI    Here today for a follow-up visit.  Scheduled for a breast biopsy in about a week.  Would like to get a CT lung cancer screening. Started smoking at age 25 and stopped at 65 but continued vaping until today. Just started with patch today. Was smoking about 1 ppd.   Has been making progress with psychiatrist--has not had a seizure since last July. Started driving again.  Walking has been bad and knows needs to do more exercise. On the gabapentin for neuropathy. Can \"feel her muscles\" when she walks and muscles feel like they don't want to move. Feels weak. Has been to physical in the past and has the exercises. Does not want to go back to PT at this time due to cost.  Diagnosed with carpal tunnel and has had injections and looking into getting surgery if the pain returns.    The following have been reviewed and updated as appropriate in this visit:   Allergies  Meds  Problems       Review of Systems   Constitutional:  Negative for chills and fever.   Eyes:  Negative for visual disturbance.   Respiratory:  Negative for cough, shortness of breath and wheezing.    Cardiovascular:  Negative for chest pain, palpitations and leg swelling.   Genitourinary:  Negative for dysuria, frequency and urgency.   Musculoskeletal:  Positive for gait problem. Negative for back pain.   Skin:  Negative for rash.   Neurological:  Negative for dizziness, weakness, numbness and headaches.       Objective     Vitals:    03/15/24 1255   BP: 112/66   BP Location: Left upper arm   Patient Position: Sitting   Pulse: 64   Resp: 16   Temp: 36.5 °C (97.7 °F)   TempSrc: Temporal   SpO2: 96%   Weight: 65.9 kg (145 lb 3.2 oz)   Height: 1.664 m (5' 5.5\")     Body mass index is 23.8 kg/m².    Physical Exam  Vitals and nursing note reviewed.   Constitutional:       General: She is not in acute distress.     Appearance: Normal appearance. She is well-developed. She " is not ill-appearing.   Cardiovascular:      Rate and Rhythm: Normal rate and regular rhythm.      Heart sounds: Normal heart sounds. No murmur heard.     No friction rub.   Pulmonary:      Effort: Pulmonary effort is normal. No respiratory distress.      Breath sounds: Normal breath sounds. No stridor. No wheezing or rhonchi.   Musculoskeletal:         General: Normal range of motion.   Neurological:      Mental Status: She is alert.         Assessment/Plan   Diagnoses and all orders for this visit:    Neuropathy (Primary)  Assessment & Plan:  Will increase the gabapentin to 600 in AM and 900 in PM.   Discussed PT but would like to hold off at this time due to cost. Has exercises at home from last time she went and will restart those and is also going to look into joining [facility].      Seizure-like activity (CMS/HCC)  Assessment & Plan:  Improved, following with psychiatrist. Continue medication.      Carpal tunnel syndrome on both sides  Assessment & Plan:  Following with orthopedist.      Hyperlipidemia, unspecified hyperlipidemia type  Assessment & Plan:  Continue medication.      History of cigarette smoking  -     CT LUNG SCREENING WITHOUT IV CONTRAST; Future

## 2024-03-15 NOTE — PATIENT INSTRUCTIONS
Increase the gabapentin to 900 mg at night and 600 mg in the morning.  Work on the home exercise programs. Let me know if you would like to try a course of physical therapy.  Schedule for the CT scan.

## 2024-03-15 NOTE — ASSESSMENT & PLAN NOTE
Will increase the gabapentin to 600 in AM and 900 in PM.   Discussed PT but would like to hold off at this time due to cost. Has exercises at home from last time she went and will restart those and is also going to look into joining the [facility].

## 2024-03-18 ENCOUNTER — TELEPHONE (OUTPATIENT)
Dept: PULMONOLOGY | Facility: HOSPITAL | Age: 74
End: 2024-03-18
Payer: COMMERCIAL

## 2024-03-18 ENCOUNTER — OFFICE VISIT (OUTPATIENT)
Dept: BEHAVIORAL HEALTH | Facility: CLINIC | Age: 74
End: 2024-03-18
Payer: COMMERCIAL

## 2024-03-18 DIAGNOSIS — F41.9 ANXIETY: Primary | ICD-10-CM

## 2024-03-18 PROCEDURE — 90834 PSYTX W PT 45 MINUTES: CPT

## 2024-03-18 NOTE — PROGRESS NOTES
[facility] Behavioral Health Services - Psychotherapy Follow-up Visit Note  Visit number: 31    Visit Type Performed: In-office     Keri Hilton presented today for a behavioral health visit.      SUBJECTIVE     Symptoms  Depression symptoms: fatigue/lack of energy  Anxiety symptoms: mild anxiety/worry, irritability and sleep disturbance  Additional reported symptoms: NA    OBJECTIVE     Mental Status Evaluation  Patient's mood and affect were consistent with the context, and consistent with their baseline: Yes   Comments: Calm and pleasant; awake and alert; oriented to person, place, and time    Suicidal Ideation/Homicidal Ideation Risk Assessment: not assessed. If not assessed, reason: Pt did not endorse any changes in risk or protective factors and none were observed.      Plan for Safety-   N/A:  Risk is assessed to be minimal; therefore, developing a safety plan is not indicated at this time.    Interventions  Cognitive Behavior Therapy and Empathic Listening and Validation  We processed progress in her efforts to quit vaping and her ways of coping with withdrawal symptoms and habits. We processed progress in her 's willingness to accept support from her son regarding the property her  owns. We processed the impact of her step-son's mental health on her 's ability to address the stressors associated with that property. We processed efforts to increase driving and adjustments Patient and her  are making to increase her driving. We processed Patient's self-care efforts including taking walks and attending medical appointments to address physical health concerns.     Psychotropic medications: no known adherence challenges, somewhat effective/recent medication changes     Current Outpatient Medications   Medication Sig Dispense Refill   • ARIPiprazole (ABILIFY) 2 mg tablet Take 1 tablet (2 mg total) by mouth daily. 90 tablet 0   • aspirin 81 mg enteric coated tablet Take 81 mg by mouth daily.      • atorvastatin (LIPITOR) 80 mg tablet Take 80 mg by mouth daily.      • busPIRone (BUSPAR) 10 mg tablet Take 2 tablets (20 mg total) by mouth 3 (three) times a day. 540 tablet 0   • diphenhydrAMINE (BENADRYL) 25 mg capsule Take 25 mg by mouth nightly as needed for sleep.     • ezetimibe (ZETIA) 10 mg tablet Take 10 mg by mouth daily.     • FLUoxetine (PROzac) 10 mg capsule Take 1 capsule (10 mg total) by mouth every morning. 30 capsule 1   • gabapentin (NEURONTIN) 600 mg tablet Take 1 tablet (600 mg total) by mouth every morning AND 1.5 tablets (900 mg total) nightly.       No current facility-administered medications for this visit.       ASSESSMENT       Progress  Patient's progress toward their goals is generally improving as Patient processed improvement in symptomology and continued efforts to address situations that create tension or stress in her every day life.   Patient's symptomology is gradually improving. Patient disclosed improved mood and motivation. Patient continues to deny seizure like activity.     PLAN     Goals:  Development of skills for management of nonepileptic seizures    -Patient hopes to be able to return to driving     Recommendations  Individual Therapy  45 minutes 2-3 times monthly     Next visit plan:  -Assess stressors that arose between sessions      I spent 45 minutes on this date of service performing the following activities: providing counseling and education.

## 2024-03-25 ENCOUNTER — HOSPITAL ENCOUNTER (OUTPATIENT)
Dept: RADIOLOGY | Facility: HOSPITAL | Age: 74
Discharge: HOME | End: 2024-03-25
Attending: SURGERY
Payer: COMMERCIAL

## 2024-03-25 ENCOUNTER — TRANSCRIBE ORDERS (OUTPATIENT)
Dept: RADIOLOGY | Facility: HOSPITAL | Age: 74
End: 2024-03-25

## 2024-03-25 DIAGNOSIS — R92.8 OTHER ABNORMAL AND INCONCLUSIVE FINDINGS ON DIAGNOSTIC IMAGING OF BREAST: ICD-10-CM

## 2024-03-25 DIAGNOSIS — R92.8 ABNORMAL MAMMOGRAM: ICD-10-CM

## 2024-03-25 DIAGNOSIS — N63.21 UNSPECIFIED LUMP IN THE LEFT BREAST, UPPER OUTER QUADRANT: ICD-10-CM

## 2024-03-25 DIAGNOSIS — N63.21 UNSPECIFIED LUMP IN THE LEFT BREAST, UPPER OUTER QUADRANT: Primary | ICD-10-CM

## 2024-03-25 PROCEDURE — 88305 TISSUE EXAM BY PATHOLOGIST: CPT | Performed by: SURGERY

## 2024-03-25 PROCEDURE — 36100345 BI STEREOTACTIC BREAST BIOPSY RIGHT: Mod: RT

## 2024-03-25 PROCEDURE — 25000000 HC PHARMACY GENERAL: Performed by: SURGERY

## 2024-03-25 PROCEDURE — 0HBT3ZX EXCISION OF RIGHT BREAST, PERCUTANEOUS APPROACH, DIAGNOSTIC: ICD-10-PCS | Performed by: RADIOLOGY

## 2024-03-25 PROCEDURE — 77065 DX MAMMO INCL CAD UNI: CPT | Mod: RT

## 2024-03-25 PROCEDURE — 76642 ULTRASOUND BREAST LIMITED: CPT | Mod: LT

## 2024-03-25 RX ORDER — LIDOCAINE HYDROCHLORIDE AND EPINEPHRINE 10; 10 UG/ML; MG/ML
20 INJECTION, SOLUTION INFILTRATION; PERINEURAL ONCE
Status: COMPLETED | OUTPATIENT
Start: 2024-03-25 | End: 2024-03-25

## 2024-03-25 RX ORDER — LIDOCAINE HYDROCHLORIDE 10 MG/ML
3 INJECTION, SOLUTION EPIDURAL; INFILTRATION; INTRACAUDAL; PERINEURAL ONCE
Status: COMPLETED | OUTPATIENT
Start: 2024-03-25 | End: 2024-03-25

## 2024-03-25 RX ADMIN — LIDOCAINE HYDROCHLORIDE AND EPINEPHRINE 20 ML: 10; 10 INJECTION, SOLUTION INFILTRATION; PERINEURAL at 13:20

## 2024-03-25 RX ADMIN — LIDOCAINE HYDROCHLORIDE 3 ML: 10 INJECTION, SOLUTION EPIDURAL; INFILTRATION; INTRACAUDAL; PERINEURAL at 13:19

## 2024-03-25 NOTE — OR SURGEON
Pre-Procedure patient identification:  I am the primary operating surgeon/proceduralist and I have reviewed the applicable pathology reports and radiology studies for this procedure. I have identified the patient and confirmed laterality is right on 03/25/24 at 12:50 PM Karel Chadwick DO

## 2024-03-28 ENCOUNTER — DOCUMENTATION (OUTPATIENT)
Dept: FAMILY MEDICINE | Facility: CLINIC | Age: 74
End: 2024-03-28
Payer: COMMERCIAL

## 2024-03-28 NOTE — PROGRESS NOTES
Christa Santo MA has completed a chart review for Keri Hilton and have determined that the care gap has been satisfied.    Care Gap Source: [facility]    Care Gap(s) Identified: Breast Cancer Screening    Chart Review Completed: Yes              Mammogram completed on 2/16/24. Recommended additional imaging and biopsy which was also completed.

## 2024-04-01 ENCOUNTER — OFFICE VISIT (OUTPATIENT)
Dept: BEHAVIORAL HEALTH | Facility: CLINIC | Age: 74
End: 2024-04-01
Payer: COMMERCIAL

## 2024-04-01 DIAGNOSIS — F41.9 ANXIETY: Primary | ICD-10-CM

## 2024-04-01 PROCEDURE — 90834 PSYTX W PT 45 MINUTES: CPT

## 2024-04-01 NOTE — PROGRESS NOTES
"[facility] - Psychotherapy Follow-up Visit Note  Visit number: 32    Visit Type Performed: In-office     Keri Hilton presented today for a behavioral health visit.      SUBJECTIVE     Symptoms  Depression symptoms: fatigue/lack of energy  Anxiety symptoms: mild anxiety/worry, irritability and sleep disturbance  Additional reported symptoms: NA    OBJECTIVE     Mental Status Evaluation  Patient's mood and affect were consistent with the context, and consistent with their baseline: Yes   Comments: Calm and pleasant; awake and alert; oriented to person, place, and time    Suicidal Ideation/Homicidal Ideation Risk Assessment: not assessed. If not assessed, reason: Pt did not endorse any changes in risk or protective factors and none were observed.      Plan for Safety-   N/A:  Risk is assessed to be minimal; therefore, developing a safety plan is not indicated at this time.    Interventions  Cognitive Behavior Therapy and Empathic Listening and Validation  We processed recent circumstances reinforcing the need for stricter boundaries between Patient and one of her brothers furthering their estrangement. We processed progress in her efforts to quit vaping as well as her efforts to drive on her own. We processed recent positive news following her breast biopsy. We processed her previous state of euphoria resulting in seizure like activity and how that level of \"happiness\" is not sustainable whereas experiencing a state of contentment could be a more achievable goal.      Psychotropic medications: no known adherence challenges, effective    Current Outpatient Medications   Medication Sig Dispense Refill    ARIPiprazole (ABILIFY) 2 mg tablet Take 1 tablet (2 mg total) by mouth daily. 90 tablet 0    aspirin 81 mg enteric coated tablet Take 81 mg by mouth daily.      atorvastatin (LIPITOR) 80 mg tablet Take 80 mg by mouth daily.       busPIRone (BUSPAR) 10 mg tablet Take 2 tablets (20 mg total) by mouth " 3 (three) times a day. 540 tablet 0    diphenhydrAMINE (BENADRYL) 25 mg capsule Take 25 mg by mouth nightly as needed for sleep.      ezetimibe (ZETIA) 10 mg tablet Take 10 mg by mouth daily.      FLUoxetine (PROzac) 10 mg capsule Take 1 capsule (10 mg total) by mouth every morning. 30 capsule 1    gabapentin (NEURONTIN) 600 mg tablet Take 1 tablet (600 mg total) by mouth every morning AND 1.5 tablets (900 mg total) nightly.       No current facility-administered medications for this visit.       ASSESSMENT       Progress  Patient's progress toward their goals is generally improving as Patient further processed improvement in symptomology and continued efforts to address situations that create tension or stress in her every day life.   Patient's symptomology is stable, well controlled. Patient continues to deny nonepileptic seizure activity.    PLAN     Goals:  Development of skills for management of nonepileptic seizures    -Patient hopes to be able to return to driving     Recommendations  Individual Therapy  45 minutes  2-3 times monthly     Next visit plan:  -Assess stressors that arose between sessions    I spent  40 minutes on this date of service performing the following activities: providing counseling and education.

## 2024-04-08 DIAGNOSIS — G62.9 NEUROPATHY: ICD-10-CM

## 2024-04-08 RX ORDER — GABAPENTIN 600 MG/1
TABLET ORAL
Qty: 225 TABLET | Refills: 1 | Status: SHIPPED | OUTPATIENT
Start: 2024-04-08 | End: 2024-07-14 | Stop reason: SDUPTHER

## 2024-04-08 NOTE — TELEPHONE ENCOUNTER
Medicine Refill Request    Last Office Visit: 3/15/2024   Last Consult Visit: Visit date not found  Last Telemedicine Visit: Visit date not found    Next Appointment: 9/16/2024      Current Outpatient Medications:     ARIPiprazole (ABILIFY) 2 mg tablet, Take 1 tablet (2 mg total) by mouth daily., Disp: 90 tablet, Rfl: 0    aspirin 81 mg enteric coated tablet, Take 81 mg by mouth daily., Disp: , Rfl:     atorvastatin (LIPITOR) 80 mg tablet, Take 80 mg by mouth daily. , Disp: , Rfl:     busPIRone (BUSPAR) 10 mg tablet, Take 2 tablets (20 mg total) by mouth 3 (three) times a day., Disp: 540 tablet, Rfl: 0    diphenhydrAMINE (BENADRYL) 25 mg capsule, Take 25 mg by mouth nightly as needed for sleep., Disp: , Rfl:     ezetimibe (ZETIA) 10 mg tablet, Take 10 mg by mouth daily., Disp: , Rfl:     FLUoxetine (PROzac) 10 mg capsule, Take 1 capsule (10 mg total) by mouth every morning., Disp: 30 capsule, Rfl: 1    gabapentin (NEURONTIN) 600 mg tablet, Take 1 tablet (600 mg total) by mouth every morning AND 1.5 tablets (900 mg total) nightly., Disp: , Rfl:       BP Readings from Last 3 Encounters:   03/15/24 112/66   09/28/23 100/62   07/24/23 136/64       Recent Lab results:  Lab Results   Component Value Date    CHOL 118 11/02/2023   ,   Lab Results   Component Value Date    HDL 39 (L) 11/02/2023   ,   Lab Results   Component Value Date    LDLCALC 51 11/02/2023   ,   Lab Results   Component Value Date    TRIG 168 (H) 11/02/2023        Lab Results   Component Value Date    GLUCOSE 109 (H) 11/02/2023   ,   Lab Results   Component Value Date    HGBA1C 5.8 (H) 01/26/2024         Lab Results   Component Value Date    CREATININE 0.57 11/02/2023       Lab Results   Component Value Date    TSH 2.050 08/18/2022           Lab Results   Component Value Date    HGBA1C 5.8 (H) 01/26/2024

## 2024-04-15 ENCOUNTER — OFFICE VISIT (OUTPATIENT)
Dept: BEHAVIORAL HEALTH | Facility: CLINIC | Age: 74
End: 2024-04-15
Payer: COMMERCIAL

## 2024-04-15 DIAGNOSIS — F41.9 ANXIETY: Primary | ICD-10-CM

## 2024-04-15 PROCEDURE — 90834 PSYTX W PT 45 MINUTES: CPT

## 2024-04-15 NOTE — PROGRESS NOTES
[facility] - Psychotherapy Follow-up Visit Note  Visit number: 33    Visit Type Performed: In-office     Keri Hilton presented today for a behavioral health visit.      SUBJECTIVE     Symptoms  Depression symptoms: fatigue/lack of energy  Anxiety symptoms: mild anxiety/worry, irritability and sleep disturbance  Additional reported symptoms: NA    OBJECTIVE     Mental Status Evaluation  Patient's mood and affect were consistent with the context, and consistent with their baseline: Yes   Comments: Calm and pleasant; awake and alert; oriented to person, place, and time    Suicidal Ideation/Homicidal Ideation Risk Assessment: not assessed. If not assessed, reason: Pt did not endorse any changes in risk or protective factors and none were observed.      Plan for Safety-   N/A:  Risk is assessed to be minimal; therefore, developing a safety plan is not indicated at this time.    Interventions  Anxiety Reduction Techniques and Empathic Listening and Validation  We processed difficulty controlling worry associated with her son's lack of employment and frustrations that he did not inform her of his new employment this week. We discussed how to apply skills of coping with uncertainty and coping with things outside of her control to this situation. We processed progress in determining whether Patient and her  will meet the criteria for government housing. We processed how her 's overwhelm with some of the tasks that will ease Patient's anxiety might involve further involvement from Patient to help him get started.     Psychotropic medications: no known adherence challenges, effective    Current Outpatient Medications   Medication Sig Dispense Refill    ARIPiprazole (ABILIFY) 2 mg tablet Take 1 tablet (2 mg total) by mouth daily. 90 tablet 0    aspirin 81 mg enteric coated tablet Take 81 mg by mouth daily.      atorvastatin (LIPITOR) 80 mg tablet Take 80 mg by mouth daily.       busPIRone  (BUSPAR) 10 mg tablet Take 2 tablets (20 mg total) by mouth 3 (three) times a day. 540 tablet 0    diphenhydrAMINE (BENADRYL) 25 mg capsule Take 25 mg by mouth nightly as needed for sleep.      ezetimibe (ZETIA) 10 mg tablet Take 10 mg by mouth daily.      FLUoxetine (PROzac) 10 mg capsule Take 1 capsule (10 mg total) by mouth every morning. 30 capsule 1    gabapentin (NEURONTIN) 600 mg tablet Take 1 tablet (600 mg total) by mouth every morning AND 1.5 tablets (900 mg total) nightly. 225 tablet 1     No current facility-administered medications for this visit.       ASSESSMENT       Progress  Patient's progress toward their goals is generally improving as Patient processed situations that have impacted her anxiety since last session.  Patient's symptomology is stable, well controlled. Patient continues to deny nonepileptic seizure activity.    PLAN     Goals:  Development of skills for management of nonepileptic seizures    -Patient hopes to be able to return to driving     Recommendations  Individual Therapy  45 minutes  2-3 times monthly     Next visit plan:  -Assess stressors that arose between sessions    I spent  40 minutes on this date of service performing the following activities: providing counseling and education.

## 2024-04-22 ENCOUNTER — OFFICE VISIT (OUTPATIENT)
Dept: PSYCHIATRY | Facility: HOSPITAL | Age: 74
End: 2024-04-22
Attending: PSYCHIATRY & NEUROLOGY
Payer: COMMERCIAL

## 2024-04-22 DIAGNOSIS — F44.5 PSYCHOGENIC NONEPILEPTIC SEIZURE: ICD-10-CM

## 2024-04-22 DIAGNOSIS — F32.A DEPRESSIVE DISORDER: ICD-10-CM

## 2024-04-22 DIAGNOSIS — F41.1 GENERALIZED ANXIETY DISORDER: ICD-10-CM

## 2024-04-22 PROCEDURE — 99214 OFFICE O/P EST MOD 30 MIN: CPT | Performed by: PSYCHIATRY & NEUROLOGY

## 2024-04-22 RX ORDER — FLUOXETINE HYDROCHLORIDE 20 MG/1
20 CAPSULE ORAL EVERY MORNING
Qty: 30 CAPSULE | Refills: 1 | Status: SHIPPED | OUTPATIENT
Start: 2024-04-22 | End: 2024-06-23 | Stop reason: SDUPTHER

## 2024-04-22 RX ORDER — ARIPIPRAZOLE 2 MG/1
2 TABLET ORAL DAILY
Qty: 90 TABLET | Refills: 0 | Status: SHIPPED | OUTPATIENT
Start: 2024-04-22 | End: 2024-06-03 | Stop reason: SDUPTHER

## 2024-04-22 RX ORDER — BUSPIRONE HYDROCHLORIDE 10 MG/1
20 TABLET ORAL 3 TIMES DAILY
Qty: 540 TABLET | Refills: 0 | Status: SHIPPED | OUTPATIENT
Start: 2024-04-22 | End: 2024-07-15 | Stop reason: SDUPTHER

## 2024-04-22 ASSESSMENT — ABNORMAL INVOLUNTARY MOVEMENT SCALE (AIMS)
AIMS_SEVERITY: NONE, NORMAL
TOTAL_SCORE: 0
CURRENT_PROBLEMS_TEETH_DENTURES: NO
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
## 2024-05-06 ENCOUNTER — OFFICE VISIT (OUTPATIENT)
Dept: BEHAVIORAL HEALTH | Facility: CLINIC | Age: 74
End: 2024-05-06
Payer: COMMERCIAL

## 2024-05-06 DIAGNOSIS — F41.9 ANXIETY: Primary | ICD-10-CM

## 2024-05-06 PROCEDURE — 90834 PSYTX W PT 45 MINUTES: CPT

## 2024-05-06 NOTE — PROGRESS NOTES
[facility] - Psychotherapy Follow-up Visit Note  Visit number: 34    Visit Type Performed: In-office     Keri Hilton presented today for a behavioral health visit.      SUBJECTIVE     Symptoms  Depression symptoms: depressed mood, anhedonia, and fatigue/lack of energy  Anxiety symptoms: mild anxiety/worry, irritability and sleep disturbance  Additional reported symptoms: NA    OBJECTIVE     Mental Status Evaluation  Patient's mood and affect were consistent with the context, and consistent with their baseline: Yes   Comments: Calm and pleasant; awake and alert; oriented to person, place, and time    Suicidal Ideation/Homicidal Ideation Risk Assessment: not assessed. If not assessed, reason: Pt did not endorse any changes in risk or protective factors and none were observed.      Plan for Safety-   N/A:  Risk is assessed to be minimal; therefore, developing a safety plan is not indicated at this time.    Interventions  Acceptance and Adjustment and Empathic Listening and Validation  We processed slow but steady progress in different areas of Patient's life including applying for government assisted housing, obtaining legal advice for her 's property, more frequent and solo driving opportunities leading to more time with her granddaughters, continued commitment to stop vaping, and potential improvement in the relationship between her sons. We processed continued symptoms associated with depression and how her current situations, while improving, have not changed enough yet to make a big impact on her overall mood. We discussed the importance of identifying moments in each day that were positive or that she is grateful for to help combat some of the symptoms of depression.     Psychotropic medications: no known adherence challenges, effective    Current Outpatient Medications   Medication Sig Dispense Refill    ARIPiprazole (ABILIFY) 2 mg tablet Take 1 tablet (2 mg total) by mouth daily. 90  tablet 0    aspirin 81 mg enteric coated tablet Take 81 mg by mouth daily.      atorvastatin (LIPITOR) 80 mg tablet Take 80 mg by mouth daily.       busPIRone (BUSPAR) 10 mg tablet Take 2 tablets (20 mg total) by mouth 3 (three) times a day. 540 tablet 0    diphenhydrAMINE (BENADRYL) 25 mg capsule Take 25 mg by mouth nightly as needed for sleep.      ezetimibe (ZETIA) 10 mg tablet Take 10 mg by mouth daily.      FLUoxetine (PROzac) 20 mg capsule Take 1 capsule (20 mg total) by mouth every morning. 30 capsule 1    gabapentin (NEURONTIN) 600 mg tablet Take 1 tablet (600 mg total) by mouth every morning AND 1.5 tablets (900 mg total) nightly. 225 tablet 1     No current facility-administered medications for this visit.       ASSESSMENT       Progress  Patient's progress toward their goals is generally improving as Patient processed progress in several areas for Patient and her family.   Patient's symptomology is stable, well controlled. Patient continues to deny nonepileptic seizure activity.    PLAN     Goals:  Development of skills for management of nonepileptic seizures    -Patient hopes to be able to return to driving     Recommendations  Individual Therapy  45 minutes monthly due to symptomology improvement    Next visit plan:  -Assess stressors that arose between sessions    I spent  40 minutes on this date of service performing the following activities: providing counseling and education.

## 2024-05-31 ENCOUNTER — OFFICE VISIT (OUTPATIENT)
Dept: BEHAVIORAL HEALTH | Facility: CLINIC | Age: 74
End: 2024-05-31
Payer: COMMERCIAL

## 2024-05-31 DIAGNOSIS — F41.9 ANXIETY: Primary | ICD-10-CM

## 2024-05-31 PROCEDURE — 90834 PSYTX W PT 45 MINUTES: CPT

## 2024-06-03 RX ORDER — FLUOXETINE HYDROCHLORIDE 20 MG/1
20 CAPSULE ORAL EVERY MORNING
Qty: 30 CAPSULE | Refills: 1 | Status: CANCELLED | OUTPATIENT
Start: 2024-06-03 | End: 2024-08-02

## 2024-06-03 RX ORDER — BUSPIRONE HYDROCHLORIDE 10 MG/1
20 TABLET ORAL 3 TIMES DAILY
Qty: 540 TABLET | Refills: 0 | Status: CANCELLED | OUTPATIENT
Start: 2024-06-03 | End: 2024-09-01

## 2024-06-04 RX ORDER — ARIPIPRAZOLE 2 MG/1
2 TABLET ORAL DAILY
Qty: 90 TABLET | Refills: 0 | Status: SHIPPED | OUTPATIENT
Start: 2024-06-04 | End: 2024-07-15 | Stop reason: SDUPTHER

## 2024-06-04 NOTE — TELEPHONE ENCOUNTER
Medicine Refill Request    Last Telemedicine Visit: 4/22/2024 Patricia Vinson MD     Next Appointment: 7/3/2024 Patricia Vinson MD       Current Outpatient Medications:     ARIPiprazole (ABILIFY) 2 mg tablet, Take 1 tablet (2 mg total) by mouth daily., Disp: 90 tablet, Rfl: 0    aspirin 81 mg enteric coated tablet, Take 81 mg by mouth daily., Disp: , Rfl:     atorvastatin (LIPITOR) 80 mg tablet, Take 80 mg by mouth daily. , Disp: , Rfl:     busPIRone (BUSPAR) 10 mg tablet, Take 2 tablets (20 mg total) by mouth 3 (three) times a day., Disp: 540 tablet, Rfl: 0    diphenhydrAMINE (BENADRYL) 25 mg capsule, Take 25 mg by mouth nightly as needed for sleep., Disp: , Rfl:     ezetimibe (ZETIA) 10 mg tablet, Take 10 mg by mouth daily., Disp: , Rfl:     FLUoxetine (PROzac) 20 mg capsule, Take 1 capsule (20 mg total) by mouth every morning., Disp: 30 capsule, Rfl: 1    gabapentin (NEURONTIN) 600 mg tablet, Take 1 tablet (600 mg total) by mouth every morning AND 1.5 tablets (900 mg total) nightly., Disp: 225 tablet, Rfl: 1      BP Readings from Last 3 Encounters:   03/15/24 112/66   09/28/23 100/62   08/18/23 121/76       Recent Lab results:  Lab Results   Component Value Date    CHOL 118 11/02/2023   ,   Lab Results   Component Value Date    HDL 39 (L) 11/02/2023   ,   Lab Results   Component Value Date    LDLCALC 51 11/02/2023   ,   Lab Results   Component Value Date    TRIG 168 (H) 11/02/2023        Lab Results   Component Value Date    GLUCOSE 109 (H) 11/02/2023   ,   Lab Results   Component Value Date    HGBA1C 5.8 (H) 01/26/2024         Lab Results   Component Value Date    CREATININE 0.57 11/02/2023       Lab Results   Component Value Date    TSH 2.050 08/18/2022           Lab Results   Component Value Date    HGBA1C 5.8 (H) 01/26/2024     Total Time: 9     Patient presents with frequent falls and complaint of feeling off balance. Denies any heart palpitations prior to falls, no seizure activity, tongue biting, inconstance episodes. Patient's sister-in law endorses patient not taking care of himself, likely poor PO intake, Baseline essential tremor of BL UE, does not appear to have wide based gate in ED. Differential includes orthostatic hypotension vs vertigo vs poor nutrition    Plan:  - f/u PT  - f/u orthostats  - f/u EKG  - f/u B12, folate, TSH  - consider neuro consult Patient presents with frequent falls and complaint of feeling off balance. Denies any heart palpitations prior to falls, no seizure activity, tongue biting, inconstance episodes. Patient's sister-in law endorses patient not taking care of himself, likely poor PO intake, Baseline essential tremor of BL UE, does not appear to have wide based gate in ED. Differential includes orthostatic hypotension vs vertigo vs poor nutrition  - stroke protocol done in ED negative    Plan:  - f/u PT  - f/u orthostats  - f/u EKG  - f/u B12, folate, TSH  - consider neuro consult vs establish movement disorder Neurologist follow up Patient presents with frequent falls and complaint of feeling off balance. Denies any heart palpitations prior to falls, no seizure activity, tongue biting, inconstance episodes. Patient's sister-in law endorses patient not taking care of himself, likely poor PO intake, Baseline essential tremor of BL UE, does not appear to have wide based gate in ED. Differential includes orthostatic hypotension vs vertigo vs undiagnosed parkinsons vs poor nutrition  - stroke protocol done in ED negative    Plan:  - f/u PT  - f/u orthostats  - f/u EKG  - f/u B12, folate, TSH  - consider neuro consult vs establish movement disorder Neurologist follow up

## 2024-06-24 ENCOUNTER — OFFICE VISIT (OUTPATIENT)
Dept: BEHAVIORAL HEALTH | Facility: CLINIC | Age: 74
End: 2024-06-24
Payer: COMMERCIAL

## 2024-06-24 DIAGNOSIS — F41.9 ANXIETY: Primary | ICD-10-CM

## 2024-06-24 PROCEDURE — 90834 PSYTX W PT 45 MINUTES: CPT

## 2024-06-24 RX ORDER — FLUOXETINE HYDROCHLORIDE 20 MG/1
20 CAPSULE ORAL EVERY MORNING
Qty: 30 CAPSULE | Refills: 1 | Status: SHIPPED | OUTPATIENT
Start: 2024-06-24 | End: 2024-07-15 | Stop reason: SDUPTHER

## 2024-06-24 NOTE — TELEPHONE ENCOUNTER
Medicine Refill Request  Last Telemedicine Visit: 4/22/2024 Patricia Vinson MD     Next Appointment: 7/3/2024  Patricia Vinson MD       Current Outpatient Medications:     ARIPiprazole (ABILIFY) 2 mg tablet, Take 1 tablet (2 mg total) by mouth daily., Disp: 90 tablet, Rfl: 0    aspirin 81 mg enteric coated tablet, Take 81 mg by mouth daily., Disp: , Rfl:     atorvastatin (LIPITOR) 80 mg tablet, Take 80 mg by mouth daily. , Disp: , Rfl:     busPIRone (BUSPAR) 10 mg tablet, Take 2 tablets (20 mg total) by mouth 3 (three) times a day., Disp: 540 tablet, Rfl: 0    diphenhydrAMINE (BENADRYL) 25 mg capsule, Take 25 mg by mouth nightly as needed for sleep., Disp: , Rfl:     ezetimibe (ZETIA) 10 mg tablet, Take 10 mg by mouth daily., Disp: , Rfl:     FLUoxetine (PROzac) 20 mg capsule, Take 1 capsule (20 mg total) by mouth every morning., Disp: 30 capsule, Rfl: 1    gabapentin (NEURONTIN) 600 mg tablet, Take 1 tablet (600 mg total) by mouth every morning AND 1.5 tablets (900 mg total) nightly., Disp: 225 tablet, Rfl: 1      BP Readings from Last 3 Encounters:   03/15/24 112/66   09/28/23 100/62   08/18/23 121/76       Recent Lab results:  Lab Results   Component Value Date    CHOL 118 11/02/2023   ,   Lab Results   Component Value Date    HDL 39 (L) 11/02/2023   ,   Lab Results   Component Value Date    LDLCALC 51 11/02/2023   ,   Lab Results   Component Value Date    TRIG 168 (H) 11/02/2023        Lab Results   Component Value Date    GLUCOSE 109 (H) 11/02/2023   ,   Lab Results   Component Value Date    HGBA1C 5.8 (H) 01/26/2024         Lab Results   Component Value Date    CREATININE 0.57 11/02/2023       Lab Results   Component Value Date    TSH 2.050 08/18/2022           Lab Results   Component Value Date    HGBA1C 5.8 (H) 01/26/2024     Total Time: 8
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
## 2024-06-26 ENCOUNTER — TELEPHONE (OUTPATIENT)
Dept: PSYCHIATRY | Facility: HOSPITAL | Age: 74
End: 2024-06-26
Payer: COMMERCIAL

## 2024-07-14 DIAGNOSIS — G62.9 NEUROPATHY: ICD-10-CM

## 2024-07-15 ENCOUNTER — TELEMEDICINE (OUTPATIENT)
Dept: PSYCHIATRY | Facility: HOSPITAL | Age: 74
End: 2024-07-15
Attending: PSYCHIATRY & NEUROLOGY
Payer: COMMERCIAL

## 2024-07-15 DIAGNOSIS — F44.5 PSYCHOGENIC NONEPILEPTIC SEIZURE: ICD-10-CM

## 2024-07-15 DIAGNOSIS — F41.1 GENERALIZED ANXIETY DISORDER: ICD-10-CM

## 2024-07-15 DIAGNOSIS — F32.A DEPRESSIVE DISORDER: ICD-10-CM

## 2024-07-15 PROCEDURE — 99214 OFFICE O/P EST MOD 30 MIN: CPT | Mod: 95 | Performed by: PSYCHIATRY & NEUROLOGY

## 2024-07-15 RX ORDER — BUSPIRONE HYDROCHLORIDE 10 MG/1
20 TABLET ORAL 3 TIMES DAILY
Qty: 540 TABLET | Refills: 0 | Status: SHIPPED | OUTPATIENT
Start: 2024-07-15 | End: 2024-10-17 | Stop reason: SDUPTHER

## 2024-07-15 RX ORDER — ARIPIPRAZOLE 2 MG/1
2 TABLET ORAL DAILY
Qty: 90 TABLET | Refills: 0 | Status: SHIPPED | OUTPATIENT
Start: 2024-07-15 | End: 2024-10-28 | Stop reason: SDUPTHER

## 2024-07-15 RX ORDER — GABAPENTIN 600 MG/1
TABLET ORAL
Qty: 225 TABLET | Refills: 1 | Status: SHIPPED | OUTPATIENT
Start: 2024-07-15 | End: 2025-01-08 | Stop reason: SDUPTHER

## 2024-07-15 RX ORDER — FLUOXETINE HYDROCHLORIDE 20 MG/1
20 CAPSULE ORAL EVERY MORNING
Qty: 90 CAPSULE | Refills: 0 | Status: SHIPPED | OUTPATIENT
Start: 2024-07-15 | End: 2024-10-18 | Stop reason: SDUPTHER
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
## 2024-07-15 NOTE — TELEPHONE ENCOUNTER
Medicine Refill Request    Last Office Visit: 3/15/2024   Last Consult Visit: Visit date not found  Last Telemedicine Visit: Visit date not found    Next Appointment: 9/16/2024      Current Outpatient Medications:     ARIPiprazole (ABILIFY) 2 mg tablet, Take 1 tablet (2 mg total) by mouth daily., Disp: 90 tablet, Rfl: 0    aspirin 81 mg enteric coated tablet, Take 81 mg by mouth daily., Disp: , Rfl:     atorvastatin (LIPITOR) 80 mg tablet, Take 80 mg by mouth daily. , Disp: , Rfl:     busPIRone (BUSPAR) 10 mg tablet, Take 2 tablets (20 mg total) by mouth 3 (three) times a day., Disp: 540 tablet, Rfl: 0    diphenhydrAMINE (BENADRYL) 25 mg capsule, Take 25 mg by mouth nightly as needed for sleep., Disp: , Rfl:     ezetimibe (ZETIA) 10 mg tablet, Take 10 mg by mouth daily., Disp: , Rfl:     FLUoxetine (PROzac) 20 mg capsule, Take 1 capsule (20 mg total) by mouth every morning., Disp: 30 capsule, Rfl: 1    gabapentin (NEURONTIN) 600 mg tablet, Take 1 tablet (600 mg total) by mouth every morning AND 1.5 tablets (900 mg total) nightly., Disp: 225 tablet, Rfl: 1      BP Readings from Last 3 Encounters:   03/15/24 112/66   09/28/23 100/62   07/24/23 136/64       Recent Lab results:  Lab Results   Component Value Date    CHOL 118 11/02/2023   ,   Lab Results   Component Value Date    HDL 39 (L) 11/02/2023   ,   Lab Results   Component Value Date    LDLCALC 51 11/02/2023   ,   Lab Results   Component Value Date    TRIG 168 (H) 11/02/2023        Lab Results   Component Value Date    GLUCOSE 109 (H) 11/02/2023   ,   Lab Results   Component Value Date    HGBA1C 5.8 (H) 01/26/2024         Lab Results   Component Value Date    CREATININE 0.57 11/02/2023       Lab Results   Component Value Date    TSH 2.050 08/18/2022           Lab Results   Component Value Date    HGBA1C 5.8 (H) 01/26/2024

## 2024-07-29 ENCOUNTER — CONSULT (OUTPATIENT)
Dept: FAMILY MEDICINE | Facility: CLINIC | Age: 74
End: 2024-07-29
Payer: COMMERCIAL

## 2024-07-29 VITALS
DIASTOLIC BLOOD PRESSURE: 72 MMHG | HEIGHT: 65 IN | TEMPERATURE: 97.5 F | BODY MASS INDEX: 25.92 KG/M2 | SYSTOLIC BLOOD PRESSURE: 110 MMHG | HEART RATE: 67 BPM | WEIGHT: 155.6 LBS | RESPIRATION RATE: 16 BRPM | OXYGEN SATURATION: 98 %

## 2024-07-29 DIAGNOSIS — F44.5 PSYCHOGENIC NONEPILEPTIC SEIZURE: ICD-10-CM

## 2024-07-29 DIAGNOSIS — Z01.818 PREOPERATIVE CLEARANCE: Primary | ICD-10-CM

## 2024-07-29 DIAGNOSIS — G56.03 CARPAL TUNNEL SYNDROME ON BOTH SIDES: ICD-10-CM

## 2024-07-29 DIAGNOSIS — E78.5 HYPERLIPIDEMIA, UNSPECIFIED HYPERLIPIDEMIA TYPE: ICD-10-CM

## 2024-07-29 DIAGNOSIS — Z01.818 PRE-OP TESTING: ICD-10-CM

## 2024-07-29 PROBLEM — R56.9 SEIZURE-LIKE ACTIVITY (CMS/HCC): Status: RESOLVED | Noted: 2021-10-12 | Resolved: 2024-07-29

## 2024-07-29 PROCEDURE — 93000 ELECTROCARDIOGRAM COMPLETE: CPT | Performed by: FAMILY MEDICINE

## 2024-07-29 PROCEDURE — 3074F SYST BP LT 130 MM HG: CPT | Performed by: FAMILY MEDICINE

## 2024-07-29 PROCEDURE — 3078F DIAST BP <80 MM HG: CPT | Performed by: FAMILY MEDICINE

## 2024-07-29 PROCEDURE — 3008F BODY MASS INDEX DOCD: CPT | Performed by: FAMILY MEDICINE

## 2024-07-29 PROCEDURE — 99214 OFFICE O/P EST MOD 30 MIN: CPT | Performed by: FAMILY MEDICINE

## 2024-07-29 ASSESSMENT — ENCOUNTER SYMPTOMS
HEMATURIA: 0
DIZZINESS: 0
PALPITATIONS: 0
NUMBNESS: 0
ABDOMINAL PAIN: 0
CHEST TIGHTNESS: 0
CHOKING: 0
SINUS PAIN: 0
JOINT SWELLING: 0
DIAPHORESIS: 0
EYE REDNESS: 0
BACK PAIN: 0
CHILLS: 0
TROUBLE SWALLOWING: 0
FATIGUE: 0
FEVER: 0
NAUSEA: 0
LIGHT-HEADEDNESS: 0
DIFFICULTY URINATING: 0
SORE THROAT: 0
UNEXPECTED WEIGHT CHANGE: 0
HEADACHES: 0
WHEEZING: 0
VOMITING: 0
ABDOMINAL DISTENTION: 0
DYSURIA: 0
SINUS PRESSURE: 0
APPETITE CHANGE: 0
SHORTNESS OF BREATH: 0
COUGH: 0
DIARRHEA: 0
FLANK PAIN: 0
FREQUENCY: 0
WEAKNESS: 0
RHINORRHEA: 0
CONSTIPATION: 0
POLYDIPSIA: 0

## 2024-07-29 NOTE — ASSESSMENT & PLAN NOTE
Scheduled for left carpal tunnel surgery on 8/23 with Dr. Frazier.  EKG reviewed.  Patient is an acceptable risk for surgery.

## 2024-07-29 NOTE — PROGRESS NOTES
"    Subjective      Patient ID: Keri Hilton is a 73 y.o. female.  1950      HPI    Here today for a preop visit.  Scheduled for left carpal tunnel surgery on 8/23 with Dr. Frazier.  No concerns.    The following have been reviewed and updated as appropriate in this visit:   Tobacco  Allergies  Meds  Problems  Med Hx  Surg Hx  Fam Hx  Soc   Hx      Review of Systems   Constitutional:  Negative for appetite change, chills, diaphoresis, fatigue, fever and unexpected weight change.   HENT:  Negative for congestion, ear discharge, ear pain, nosebleeds, postnasal drip, rhinorrhea, sinus pressure, sinus pain, sneezing, sore throat and trouble swallowing.    Eyes:  Negative for redness and visual disturbance.   Respiratory:  Negative for cough, choking, chest tightness, shortness of breath and wheezing.    Cardiovascular:  Negative for chest pain, palpitations and leg swelling.   Gastrointestinal:  Negative for abdominal distention, abdominal pain, constipation, diarrhea, nausea and vomiting.   Endocrine: Negative for polydipsia.   Genitourinary:  Negative for difficulty urinating, dysuria, flank pain, frequency, genital sores, hematuria and urgency.   Musculoskeletal:  Negative for back pain and joint swelling.   Skin:  Negative for rash.   Neurological:  Negative for dizziness, weakness, light-headedness, numbness and headaches.       Objective     Vitals:    07/29/24 1137   BP: 110/72   BP Location: Left upper arm   Patient Position: Sitting   Pulse: 67   Resp: 16   Temp: 36.4 °C (97.5 °F)   TempSrc: Temporal   SpO2: 98%   Weight: 70.6 kg (155 lb 9.6 oz)   Height: 1.651 m (5' 5\")     Body mass index is 25.89 kg/m².    Physical Exam  Vitals and nursing note reviewed.   Constitutional:       General: She is not in acute distress.     Appearance: Normal appearance. She is well-developed and normal weight. She is not diaphoretic.   HENT:      Head: Normocephalic and atraumatic.      Right Ear: Tympanic membrane, " ear canal and external ear normal.      Left Ear: Tympanic membrane, ear canal and external ear normal.      Nose: Nose normal.      Mouth/Throat:      Pharynx: No oropharyngeal exudate.   Eyes:      General:         Right eye: No discharge.         Left eye: No discharge.      Conjunctiva/sclera: Conjunctivae normal.      Pupils: Pupils are equal, round, and reactive to light.   Neck:      Thyroid: No thyromegaly.   Cardiovascular:      Rate and Rhythm: Normal rate and regular rhythm.      Heart sounds: Normal heart sounds. No murmur heard.     No friction rub. No gallop.   Pulmonary:      Effort: Pulmonary effort is normal. No respiratory distress.      Breath sounds: Normal breath sounds. No stridor. No wheezing, rhonchi or rales.   Abdominal:      General: Bowel sounds are normal. There is no distension.      Palpations: Abdomen is soft. There is no mass.      Tenderness: There is no abdominal tenderness. There is no guarding or rebound.      Hernia: No hernia is present.   Musculoskeletal:         General: No deformity. Normal range of motion.      Cervical back: Normal range of motion.   Lymphadenopathy:      Cervical: No cervical adenopathy.   Skin:     General: Skin is warm.      Findings: No rash.   Neurological:      Mental Status: She is alert and oriented to person, place, and time.         Assessment/Plan   Diagnoses and all orders for this visit:    Preoperative clearance (Primary)  Assessment & Plan:  Scheduled for left carpal tunnel surgery on 8/23 with Dr. Frazier.  EKG reviewed.  Patient is an acceptable risk for surgery.      Carpal tunnel syndrome on both sides  Assessment & Plan:  See above.      Psychogenic nonepileptic seizure  Assessment & Plan:  Stable. Following with psychiatry.      Hyperlipidemia, unspecified hyperlipidemia type  Assessment & Plan:  Continue medication.      Pre-op testing  -     ECG 12 LEAD OFFICE PERFORMED

## 2024-08-19 ENCOUNTER — OFFICE VISIT (OUTPATIENT)
Dept: BEHAVIORAL HEALTH | Facility: CLINIC | Age: 74
End: 2024-08-19
Payer: COMMERCIAL

## 2024-08-19 DIAGNOSIS — F41.9 ANXIETY: Primary | ICD-10-CM

## 2024-08-19 PROCEDURE — 90834 PSYTX W PT 45 MINUTES: CPT

## 2024-08-19 NOTE — PROGRESS NOTES
"[facility] - Psychotherapy Follow-up Visit Note  Visit number: 37    Visit Type Performed: In-office     Keri Hilton presented today for a behavioral health visit.      SUBJECTIVE     Symptoms  Depression symptoms: fatigue/lack of energy and feelings of guilt  Anxiety symptoms: moderate anxiety/worry, restless/\"on edge\", irritability, and sleep disturbance  Additional reported symptoms: NA    OBJECTIVE     Mental Status Evaluation  Patient's mood and affect were consistent with the context, and consistent with their baseline: Yes   Comments: Slightly anxious, Primarily calm and pleasant; awake and alert; oriented to person, place, and time    Suicidal Ideation/Homicidal Ideation Risk Assessment: not assessed. If not assessed, reason: Pt did not endorse any changes in risk or protective factors and none were observed.      Plan for Safety-   N/A:  Risk is assessed to be minimal; therefore, developing a safety plan is not indicated at this time.    Interventions  Acceptance and Adjustment, Cognitive Behavior Therapy, and Empathic Listening and Validation  We processed anxiety associated with housing stressors and the connection between Patient's last seizure and the couple's move last year. We processed guilt associated with past choices that differ from Christianity beliefs and discussed how to find peace with the very difficult choices she had to make. We processed factors that contribute to unresolved conflict leading to resentments with her previous marriage.     Psychotropic medications: no known adherence challenges, effective    Current Outpatient Medications   Medication Sig Dispense Refill    ARIPiprazole (ABILIFY) 2 mg tablet Take 1 tablet (2 mg total) by mouth daily. 90 tablet 0    aspirin 81 mg enteric coated tablet Take 81 mg by mouth daily.      atorvastatin (LIPITOR) 80 mg tablet Take 80 mg by mouth daily.       busPIRone (BUSPAR) 10 mg tablet Take 2 tablets (20 mg total) by mouth 3 " (three) times a day. 540 tablet 0    diphenhydrAMINE (BENADRYL) 25 mg capsule Take 25 mg by mouth nightly as needed for sleep.      ezetimibe (ZETIA) 10 mg tablet Take 10 mg by mouth daily.      FLUoxetine (PROzac) 20 mg capsule Take 1 capsule (20 mg total) by mouth every morning. 90 capsule 0    gabapentin (NEURONTIN) 600 mg tablet Take 1 tablet (600 mg total) by mouth every morning AND 1.5 tablets (900 mg total) nightly. 225 tablet 1     No current facility-administered medications for this visit.       ASSESSMENT       Progress  Patient's progress toward their goals is generally improving as Patient processed resentments or guilt towards herself and her ex- that continue to impact Patient's view of herself.   Patient's symptomology is stable, well controlled. Patient continues to deny nonepileptic seizure activity.    PLAN     Goals:  Development of skills for management of nonepileptic seizures    -Patient hopes to be able to return to driving     Recommendations  Individual Therapy  45 minutes monthly     Next visit plan:  -Assess stressors that arose between sessions      I spent  45 minutes on this date of service performing the following activities: providing counseling and education.

## 2024-09-16 ENCOUNTER — OFFICE VISIT (OUTPATIENT)
Dept: FAMILY MEDICINE | Facility: CLINIC | Age: 74
End: 2024-09-16
Payer: COMMERCIAL

## 2024-09-16 VITALS
WEIGHT: 158.2 LBS | BODY MASS INDEX: 26.36 KG/M2 | RESPIRATION RATE: 16 BRPM | TEMPERATURE: 97.9 F | SYSTOLIC BLOOD PRESSURE: 124 MMHG | DIASTOLIC BLOOD PRESSURE: 68 MMHG | HEIGHT: 65 IN | OXYGEN SATURATION: 99 % | HEART RATE: 60 BPM

## 2024-09-16 DIAGNOSIS — E78.5 HYPERLIPIDEMIA, UNSPECIFIED HYPERLIPIDEMIA TYPE: ICD-10-CM

## 2024-09-16 DIAGNOSIS — R13.10 DYSPHAGIA, UNSPECIFIED TYPE: Primary | ICD-10-CM

## 2024-09-16 DIAGNOSIS — R73.09 ABNORMAL GLUCOSE: ICD-10-CM

## 2024-09-16 PROBLEM — Z01.818 PREOPERATIVE CLEARANCE: Status: RESOLVED | Noted: 2024-07-29 | Resolved: 2024-09-16

## 2024-09-16 PROCEDURE — 3008F BODY MASS INDEX DOCD: CPT | Performed by: FAMILY MEDICINE

## 2024-09-16 PROCEDURE — 99214 OFFICE O/P EST MOD 30 MIN: CPT | Performed by: FAMILY MEDICINE

## 2024-09-16 PROCEDURE — 3078F DIAST BP <80 MM HG: CPT | Performed by: FAMILY MEDICINE

## 2024-09-16 PROCEDURE — 3074F SYST BP LT 130 MM HG: CPT | Performed by: FAMILY MEDICINE

## 2024-09-16 RX ORDER — OMEPRAZOLE 40 MG/1
40 CAPSULE, DELAYED RELEASE ORAL
Qty: 30 CAPSULE | Refills: 0 | Status: SHIPPED | OUTPATIENT
Start: 2024-09-16 | End: 2024-10-10 | Stop reason: SDUPTHER

## 2024-09-16 ASSESSMENT — ENCOUNTER SYMPTOMS
PALPITATIONS: 0
WHEEZING: 0
TROUBLE SWALLOWING: 0
SINUS PRESSURE: 0
RHINORRHEA: 0
SHORTNESS OF BREATH: 0
SINUS PAIN: 0
SORE THROAT: 0

## 2024-09-16 NOTE — PROGRESS NOTES
"    Subjective      Patient ID: Keri Hilton is a 74 y.o. female.  1950      HPI    Here today for choking concerns. Not every time but sometimes when swallows her own saliva will choke or when drinking. Not typically with food.  mentions that she does it while sleeping as well. No vomiting. No difficulty swallowing. Has been noticing it for a few months.    The following have been reviewed and updated as appropriate in this visit:   Allergies  Meds  Problems       Review of Systems   HENT:  Negative for congestion, ear pain, rhinorrhea, sinus pressure, sinus pain, sneezing, sore throat and trouble swallowing.    Respiratory:  Negative for shortness of breath and wheezing.    Cardiovascular:  Negative for chest pain, palpitations and leg swelling.       Objective     Vitals:    09/16/24 1209   BP: 124/68   BP Location: Left upper arm   Patient Position: Sitting   Pulse: 60   Resp: 16   Temp: 36.6 °C (97.9 °F)   TempSrc: Temporal   SpO2: 99%   Weight: 71.8 kg (158 lb 3.2 oz)   Height: 1.651 m (5' 5\")     Body mass index is 26.33 kg/m².    Physical Exam  Vitals and nursing note reviewed.   Constitutional:       General: She is not in acute distress.     Appearance: Normal appearance. She is not ill-appearing.   HENT:      Head: Normocephalic and atraumatic.      Mouth/Throat:      Mouth: Mucous membranes are moist.      Pharynx: No oropharyngeal exudate or posterior oropharyngeal erythema.   Cardiovascular:      Rate and Rhythm: Normal rate and regular rhythm.      Heart sounds: Normal heart sounds. No murmur heard.  Musculoskeletal:      Cervical back: Normal range of motion. No tenderness.   Lymphadenopathy:      Cervical: No cervical adenopathy.   Neurological:      Mental Status: She is alert.         Assessment & Plan  Dysphagia, unspecified type  Will get an UGI.  Start on omeprazole daily.  Discussed lifestyle modifications to help as well--avoiding spicy, fried foods etc. Avoiding eating a " couple hours before bedtime.  Will follow-up in a few weeks to see how she is doing and if not improving will consider a swallow evaluation.    Orders:    FLUOROSCOPY UPPER GI WITH AIR WITHOUT KUB; Future    Hyperlipidemia, unspecified hyperlipidemia type  Gave slip to check lab work. Continue medication.    Orders:    Lipid panel; Future    Comprehensive metabolic panel; Future    Lipid panel    Comprehensive metabolic panel    Abnormal glucose    Orders:    Hemoglobin A1c; Future    Hemoglobin A1c

## 2024-09-16 NOTE — ASSESSMENT & PLAN NOTE
Will get an UGI.  Start on omeprazole daily.  Discussed lifestyle modifications to help as well--avoiding spicy, fried foods etc. Avoiding eating a couple hours before bedtime.  Will follow-up in a few weeks to see how she is doing and if not improving will consider a swallow evaluation.    Orders:    FLUOROSCOPY UPPER GI WITH AIR WITHOUT KUB; Future

## 2024-09-16 NOTE — ASSESSMENT & PLAN NOTE
Gave slip to check lab work. Continue medication.    Orders:    Lipid panel; Future    Comprehensive metabolic panel; Future    Lipid panel    Comprehensive metabolic panel

## 2024-09-24 ENCOUNTER — HOSPITAL ENCOUNTER (OUTPATIENT)
Dept: RADIOLOGY | Facility: HOSPITAL | Age: 74
Discharge: HOME | End: 2024-09-24
Attending: FAMILY MEDICINE
Payer: COMMERCIAL

## 2024-09-24 DIAGNOSIS — R13.10 DYSPHAGIA, UNSPECIFIED TYPE: ICD-10-CM

## 2024-09-24 PROCEDURE — 74246 X-RAY XM UPR GI TRC 2CNTRST: CPT

## 2024-09-24 NOTE — RESULT ENCOUNTER NOTE
Keri,    I am covering for Dr. Casrto who is out of the office.  Your upper GI study did show some areas of stricture (narrowing), hiatal hernia, and reflux which may be causing your symptoms.  It looks like you are on omeprazole which I would recommend continuing as this may help.  I would also recommend setting up with a GI specialist for possible upper endoscopy to further diagnose and treat your condition.  Please reach out to [facility] at: [phone] to set up a consult with one of the physicians or physician assistants.  Let our office know if you have any trouble.    Dr. Mistry

## 2024-10-01 ENCOUNTER — OFFICE VISIT (OUTPATIENT)
Dept: BEHAVIORAL HEALTH | Facility: CLINIC | Age: 74
End: 2024-10-01
Payer: COMMERCIAL

## 2024-10-01 DIAGNOSIS — F41.9 ANXIETY: Primary | ICD-10-CM

## 2024-10-01 PROCEDURE — 90832 PSYTX W PT 30 MINUTES: CPT

## 2024-10-01 NOTE — DISCHARGE SUMMARY
Discharge Summary         Patient Name: Keri Hilton    : 1950    Treatment start date: 3/17/23    Date of last visit: 10/1/24           Discharge Reason: Treatment Episode Completed         Reason for admission and treatment: Patient had experienced racing thoughts, constant worry and feelings of being on edge throughout her life consistent with an Anxiety Disorder which resulted in the presentation of non-epileptic seizures and depressive symptoms leading Patient to seek mental health treatment.          Services offered and response to treatment: Patient and Therapist agreed to weekly sessions to develop skills for the management of anxiety, in particular the non-epileptic seizures. Patient remained consistent and engaged throughout treatment. While in treatment, Patient attended [facility] at [facility] to stabilize her medication and develop additional skills for anxiety management. Following this, Patient remained committed to outpatient therapy and as symptomology improved, session frequency decreased.          Client status - Condition upon discharge: See last visit note        Clinical concerns to be addressed in continued care: None identified.       Aftercare appointments: Continue with medication management at [facility].          Special Instructions: Patient verbalized understanding with how to re-engage in therapy with this Therapist as needed.            Mental Health Crisis Support:     [facility] Crisis Number: [phone]    [facility] Crisis Number: [phone]    [facility] Crisis Number: [phone]    [facility] Crisis Number: [phone]            Evelia San LCSW @ 3:27 PM

## 2024-10-01 NOTE — PROGRESS NOTES
[facility] - Psychotherapy Follow-up Visit Note  Visit number: 38    Visit Type Performed: In-office     Keri Hilton presented today for a behavioral health visit.      SUBJECTIVE     Symptoms  Depression symptoms: fatigue/lack of energy  Anxiety symptoms: mild anxiety/worry, irritability, and sleep disturbance  Additional reported symptoms: NA    OBJECTIVE     Mental Status Evaluation  Patient's mood and affect were consistent with the context, and consistent with their baseline: Yes   Comments: Calm and pleasant; awake and alert; oriented to person, place, and time    Suicidal Ideation/Homicidal Ideation Risk Assessment: not assessed. If not assessed, reason: Pt did not endorse any changes in risk or protective factors and none were observed.      Plan for Safety-   N/A:  Risk is assessed to be minimal; therefore, developing a safety plan is not indicated at this time.    Interventions  Acceptance and Adjustment, Cognitive Behavior Therapy, and Empathic Listening and Validation  We processed progress since the start of treatment including Patient's efforts to improve communication in her relationship, engage more in her grandchildren's lives, return to driving on her own, challenge anxious thoughts and cope with stressors outside of her control. We discussed readiness for discharge.     Psychotropic medications: no known adherence challenges, effective    Current Outpatient Medications   Medication Sig Dispense Refill    ARIPiprazole (ABILIFY) 2 mg tablet Take 1 tablet (2 mg total) by mouth daily. 90 tablet 0    aspirin 81 mg enteric coated tablet Take 81 mg by mouth daily.      atorvastatin (LIPITOR) 80 mg tablet Take 80 mg by mouth daily.       busPIRone (BUSPAR) 10 mg tablet Take 2 tablets (20 mg total) by mouth 3 (three) times a day. 540 tablet 0    diphenhydrAMINE (BENADRYL) 25 mg capsule Take 25 mg by mouth nightly as needed for sleep.      ezetimibe (ZETIA) 10 mg tablet Take 10 mg by  mouth daily.      FLUoxetine (PROzac) 20 mg capsule Take 1 capsule (20 mg total) by mouth every morning. 90 capsule 0    gabapentin (NEURONTIN) 600 mg tablet Take 1 tablet (600 mg total) by mouth every morning AND 1.5 tablets (900 mg total) nightly. 225 tablet 1    omeprazole (PriLOSEC) 40 mg capsule Take 1 capsule (40 mg total) by mouth daily before breakfast. 30 capsule 0     No current facility-administered medications for this visit.       ASSESSMENT       Progress  Patient's progress toward their goals is generally improving as Patient processed progress in treatment.   Patient's symptomology is stable, well controlled. Patient continues to deny nonepileptic seizure activity. Patient appears to be managing stressors of every day life well.     PLAN     Goals:  Development of skills for management of nonepileptic seizures    -Patient hopes to be able to return to driving     Recommendations  See discharge note      I spent  35 minutes on this date of service performing the following activities: providing counseling and education.

## 2024-10-09 LAB
ALBUMIN SERPL-MCNC: 4.3 G/DL (ref 3.8–4.8)
ALP SERPL-CCNC: 81 IU/L (ref 44–121)
ALT SERPL-CCNC: 27 IU/L (ref 0–32)
AST SERPL-CCNC: 39 IU/L (ref 0–40)
BILIRUB SERPL-MCNC: 0.8 MG/DL (ref 0–1.2)
BUN SERPL-MCNC: 13 MG/DL (ref 8–27)
BUN/CREAT SERPL: 19 (ref 12–28)
CALCIUM SERPL-MCNC: 9.6 MG/DL (ref 8.7–10.3)
CHLORIDE SERPL-SCNC: 102 MMOL/L (ref 96–106)
CHOLEST SERPL-MCNC: 119 MG/DL (ref 100–199)
CO2 SERPL-SCNC: 25 MMOL/L (ref 20–29)
CREAT SERPL-MCNC: 0.68 MG/DL (ref 0.57–1)
EGFRCR SERPLBLD CKD-EPI 2021: 92 ML/MIN/1.73
GLOBULIN SER CALC-MCNC: 1.7 G/DL (ref 1.5–4.5)
GLUCOSE SERPL-MCNC: 104 MG/DL (ref 70–99)
HBA1C MFR BLD: 5.6 % (ref 4.8–5.6)
HDLC SERPL-MCNC: 51 MG/DL
LDLC SERPL CALC-MCNC: 46 MG/DL (ref 0–99)
POTASSIUM SERPL-SCNC: 4 MMOL/L (ref 3.5–5.2)
PROT SERPL-MCNC: 6 G/DL (ref 6–8.5)
SODIUM SERPL-SCNC: 140 MMOL/L (ref 134–144)
TRIGL SERPL-MCNC: 125 MG/DL (ref 0–149)
VLDLC SERPL CALC-MCNC: 22 MG/DL (ref 5–40)

## 2024-10-09 NOTE — RESULT ENCOUNTER NOTE
Hello,    Your lab work looks good. Your cholesterol values have improved since last check--continue with the atorvastatin and zetia.  Your sugars have also improved since last check.  Kidney and liver function are normal.  Keep up the good work.  Please let me know if you have any questions.  Have a good day.    Richard Bell MD

## 2024-10-10 RX ORDER — OMEPRAZOLE 40 MG/1
40 CAPSULE, DELAYED RELEASE ORAL
Qty: 90 CAPSULE | Refills: 0 | Status: SHIPPED | OUTPATIENT
Start: 2024-10-10 | End: 2025-01-06

## 2024-10-10 NOTE — TELEPHONE ENCOUNTER
Medicine Refill Request    Last Office Visit: 9/16/2024   Last Consult Visit: 7/29/2024  Last Telemedicine Visit: Visit date not found    Next Appointment: 10/28/2024      Current Outpatient Medications:     ARIPiprazole (ABILIFY) 2 mg tablet, Take 1 tablet (2 mg total) by mouth daily., Disp: 90 tablet, Rfl: 0    aspirin 81 mg enteric coated tablet, Take 81 mg by mouth daily., Disp: , Rfl:     atorvastatin (LIPITOR) 80 mg tablet, Take 80 mg by mouth daily. , Disp: , Rfl:     busPIRone (BUSPAR) 10 mg tablet, Take 2 tablets (20 mg total) by mouth 3 (three) times a day., Disp: 540 tablet, Rfl: 0    diphenhydrAMINE (BENADRYL) 25 mg capsule, Take 25 mg by mouth nightly as needed for sleep., Disp: , Rfl:     ezetimibe (ZETIA) 10 mg tablet, Take 10 mg by mouth daily., Disp: , Rfl:     FLUoxetine (PROzac) 20 mg capsule, Take 1 capsule (20 mg total) by mouth every morning., Disp: 90 capsule, Rfl: 0    gabapentin (NEURONTIN) 600 mg tablet, Take 1 tablet (600 mg total) by mouth every morning AND 1.5 tablets (900 mg total) nightly., Disp: 225 tablet, Rfl: 1    omeprazole (PriLOSEC) 40 mg capsule, Take 1 capsule (40 mg total) by mouth daily before breakfast., Disp: 30 capsule, Rfl: 0      BP Readings from Last 3 Encounters:   09/16/24 124/68   07/29/24 110/72   03/15/24 112/66       Recent Lab results:  Lab Results   Component Value Date    CHOL 119 10/08/2024   ,   Lab Results   Component Value Date    HDL 51 10/08/2024   ,   Lab Results   Component Value Date    LDLCALC 46 10/08/2024   ,   Lab Results   Component Value Date    TRIG 125 10/08/2024        Lab Results   Component Value Date    GLUCOSE 104 (H) 10/08/2024   ,   Lab Results   Component Value Date    HGBA1C 5.6 10/08/2024         Lab Results   Component Value Date    CREATININE 0.68 10/08/2024       Lab Results   Component Value Date    TSH 2.050 08/18/2022           Lab Results   Component Value Date    HGBA1C 5.6 10/08/2024

## 2024-10-17 NOTE — TELEPHONE ENCOUNTER
Medicine Refill Request    Last Telemedicine Visit: 7/15/2024 Patricia Vinson MD    Next Appointment: 10/28/2024 Patricia Vinson MD      Current Outpatient Medications:     ARIPiprazole (ABILIFY) 2 mg tablet, Take 1 tablet (2 mg total) by mouth daily., Disp: 90 tablet, Rfl: 0    aspirin 81 mg enteric coated tablet, Take 81 mg by mouth daily., Disp: , Rfl:     atorvastatin (LIPITOR) 80 mg tablet, Take 80 mg by mouth daily. , Disp: , Rfl:     busPIRone (BUSPAR) 10 mg tablet, Take 2 tablets (20 mg total) by mouth 3 (three) times a day., Disp: 540 tablet, Rfl: 0    diphenhydrAMINE (BENADRYL) 25 mg capsule, Take 25 mg by mouth nightly as needed for sleep., Disp: , Rfl:     ezetimibe (ZETIA) 10 mg tablet, Take 10 mg by mouth daily., Disp: , Rfl:     FLUoxetine (PROzac) 20 mg capsule, Take 1 capsule (20 mg total) by mouth every morning., Disp: 90 capsule, Rfl: 0    gabapentin (NEURONTIN) 600 mg tablet, Take 1 tablet (600 mg total) by mouth every morning AND 1.5 tablets (900 mg total) nightly., Disp: 225 tablet, Rfl: 1    omeprazole (PriLOSEC) 40 mg capsule, Take 1 capsule (40 mg total) by mouth daily before breakfast., Disp: 90 capsule, Rfl: 0      BP Readings from Last 3 Encounters:   09/16/24 124/68   07/29/24 110/72   03/15/24 112/66       Recent Lab results:  Lab Results   Component Value Date    CHOL 119 10/08/2024   ,   Lab Results   Component Value Date    HDL 51 10/08/2024   ,   Lab Results   Component Value Date    LDLCALC 46 10/08/2024   ,   Lab Results   Component Value Date    TRIG 125 10/08/2024        Lab Results   Component Value Date    GLUCOSE 104 (H) 10/08/2024   ,   Lab Results   Component Value Date    HGBA1C 5.6 10/08/2024         Lab Results   Component Value Date    CREATININE 0.68 10/08/2024       Lab Results   Component Value Date    TSH 2.050 08/18/2022           Lab Results   Component Value Date    HGBA1C 5.6 10/08/2024     Total Time: 4

## 2024-10-18 RX ORDER — FLUOXETINE HYDROCHLORIDE 20 MG/1
20 CAPSULE ORAL EVERY MORNING
Qty: 90 CAPSULE | Refills: 0 | Status: SHIPPED | OUTPATIENT
Start: 2024-10-18 | End: 2025-01-07 | Stop reason: SDUPTHER

## 2024-10-18 RX ORDER — BUSPIRONE HYDROCHLORIDE 10 MG/1
20 TABLET ORAL 3 TIMES DAILY
Qty: 540 TABLET | Refills: 0 | Status: SHIPPED | OUTPATIENT
Start: 2024-10-18 | End: 2025-01-07 | Stop reason: SDUPTHER

## 2024-10-18 NOTE — TELEPHONE ENCOUNTER
Medicine Refill Request    Last Telemedicine Visit: 7/15/2024 Patricia Vinson MD    Next Appointment: 10/28/2024 Patricia Vinson MD      Current Outpatient Medications:     ARIPiprazole (ABILIFY) 2 mg tablet, Take 1 tablet (2 mg total) by mouth daily., Disp: 90 tablet, Rfl: 0    aspirin 81 mg enteric coated tablet, Take 81 mg by mouth daily., Disp: , Rfl:     atorvastatin (LIPITOR) 80 mg tablet, Take 80 mg by mouth daily. , Disp: , Rfl:     busPIRone (BUSPAR) 10 mg tablet, Take 2 tablets (20 mg total) by mouth 3 (three) times a day., Disp: 540 tablet, Rfl: 0    diphenhydrAMINE (BENADRYL) 25 mg capsule, Take 25 mg by mouth nightly as needed for sleep., Disp: , Rfl:     ezetimibe (ZETIA) 10 mg tablet, Take 10 mg by mouth daily., Disp: , Rfl:     FLUoxetine (PROzac) 20 mg capsule, Take 1 capsule (20 mg total) by mouth every morning., Disp: 90 capsule, Rfl: 0    gabapentin (NEURONTIN) 600 mg tablet, Take 1 tablet (600 mg total) by mouth every morning AND 1.5 tablets (900 mg total) nightly., Disp: 225 tablet, Rfl: 1    omeprazole (PriLOSEC) 40 mg capsule, Take 1 capsule (40 mg total) by mouth daily before breakfast., Disp: 90 capsule, Rfl: 0      BP Readings from Last 3 Encounters:   09/16/24 124/68   07/29/24 110/72   03/15/24 112/66       Recent Lab results:  Lab Results   Component Value Date    CHOL 119 10/08/2024   ,   Lab Results   Component Value Date    HDL 51 10/08/2024   ,   Lab Results   Component Value Date    LDLCALC 46 10/08/2024   ,   Lab Results   Component Value Date    TRIG 125 10/08/2024        Lab Results   Component Value Date    GLUCOSE 104 (H) 10/08/2024   ,   Lab Results   Component Value Date    HGBA1C 5.6 10/08/2024         Lab Results   Component Value Date    CREATININE 0.68 10/08/2024       Lab Results   Component Value Date    TSH 2.050 08/18/2022           Lab Results   Component Value Date    HGBA1C 5.6 10/08/2024     Total Time: 6

## 2024-10-24 ENCOUNTER — TELEPHONE (OUTPATIENT)
Dept: PULMONOLOGY | Facility: HOSPITAL | Age: 74
End: 2024-10-24
Payer: COMMERCIAL

## 2024-10-24 VITALS — WEIGHT: 158 LBS | HEIGHT: 65 IN | BODY MASS INDEX: 26.33 KG/M2

## 2024-10-28 ENCOUNTER — TELEMEDICINE (OUTPATIENT)
Dept: PSYCHIATRY | Facility: HOSPITAL | Age: 74
End: 2024-10-28
Attending: PSYCHIATRY & NEUROLOGY
Payer: COMMERCIAL

## 2024-10-28 ENCOUNTER — OFFICE VISIT (OUTPATIENT)
Dept: FAMILY MEDICINE | Facility: CLINIC | Age: 74
End: 2024-10-28
Payer: COMMERCIAL

## 2024-10-28 VITALS
RESPIRATION RATE: 16 BRPM | TEMPERATURE: 97.3 F | HEIGHT: 65 IN | SYSTOLIC BLOOD PRESSURE: 112 MMHG | OXYGEN SATURATION: 99 % | DIASTOLIC BLOOD PRESSURE: 68 MMHG | HEART RATE: 59 BPM | BODY MASS INDEX: 26.49 KG/M2 | WEIGHT: 159 LBS

## 2024-10-28 DIAGNOSIS — E78.5 HYPERLIPIDEMIA, UNSPECIFIED HYPERLIPIDEMIA TYPE: ICD-10-CM

## 2024-10-28 DIAGNOSIS — F32.A DEPRESSIVE DISORDER: ICD-10-CM

## 2024-10-28 DIAGNOSIS — F41.1 GENERALIZED ANXIETY DISORDER: ICD-10-CM

## 2024-10-28 DIAGNOSIS — F44.5 PSYCHOGENIC NONEPILEPTIC SEIZURE: ICD-10-CM

## 2024-10-28 DIAGNOSIS — Z00.00 MEDICARE ANNUAL WELLNESS VISIT, SUBSEQUENT: Primary | ICD-10-CM

## 2024-10-28 PROBLEM — I10 HYPERTENSION: Status: RESOLVED | Noted: 2019-04-29 | Resolved: 2024-10-28

## 2024-10-28 PROBLEM — R13.10 DYSPHAGIA: Status: RESOLVED | Noted: 2024-09-16 | Resolved: 2024-10-28

## 2024-10-28 PROCEDURE — G0439 PPPS, SUBSEQ VISIT: HCPCS | Performed by: FAMILY MEDICINE

## 2024-10-28 PROCEDURE — 99214 OFFICE O/P EST MOD 30 MIN: CPT | Mod: 95 | Performed by: PSYCHIATRY & NEUROLOGY

## 2024-10-28 RX ORDER — ARIPIPRAZOLE 2 MG/1
2 TABLET ORAL DAILY
Qty: 90 TABLET | Refills: 0 | Status: SHIPPED | OUTPATIENT
Start: 2024-10-28 | End: 2025-01-07 | Stop reason: SDUPTHER

## 2024-10-28 ASSESSMENT — MINI COG: TOTAL SCORE: 5

## 2024-10-28 ASSESSMENT — ENCOUNTER SYMPTOMS
FEVER: 0
SHORTNESS OF BREATH: 0
NUMBNESS: 0
FREQUENCY: 0
DIZZINESS: 0
WEAKNESS: 0
COUGH: 0
WHEEZING: 0
PALPITATIONS: 0
CHILLS: 0
HEADACHES: 0
DYSURIA: 0
BACK PAIN: 0

## 2024-10-28 ASSESSMENT — PATIENT HEALTH QUESTIONNAIRE - PHQ9: SUM OF ALL RESPONSES TO PHQ9 QUESTIONS 1 & 2: 0

## 2024-10-28 NOTE — PROGRESS NOTES
"Subjective      Patient ID: Keri Hilton is a 74 y.o. female.  1950      HPI    Here today for a medicare wellness visit.  Dysphagia improved with the omeprazole. Reviewed fluoroscopy results with patient--not interested in seeing GI at this time, will monitor symptoms and follow-up with them if any further swallowing issues occur.    The following have been reviewed and updated as appropriate in this visit:   Tobacco  Allergies  Meds  Problems  Med Hx  Surg Hx  Fam Hx  Soc   Hx      Review of Systems   Constitutional:  Negative for chills and fever.   Eyes:  Negative for visual disturbance.   Respiratory:  Negative for cough, shortness of breath and wheezing.    Cardiovascular:  Negative for chest pain, palpitations and leg swelling.   Genitourinary:  Negative for dysuria, frequency and urgency.   Musculoskeletal:  Negative for back pain.   Skin:  Negative for rash.   Neurological:  Negative for dizziness, weakness, numbness and headaches.       Objective     Vitals:    10/28/24 1115   BP: 112/68   BP Location: Left upper arm   Patient Position: Sitting   Pulse: (!) 59   Resp: 16   Temp: 36.3 °C (97.3 °F)   TempSrc: Temporal   SpO2: 99%   Weight: 72.1 kg (159 lb)   Height: 1.651 m (5' 5\")     Body mass index is 26.46 kg/m².    Physical Exam  Vitals and nursing note reviewed.   Constitutional:       General: She is not in acute distress.     Appearance: Normal appearance. She is well-developed. She is not ill-appearing.   Cardiovascular:      Rate and Rhythm: Normal rate and regular rhythm.      Heart sounds: Normal heart sounds. No murmur heard.     No friction rub.   Pulmonary:      Effort: Pulmonary effort is normal. No respiratory distress.      Breath sounds: Normal breath sounds. No stridor. No wheezing or rhonchi.   Musculoskeletal:         General: Normal range of motion.   Neurological:      Mental Status: She is alert.         Assessment & Plan  Medicare annual wellness visit, subsequent  UTD " with mammogram and DEXA.  UTD with colonoscopy.  UTD with flu vaccine.  Thinks she had her shingrix--will check with the pharmacy.  Will get prevnar at the pharmacy.       Hyperlipidemia, unspecified hyperlipidemia type  Stable, following with cardiology.  Continue medication.         Psychogenic nonepileptic seizure  Stable, following with psychiatry.  Continue medications.               Subjective     Keri Hilton is a 74 y.o. female who presents for a subsequent annual wellness visit.     Patient Care Team:  Richard Castro MD as PCP - General (Family Medicine)  Nithya Mac, RN as Registered Nurse  Shahzad Garcia MD as Referring Physician (Cardiology)  Felecia Garza, EVER as Respiratory Therapist (Pulmonary)  Patricia Vinson MD as Consulting Physician (Psychiatry)    Comprehensive Medical and Social History  Patient Active Problem List   Diagnosis    Hyperlipidemia    Balance disorder    Neuropathy    Medicare annual wellness visit, subsequent    Lumbar back pain    Coronary artery disease    Carpal tunnel syndrome on both sides    Psychogenic nonepileptic seizure     Past Medical History:   Diagnosis Date    Anxiety     Bipolar disorder (CMS/[facility])     Depression     Hypertension     MI (myocardial infarction) (CMS/[facility])      Past Surgical History   Procedure Laterality Date    Ankle surgery Left     s/p trauma    Carpal tunnel release Left      section      Coronary angioplasty with stent placement       Allergies   Allergen Reactions    Shellfish Containing Products Nausea And Vomiting    Shellfish Derived      Current Outpatient Medications   Medication Sig Dispense Refill    ARIPiprazole (ABILIFY) 2 mg tablet Take 1 tablet (2 mg total) by mouth daily. 90 tablet 0    aspirin 81 mg enteric coated tablet Take 81 mg by mouth daily.      atorvastatin (LIPITOR) 80 mg tablet Take 80 mg by mouth daily.       busPIRone (BUSPAR) 10 mg tablet Take 2 tablets (20 mg total) by mouth 3 (three)  times a day. 540 tablet 0    diphenhydrAMINE (BENADRYL) 25 mg capsule Take 25 mg by mouth nightly as needed for sleep.      ezetimibe (ZETIA) 10 mg tablet Take 10 mg by mouth daily.      FLUoxetine (PROzac) 20 mg capsule Take 1 capsule (20 mg total) by mouth every morning. 90 capsule 0    gabapentin (NEURONTIN) 600 mg tablet Take 1 tablet (600 mg total) by mouth every morning AND 1.5 tablets (900 mg total) nightly. 225 tablet 1    omeprazole (PriLOSEC) 40 mg capsule Take 1 capsule (40 mg total) by mouth daily before breakfast. 90 capsule 0     No current facility-administered medications for this visit.     Social History     Tobacco Use    Smoking status: Former     Current packs/day: 0.00     Average packs/day: 1 pack/day for 44.0 years (44.0 ttl pk-yrs)     Types: Cigarettes     Start date:      Quit date:      Years since quittin.8     Passive exposure: Past    Smokeless tobacco: Never   Vaping Use    Vaping status: Former    Quit date: 2024    Substances: Nicotine, Flavoring    Devices: Disposable, Pre-filled or refillable cartridge, Refillable tank, Pre-filled pod   Substance Use Topics    Alcohol use: Yes     Comment: special occasions only    Drug use: Never     Family History   Problem Relation Name Age of Onset    Cervical cancer Biological Mother Jenny     Heart disease Biological Father Vitor     Stroke Biological Father Vitor     Prostate cancer Biological Father Vitor     No Known Problems Biological Sister Marta     Prostate cancer Biological Brother Pierre     Colon cancer Maternal Grandmother Selene     Heart disease Maternal Grandfather Marino     Diabetes Paternal Grandmother Juju     Lung cancer Paternal Grandfather      Breast cancer Cousin Janet     Colon cancer Other         Objective   Vitals  Vitals:    10/28/24 1115   BP: 112/68   BP Location: Left upper arm   Patient Position: Sitting   Pulse: (!) 59   Resp: 16   Temp: 36.3 °C (97.3 °F)   TempSrc: Temporal  "  SpO2: 99%   Weight: 72.1 kg (159 lb)   Height: 1.651 m (5' 5\")     Body mass index is 26.46 kg/m².    Advanced Care Plan                                        PHQ  Will the patient answer the depression questions?: Yes   Little interest or pleasure in doing things: Not at all   Feeling down, depressed, or hopeless: Not at all   Depression Risk: 0                                             Mini Cog  Score: 5  Result: Negative      Get Up and Go  Result: Pass    STEADI Falls Risk                                                                Hearing and Vision Screening  No results found.  See HRA for relevant hearing screening response.    Diet and Exercise            Assessment/Plan   Diagnoses and all orders for this visit:    Medicare annual wellness visit, subsequent (Primary)  Assessment & Plan:  UTD with mammogram and DEXA.  UTD with colonoscopy.  UTD with flu vaccine.  Thinks she had her shingrix--will check with the pharmacy.  Will get prevnar at the pharmacy.           Hyperlipidemia, unspecified hyperlipidemia type  Assessment & Plan:  Stable, following with cardiology.  Continue medication.             Psychogenic nonepileptic seizure  Assessment & Plan:  Stable, following with psychiatry.  Continue medications.                 See Patient Instructions (the written plan) which was given to the patient for PPPS and health risk factors with interventions.   "
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
## 2024-10-28 NOTE — ASSESSMENT & PLAN NOTE
UTD with mammogram and DEXA.  UTD with colonoscopy.  UTD with flu vaccine.  Thinks she had her shingrix--will check with the pharmacy.  Will get prevnar at the pharmacy.

## 2024-10-28 NOTE — PATIENT INSTRUCTIONS
Your Personalized Prevention Plan Services (PPPS)    Preventive Services Checklist (Assumes Average Risk Unless Otherwise Noted):    Health Maintenance Topics with due status: Overdue       Topic Date Due    Depression Screening Never done    Hepatitis C Screening Never done    Lung Cancer Screening Never done    RSV Vaccine Never done    Pneumococcal (65 years and older) 03/05/2020    DTaP, Tdap, and Td Vaccines 12/08/2021    Zoster Vaccine 11/25/2023    Influenza Vaccine 08/01/2024    COVID-19 Vaccine 09/01/2024    Medicare Annual Wellness Visit 09/28/2024     Health Maintenance Topics with due status: Not Due       Topic Last Completion Date    DEXA Scan 01/12/2024    Breast Cancer Screening 03/25/2024    Falls Risk Screening 10/27/2024     Health Maintenance Topics with due status: Aged Out       Topic Date Due    Meningococcal ACWY Aged Out    RSV <20 months Aged Out    HIB Vaccines Aged Out    Hepatitis B Vaccines Aged Out    IPV Vaccines Aged Out    HPV Vaccines Aged Out     Health Maintenance Topics with due status: Discontinued       Topic Date Due    Colorectal Cancer Screening Discontinued       You May Be Eligible for These Additional Preventive Services   (Assumes Average Risk Unless Otherwise Noted)  Diabetes Screening Any 1 risk factor: hypertension, dyslipidemia, obesity, high glucose; or Any 2 risk factors: >=66yo, overweight, family history diabetes (covered every 6 months)   Hepatitis C Screening Any 1 risk factor: 1) blood transfusion before 1992,   2) current or past injection drug use (annually for high risk; if born between 7757-9208, see above for status).   Vaccine: Hepatitis B As necessary if at-risk: hemophilia, ESRD, diabetes, living with individual infected with hep B, healthcare worker with frequent contact with blood/bodily fluids (series covered once)   Sexually Transmitted Diseases (STDs) As necessary chlamydia, gonorrhea, syphilis, hepatitis B (covered  annually)  HIV if any 1 risk factor present: 1) <14yo or >64yo and at increased risk or 2) 15-64yo and ask for it (covered annually)   Lung Cancer Screening Low dose chest CT if all three risk factors: 1) 50-78yo, 2) smoker or quit within last 15y, 3) >=20 pack years (covered annually).  No results found for this or any previous visit.       Cholesterol Screening Both risk factors: 1) >=21yo and 2)  increased risk coronary artery disease (covered every 5 years).   Breast Cancer Screening Covered once 35-38yo, annually >=41yo (if >=49yo, see above for status).       Health Risk Factors with Personalized Education:  ----------------------------------------------------------------------------------------------------------------------  Stress management:  Understanding Your Stress  Think about how you know when you’re stressed.  Think about how your thoughts or behaviors are different when you’re stressed.  Think about what triggers stress for you.  Think about how you handle stress, and whether you’re making unhealthy choices in response to stress (like smoking, drinking alcohol or overeating).  Managing Stress  Take a break from the stressful situation.  Reduce your stress levels by exercising, talking with family/friends, ensuring adequate sleep.  Consider meditation or yoga.  Make sure you plan time to do things you enjoy.  Let your PCP know if you’re having problems limiting your stress.  ----------------------------------------------------------------------------------------------------------------------  Controlling Your Blood Pressure  Maintain a normal weight (body mass index between 18.5 and 24.9).  Eat more fruit, vegetables and low-fat dairy.  Eat less saturated fat and total fat.  Lower your sodium (salt) intake.  Try to stay under 1500 mg per day, but if you cannot get your intake to be that low, at least lower it by 1000 mg.  Stay active.  Try to get at least 90 to 150 minutes of exercise per week.  Try  brisk walking, swimming, bicycling or dancing.  Limit alcohol intake.  When you do consume alcohol, drink no more than 1 drink per day.  If you have been prescribed medication, take it regularly and exactly as prescribed.  Let your PCP know if you have any problems or questions about your medication.  Check your blood pressure at home or at the store.  Write down your readings and share them with your PCP  ----------------------------------------------------------------------------------------------------------------------  Controlling Your Cholesterol  Reduce the amount of saturated and trans fat in your diet.  Limit intake of red meat.  Consume only low-fat or non-fat/skim dairy.  Limit fried food.  Cook with vegetable oils.  Reduce your intake of sugary foods, sugary drinks and alcohol.  Eat a diet high in fruit, vegetables and whole grains.  Get protein from fish, poultry and a small portion of nuts.  Stay active.  Try to get at least 90 to 150 minutes of exercise per week.  Try brisk walking, swimming, bicycling or dancing.  Maintain a healthy weight by balancing your diet and exercise.  If you have been prescribed medication, take it regularly and exactly as prescribed. Let your PCP know if you have any problems or questions about your medication.  It’s important to know your cholesterol numbers.  When recommended by your PCP, get the cholesterol blood test.  ----------------------------------------------------------------------------------------------------------------------  Maintaining Strong Bones  Try to get at least 90 to 150 minutes of weight-bearing exercise per week.  Ensure intake of at least 1200mg of calcium per day.  Eat foods high in calcium like milk and other dairy, green vegetables, fruit, canned fish with soft and edible bones, nuts, calcium-set tofu.  Some foods are calcium-fortified, like bread, cereal, fruit juices and mineral water.  Help your body make vitamin D by getting 10-15 minutes  per day of sunlight.    Ensure intake of at least 600IU of vitamin D per day.  Eat foods high in vitamin D like oily fish (salmon, sardines, mackerel) and eggs.  Some foods are fortified with vitamin D, like dairy and cereals.  Avoid high amounts of caffeine and salt, since they can cause the body to loose calcium.  Limit alcohol intake, since it is associated with weaker bones and is associated with falls and fractures.  Limit intake of fizzy drinks.  ----------------------------------------------------------------------------------------------------------------------  Reducing Your Risk of Falls  Tell your PCP if any of your medications make you feel tired, dizzy, lightheaded or off-balance.  Maintain coordination, flexibility and balance by ensuring regular physical activity.  Limit alcohol intake to 1 drink per day.  Consider avoiding all alcohol intake.  Ensure good vision.  Visit an ophthalmologist or optometrist regularly for vision screening or to make sure your glasses / contact lens prescription is correct.  If you need glasses or contacts, wear them.  When you get new glasses or contacts, take time to get used to them.  Do not wear sunglasses or tinted lenses when indoors.  Ensure good hearing.  Have your hearing checked if you are having trouble hearing, or family and friends think you cannot hear them.  If you need a hearing aid, be sure it fits well and wear it.  Get enough rest.  Ensure about 7-9 hours of sleep every day.  Get up slowly from your bed or chairs.  Do not start walking until you are sure you feel steady.  Wear non-skid, rubber-soled, low-heeled shoes.  Do not walk in socks, or in shoes and slippers with smooth soles.  If your PCP or therapist recommends using a cane or walker, use it regularly.  Make your home safer.  Increase lighting throughout the house, especially at the top and bottom of stairs.  Ensure lighting is easily turned on when getting up in the middle of the night.  Make  sure there are two secure rails on all stairs.  Install grab bars in the bathtub / shower and near the toilet.  Consider using a shower chair and / or a hand-held shower.  Spread sand or salt on icy surfaces.  Beware of wet surfaces, which can be icy.  Tell your PCP if you have fallen.

## 2025-01-06 RX ORDER — OMEPRAZOLE 40 MG/1
40 CAPSULE, DELAYED RELEASE ORAL
Qty: 90 CAPSULE | Refills: 3 | Status: SHIPPED | OUTPATIENT
Start: 2025-01-06 | End: 2025-03-23 | Stop reason: SDUPTHER

## 2025-01-06 NOTE — TELEPHONE ENCOUNTER
Medicine Refill Request    Last Office Visit: 10/28/2024   Last Consult Visit: 7/29/2024  Last Telemedicine Visit: Visit date not found    Next Appointment: Visit date not found      Current Outpatient Medications:     ARIPiprazole (ABILIFY) 2 mg tablet, Take 1 tablet (2 mg total) by mouth daily., Disp: 90 tablet, Rfl: 0    aspirin 81 mg enteric coated tablet, Take 81 mg by mouth daily., Disp: , Rfl:     atorvastatin (LIPITOR) 80 mg tablet, Take 80 mg by mouth daily. , Disp: , Rfl:     busPIRone (BUSPAR) 10 mg tablet, Take 2 tablets (20 mg total) by mouth 3 (three) times a day., Disp: 540 tablet, Rfl: 0    diphenhydrAMINE (BENADRYL) 25 mg capsule, Take 25 mg by mouth nightly as needed for sleep., Disp: , Rfl:     ezetimibe (ZETIA) 10 mg tablet, Take 10 mg by mouth daily., Disp: , Rfl:     FLUoxetine (PROzac) 20 mg capsule, Take 1 capsule (20 mg total) by mouth every morning., Disp: 90 capsule, Rfl: 0    gabapentin (NEURONTIN) 600 mg tablet, Take 1 tablet (600 mg total) by mouth every morning AND 1.5 tablets (900 mg total) nightly., Disp: 225 tablet, Rfl: 1    omeprazole (PriLOSEC) 40 mg capsule, Take 1 capsule (40 mg total) by mouth daily before breakfast., Disp: 90 capsule, Rfl: 0      BP Readings from Last 3 Encounters:   10/28/24 112/68   09/16/24 124/68   07/29/24 110/72       Recent Lab results:  Lab Results   Component Value Date    CHOL 119 10/08/2024   ,   Lab Results   Component Value Date    HDL 51 10/08/2024   ,   Lab Results   Component Value Date    LDLCALC 46 10/08/2024   ,   Lab Results   Component Value Date    TRIG 125 10/08/2024        Lab Results   Component Value Date    GLUCOSE 104 (H) 10/08/2024   ,   Lab Results   Component Value Date    HGBA1C 5.6 10/08/2024         Lab Results   Component Value Date    CREATININE 0.68 10/08/2024       Lab Results   Component Value Date    TSH 2.050 08/18/2022           Lab Results   Component Value Date    HGBA1C 5.6 10/08/2024

## 2025-01-07 ENCOUNTER — TELEMEDICINE (OUTPATIENT)
Dept: PSYCHIATRY | Facility: HOSPITAL | Age: 75
End: 2025-01-07
Attending: PSYCHIATRY & NEUROLOGY
Payer: COMMERCIAL

## 2025-01-07 DIAGNOSIS — F41.1 GENERALIZED ANXIETY DISORDER: ICD-10-CM

## 2025-01-07 DIAGNOSIS — F32.A DEPRESSIVE DISORDER: ICD-10-CM

## 2025-01-07 DIAGNOSIS — F44.5 PSYCHOGENIC NONEPILEPTIC SEIZURE: ICD-10-CM

## 2025-01-07 PROCEDURE — 99214 OFFICE O/P EST MOD 30 MIN: CPT | Mod: 95 | Performed by: PSYCHIATRY & NEUROLOGY

## 2025-01-07 RX ORDER — ARIPIPRAZOLE 2 MG/1
2 TABLET ORAL DAILY
Qty: 90 TABLET | Refills: 0 | Status: SHIPPED | OUTPATIENT
Start: 2025-01-07 | End: 2025-04-07

## 2025-01-07 RX ORDER — BUSPIRONE HYDROCHLORIDE 10 MG/1
20 TABLET ORAL 3 TIMES DAILY
Qty: 540 TABLET | Refills: 0 | Status: SHIPPED | OUTPATIENT
Start: 2025-01-07 | End: 2025-04-07

## 2025-01-07 RX ORDER — FLUOXETINE HYDROCHLORIDE 20 MG/1
20 CAPSULE ORAL EVERY MORNING
Qty: 90 CAPSULE | Refills: 0 | Status: SHIPPED | OUTPATIENT
Start: 2025-01-07 | End: 2025-04-07

## 2025-01-07 ASSESSMENT — ABNORMAL INVOLUNTARY MOVEMENT SCALE (AIMS)
AIMS_SEVERITY: NONE, NORMAL
TOTAL_SCORE: 0
CURRENT_PROBLEMS_TEETH_DENTURES: NO
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
## 2025-01-07 NOTE — Clinical Note
"Keri is a complicated but very interesting patient.  She is articulate and motivated but with multiple areas of vulnerability including nonepileptiform seizures which she refers to as \"episodes.\"  She has been stable for almost 6 months.  Had seen TJ Crabtree prior to seeing me.  Moderate polypharmacy but would lean towards maintaining current regimen at least through summer 2025 before considering simplification.  Patient has also benefited from individual therapy through [facility], but improved and was discharged from their care."

## 2025-01-08 DIAGNOSIS — G62.9 NEUROPATHY: ICD-10-CM

## 2025-01-09 RX ORDER — GABAPENTIN 600 MG/1
TABLET ORAL
Qty: 225 TABLET | Refills: 1 | Status: SHIPPED | OUTPATIENT
Start: 2025-01-09

## 2025-01-09 NOTE — TELEPHONE ENCOUNTER
Medicine Refill Request    Last Office Visit: 10/28/2024   Last Consult Visit: 7/29/2024  Last Telemedicine Visit: Visit date not found    Next Appointment: Visit date not found      Current Outpatient Medications:     ARIPiprazole (ABILIFY) 2 mg tablet, Take 1 tablet (2 mg total) by mouth daily., Disp: 90 tablet, Rfl: 0    aspirin 81 mg enteric coated tablet, Take 81 mg by mouth daily., Disp: , Rfl:     atorvastatin (LIPITOR) 80 mg tablet, Take 80 mg by mouth daily. , Disp: , Rfl:     busPIRone (BUSPAR) 10 mg tablet, Take 2 tablets (20 mg total) by mouth 3 (three) times a day., Disp: 540 tablet, Rfl: 0    diphenhydrAMINE (BENADRYL) 25 mg capsule, Take 25 mg by mouth nightly as needed for sleep., Disp: , Rfl:     ezetimibe (ZETIA) 10 mg tablet, Take 10 mg by mouth daily., Disp: , Rfl:     FLUoxetine (PROzac) 20 mg capsule, Take 1 capsule (20 mg total) by mouth every morning., Disp: 90 capsule, Rfl: 0    gabapentin (NEURONTIN) 600 mg tablet, Take 1 tablet (600 mg total) by mouth every morning AND 1.5 tablets (900 mg total) nightly., Disp: 225 tablet, Rfl: 1    omeprazole (PriLOSEC) 40 mg capsule, TAKE 1 CAPSULE BY MOUTH EVERY DAY BEFORE BREAKFAST, Disp: 90 capsule, Rfl: 3      BP Readings from Last 3 Encounters:   10/28/24 112/68   09/16/24 124/68   07/29/24 110/72       Recent Lab results:  Lab Results   Component Value Date    CHOL 119 10/08/2024   ,   Lab Results   Component Value Date    HDL 51 10/08/2024   ,   Lab Results   Component Value Date    LDLCALC 46 10/08/2024   ,   Lab Results   Component Value Date    TRIG 125 10/08/2024        Lab Results   Component Value Date    GLUCOSE 104 (H) 10/08/2024   ,   Lab Results   Component Value Date    HGBA1C 5.6 10/08/2024         Lab Results   Component Value Date    CREATININE 0.68 10/08/2024       Lab Results   Component Value Date    TSH 2.050 08/18/2022           Lab Results   Component Value Date    HGBA1C 5.6 10/08/2024

## 2025-02-21 ENCOUNTER — TELEPHONE (OUTPATIENT)
Dept: PSYCHIATRY | Facility: HOSPITAL | Age: 75
End: 2025-02-21
Payer: COMMERCIAL

## 2025-02-21 NOTE — TELEPHONE ENCOUNTER
Please call [phone], Left a voicemail informing appointment with Dr. Cantor on 3/11 for In-person Diag Eval @[facility] was cancelled due to changes with providers schedule. Patient will need to call to reschedule.

## 2025-03-24 RX ORDER — OMEPRAZOLE 40 MG/1
40 CAPSULE, DELAYED RELEASE ORAL
Qty: 90 CAPSULE | Refills: 3 | Status: SHIPPED | OUTPATIENT
Start: 2025-03-24
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
## 2025-04-01 ENCOUNTER — OFFICE VISIT (OUTPATIENT)
Dept: PSYCHIATRY | Facility: HOSPITAL | Age: 75
End: 2025-04-01
Attending: STUDENT IN AN ORGANIZED HEALTH CARE EDUCATION/TRAINING PROGRAM
Payer: COMMERCIAL

## 2025-04-01 DIAGNOSIS — F41.1 GENERALIZED ANXIETY DISORDER: Primary | ICD-10-CM

## 2025-04-01 DIAGNOSIS — F44.5 PSYCHOGENIC NONEPILEPTIC SEIZURE: ICD-10-CM

## 2025-04-01 DIAGNOSIS — Z87.898 HISTORY OF ALCOHOL USE DISORDER: ICD-10-CM

## 2025-04-01 DIAGNOSIS — F32.A DEPRESSIVE DISORDER: ICD-10-CM

## 2025-04-01 RX ORDER — ARIPIPRAZOLE 2 MG/1
2 TABLET ORAL DAILY
Qty: 90 TABLET | Refills: 0 | Status: SHIPPED | OUTPATIENT
Start: 2025-04-01 | End: 2025-06-30

## 2025-04-01 RX ORDER — BUSPIRONE HYDROCHLORIDE 10 MG/1
20 TABLET ORAL 3 TIMES DAILY
Qty: 180 TABLET | Refills: 2 | Status: SHIPPED | OUTPATIENT
Start: 2025-04-01 | End: 2025-06-30

## 2025-04-01 RX ORDER — FLUOXETINE HYDROCHLORIDE 20 MG/1
20 CAPSULE ORAL EVERY MORNING
Qty: 90 CAPSULE | Refills: 0 | Status: SHIPPED | OUTPATIENT
Start: 2025-04-01 | End: 2025-06-30

## 2025-04-01 NOTE — LETTER
[facility] PSYCH ONLY TREATMENT PLAN 4/1/25   Effective from: 4/1/2025  Effective to: 9/28/2025    Plan ID: [id number]            Participants     You: Keri    Your team: Danay Cantor MD (Consulting Physician)       Problem: Medication Management for Generalized anxiety disorder  (primary encounter diagnosis) Depressive disorder Psychogenic nonepileptic seizure      Dates: Start:  04/01/25       Description: Follow up needed every 2-26 Weeks.                 Disciplines: Interdisciplinary    Goal: Maintain current level of function with medication adherance and compliance     Dates: Start:  04/01/25    Expected End:  10/01/25       Disciplines: Interdisciplinary    Intervention: Medications as prescribed with continue monitoring     Dates: Start:  04/01/25                      Patient Strengths & Barriers     None

## 2025-04-12 DIAGNOSIS — G62.9 NEUROPATHY: ICD-10-CM

## 2025-04-14 RX ORDER — GABAPENTIN 600 MG/1
TABLET ORAL
Qty: 225 TABLET | Refills: 1 | Status: SHIPPED | OUTPATIENT
Start: 2025-04-14

## 2025-05-20 LAB — HBA1C MFR BLD: 5.5 % (ref 4.8–5.6)

## 2025-05-21 LAB
CHOLEST SERPL-MCNC: 126 MG/DL (ref 100–199)
HDLC SERPL-MCNC: 61 MG/DL
LDLC SERPL CALC-MCNC: 46 MG/DL (ref 0–99)
TRIGL SERPL-MCNC: 103 MG/DL (ref 0–149)
VLDLC SERPL CALC-MCNC: 19 MG/DL (ref 5–40)

## 2025-05-22 LAB
CHOLEST SERPL-MCNC: 126 MG/DL (ref 100–199)
HBA1C MFR BLD: 5.5 % (ref 4.8–5.6)
HDLC SERPL-MCNC: 61 MG/DL
LDLC SERPL CALC-MCNC: 46 MG/DL (ref 0–99)
TRIGL SERPL-MCNC: 103 MG/DL (ref 0–149)
VLDLC SERPL CALC-MCNC: 19 MG/DL (ref 5–40)

## 2025-06-20 ENCOUNTER — OFFICE VISIT (OUTPATIENT)
Dept: FAMILY MEDICINE | Facility: CLINIC | Age: 75
End: 2025-06-20
Payer: COMMERCIAL

## 2025-06-20 VITALS
BODY MASS INDEX: 26.09 KG/M2 | RESPIRATION RATE: 16 BRPM | TEMPERATURE: 97.5 F | DIASTOLIC BLOOD PRESSURE: 58 MMHG | SYSTOLIC BLOOD PRESSURE: 110 MMHG | HEART RATE: 72 BPM | WEIGHT: 156.6 LBS | HEIGHT: 65 IN | OXYGEN SATURATION: 95 %

## 2025-06-20 DIAGNOSIS — J06.9 UPPER RESPIRATORY TRACT INFECTION, UNSPECIFIED TYPE: Primary | ICD-10-CM

## 2025-06-20 PROCEDURE — 99213 OFFICE O/P EST LOW 20 MIN: CPT | Performed by: FAMILY MEDICINE

## 2025-06-20 PROCEDURE — 1157F ADVNC CARE PLAN IN RCRD: CPT | Performed by: FAMILY MEDICINE

## 2025-06-20 PROCEDURE — 3008F BODY MASS INDEX DOCD: CPT | Performed by: FAMILY MEDICINE

## 2025-06-20 RX ORDER — AMOXICILLIN 875 MG/1
875 TABLET, FILM COATED ORAL 2 TIMES DAILY
Qty: 14 TABLET | Refills: 0 | Status: SHIPPED | OUTPATIENT
Start: 2025-06-20 | End: 2025-06-27

## 2025-06-20 ASSESSMENT — ENCOUNTER SYMPTOMS
SINUS PRESSURE: 0
SHORTNESS OF BREATH: 0
FATIGUE: 0
CHILLS: 0
FEVER: 0
SORE THROAT: 1
WHEEZING: 0
COUGH: 1

## 2025-06-20 NOTE — ASSESSMENT & PLAN NOTE
Will continue with the over the counter medications as needed.  Has an albuterol inhaler can use as needed.  Will use the amoxicillin if not improving or worsening over the next few days.  Call with any concerns.

## 2025-06-20 NOTE — PROGRESS NOTES
"Subjective      Patient ID: Keri Hilton is a 74 y.o. female.  1950      HPI    Here today for a sick visit. Started two days ago with a sore throat, then felt feverish--did not take temp. Last night was wheezing, coughing. Has been taking robitussin.    The following have been reviewed and updated as appropriate in this visit:   Allergies  Meds  Problems       Review of Systems   Constitutional:  Negative for chills, fatigue and fever.   HENT:  Positive for congestion and sore throat. Negative for ear discharge, ear pain, postnasal drip, sinus pressure and sneezing.    Respiratory:  Positive for cough. Negative for shortness of breath and wheezing.        Objective     Vitals:    06/20/25 1109   BP: (!) 110/58   BP Location: Left upper arm   Patient Position: Sitting   Pulse: 72   Resp: 16   Temp: 36.4 °C (97.5 °F)   TempSrc: Temporal   SpO2: 95%   Weight: 71 kg (156 lb 9.6 oz)   Height: 1.651 m (5' 5\")     Body mass index is 26.06 kg/m².    Physical Exam  Vitals and nursing note reviewed.   Constitutional:       General: She is not in acute distress.     Appearance: Normal appearance. She is well-developed. She is not diaphoretic.   HENT:      Head: Normocephalic and atraumatic.      Right Ear: Tympanic membrane, ear canal and external ear normal.      Left Ear: Tympanic membrane, ear canal and external ear normal.      Nose: Nose normal.      Mouth/Throat:      Pharynx: No oropharyngeal exudate.   Eyes:      General:         Right eye: No discharge.         Left eye: No discharge.      Conjunctiva/sclera: Conjunctivae normal.      Pupils: Pupils are equal, round, and reactive to light.   Cardiovascular:      Rate and Rhythm: Normal rate and regular rhythm.      Heart sounds: Normal heart sounds. No murmur heard.     No friction rub.   Pulmonary:      Effort: Pulmonary effort is normal. No respiratory distress.      Breath sounds: Normal breath sounds. No stridor. No wheezing, rhonchi or rales. "   Lymphadenopathy:      Cervical: No cervical adenopathy.   Neurological:      Mental Status: She is alert.         Assessment & Plan  Upper respiratory tract infection, unspecified type  Will continue with the over the counter medications as needed.  Has an albuterol inhaler can use as needed.  Will use the amoxicillin if not improving or worsening over the next few days.  Call with any concerns.

## 2025-06-30 RX ORDER — FLUOXETINE 20 MG/1
20 CAPSULE ORAL EVERY MORNING
Qty: 90 CAPSULE | Refills: 0 | OUTPATIENT
Start: 2025-06-30
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
## 2025-07-07 ENCOUNTER — TELEMEDICINE (OUTPATIENT)
Dept: PSYCHIATRY | Facility: HOSPITAL | Age: 75
End: 2025-07-07
Attending: STUDENT IN AN ORGANIZED HEALTH CARE EDUCATION/TRAINING PROGRAM
Payer: COMMERCIAL

## 2025-07-07 DIAGNOSIS — F44.5 PSYCHOGENIC NONEPILEPTIC SEIZURE: ICD-10-CM

## 2025-07-07 DIAGNOSIS — F32.A DEPRESSIVE DISORDER: ICD-10-CM

## 2025-07-07 DIAGNOSIS — F41.1 GENERALIZED ANXIETY DISORDER: Primary | ICD-10-CM

## 2025-07-07 RX ORDER — BUSPIRONE HYDROCHLORIDE 30 MG/1
30 TABLET ORAL 2 TIMES DAILY
Qty: 180 TABLET | Refills: 0 | Status: SHIPPED | OUTPATIENT
Start: 2025-07-07 | End: 2025-10-05

## 2025-07-07 RX ORDER — ARIPIPRAZOLE 2 MG/1
2 TABLET ORAL DAILY
Qty: 90 TABLET | Refills: 0 | Status: SHIPPED | OUTPATIENT
Start: 2025-07-07 | End: 2025-10-05

## 2025-07-07 RX ORDER — FLUOXETINE 20 MG/1
20 CAPSULE ORAL EVERY MORNING
Qty: 90 CAPSULE | Refills: 0 | Status: SHIPPED | OUTPATIENT
Start: 2025-07-07 | End: 2025-10-05

## 2025-07-11 DIAGNOSIS — G62.9 NEUROPATHY: ICD-10-CM

## 2025-07-11 RX ORDER — GABAPENTIN 600 MG/1
TABLET ORAL
Qty: 225 TABLET | Refills: 1 | Status: SHIPPED | OUTPATIENT
Start: 2025-07-11

## 2025-07-11 NOTE — TELEPHONE ENCOUNTER
Medicine Refill Request    Last Office Visit: 6/20/2025   Last Consult Visit: 7/29/2024  Last Telemedicine Visit: Visit date not found    Next Appointment: Visit date not found      Current Outpatient Medications:     ARIPiprazole (ABILIFY) 2 mg tablet, Take 1 tablet (2 mg total) by mouth daily., Disp: 90 tablet, Rfl: 0    aspirin 81 mg enteric coated tablet, Take 81 mg by mouth daily., Disp: , Rfl:     atorvastatin (LIPITOR) 80 mg tablet, Take 80 mg by mouth daily. , Disp: , Rfl:     busPIRone (BUSPAR) 30 mg tablet, Take 1 tablet (30 mg total) by mouth 2 (two) times a day., Disp: 180 tablet, Rfl: 0    diphenhydrAMINE (BENADRYL) 25 mg capsule, Take 25 mg by mouth nightly as needed for sleep., Disp: , Rfl:     ezetimibe (ZETIA) 10 mg tablet, Take 10 mg by mouth daily., Disp: , Rfl:     FLUoxetine (PROzac) 20 mg capsule, Take 1 capsule (20 mg total) by mouth every morning., Disp: 90 capsule, Rfl: 0    gabapentin (NEURONTIN) 600 mg tablet, Take 1 tablet (600 mg total) by mouth every morning AND 1.5 tablets (900 mg total) nightly., Disp: 225 tablet, Rfl: 1    omeprazole (PriLOSEC) 40 mg capsule, Take 1 capsule (40 mg total) by mouth daily before breakfast., Disp: 90 capsule, Rfl: 3    BP Readings from Last 3 Encounters:   06/20/25 (!) 110/58   10/28/24 112/68   09/16/24 124/68       Recent Lab results:  Lab Results   Component Value Date    CHOL 126 05/19/2025    CHOL 126 05/19/2025   ,   Lab Results   Component Value Date    HDL 61 05/19/2025    HDL 61 05/19/2025   ,   Lab Results   Component Value Date    LDLCALC 46 05/19/2025    LDLCALC 46 05/19/2025   ,   Lab Results   Component Value Date    TRIG 103 05/19/2025    TRIG 103 05/19/2025        Lab Results   Component Value Date    GLUCOSE 104 (H) 10/08/2024   ,   Lab Results   Component Value Date    HGBA1C 5.5 05/19/2025    HGBA1C 5.5 05/19/2025         Lab Results   Component Value Date    CREATININE 0.68 10/08/2024       Lab Results   Component Value Date    TSH  2.050 08/18/2022           Lab Results   Component Value Date    HGBA1C 5.5 05/19/2025    HGBA1C 5.5 05/19/2025